# Patient Record
Sex: FEMALE | Race: ASIAN | NOT HISPANIC OR LATINO | URBAN - METROPOLITAN AREA
[De-identification: names, ages, dates, MRNs, and addresses within clinical notes are randomized per-mention and may not be internally consistent; named-entity substitution may affect disease eponyms.]

---

## 2017-09-26 ENCOUNTER — OUTPATIENT (OUTPATIENT)
Dept: OUTPATIENT SERVICES | Facility: HOSPITAL | Age: 82
LOS: 1 days | End: 2017-09-26
Payer: MEDICARE

## 2017-09-26 DIAGNOSIS — I77.0 ARTERIOVENOUS FISTULA, ACQUIRED: Chronic | ICD-10-CM

## 2017-09-26 DIAGNOSIS — I10 ESSENTIAL (PRIMARY) HYPERTENSION: ICD-10-CM

## 2017-09-26 DIAGNOSIS — I42.9 CARDIOMYOPATHY, UNSPECIFIED: ICD-10-CM

## 2017-09-26 PROCEDURE — 93306 TTE W/DOPPLER COMPLETE: CPT

## 2017-09-26 PROCEDURE — 93306 TTE W/DOPPLER COMPLETE: CPT | Mod: 26

## 2019-01-08 ENCOUNTER — INPATIENT (INPATIENT)
Facility: HOSPITAL | Age: 84
LOS: 1 days | Discharge: HOME | DRG: 682 | End: 2019-01-10
Attending: INTERNAL MEDICINE | Admitting: INTERNAL MEDICINE
Payer: MEDICARE

## 2019-01-08 VITALS
TEMPERATURE: 97 F | WEIGHT: 177.03 LBS | OXYGEN SATURATION: 98 % | SYSTOLIC BLOOD PRESSURE: 149 MMHG | HEIGHT: 59 IN | RESPIRATION RATE: 24 BRPM | DIASTOLIC BLOOD PRESSURE: 68 MMHG | HEART RATE: 47 BPM

## 2019-01-08 DIAGNOSIS — E87.5 HYPERKALEMIA: ICD-10-CM

## 2019-01-08 DIAGNOSIS — I10 ESSENTIAL (PRIMARY) HYPERTENSION: ICD-10-CM

## 2019-01-08 DIAGNOSIS — N18.6 END STAGE RENAL DISEASE: ICD-10-CM

## 2019-01-08 DIAGNOSIS — E78.5 HYPERLIPIDEMIA, UNSPECIFIED: ICD-10-CM

## 2019-01-08 DIAGNOSIS — N25.0 RENAL OSTEODYSTROPHY: ICD-10-CM

## 2019-01-08 DIAGNOSIS — Z29.9 ENCOUNTER FOR PROPHYLACTIC MEASURES, UNSPECIFIED: ICD-10-CM

## 2019-01-08 DIAGNOSIS — I77.0 ARTERIOVENOUS FISTULA, ACQUIRED: Chronic | ICD-10-CM

## 2019-01-08 DIAGNOSIS — R06.02 SHORTNESS OF BREATH: ICD-10-CM

## 2019-01-08 DIAGNOSIS — D64.9 ANEMIA, UNSPECIFIED: ICD-10-CM

## 2019-01-08 DIAGNOSIS — Z91.89 OTHER SPECIFIED PERSONAL RISK FACTORS, NOT ELSEWHERE CLASSIFIED: ICD-10-CM

## 2019-01-08 DIAGNOSIS — R63.8 OTHER SYMPTOMS AND SIGNS CONCERNING FOOD AND FLUID INTAKE: ICD-10-CM

## 2019-01-08 DIAGNOSIS — R00.1 BRADYCARDIA, UNSPECIFIED: ICD-10-CM

## 2019-01-08 DIAGNOSIS — R74.8 ABNORMAL LEVELS OF OTHER SERUM ENZYMES: ICD-10-CM

## 2019-01-08 LAB
ANION GAP SERPL CALC-SCNC: 23 MMOL/L — HIGH (ref 5–17)
BASOPHILS NFR BLD AUTO: 0.3 % — SIGNIFICANT CHANGE UP (ref 0–2)
BUN SERPL-MCNC: 92 MG/DL — HIGH (ref 7–23)
CALCIUM SERPL-MCNC: 9.8 MG/DL — SIGNIFICANT CHANGE UP (ref 8.4–10.5)
CHLORIDE SERPL-SCNC: 87 MMOL/L — LOW (ref 96–108)
CO2 SERPL-SCNC: 15 MMOL/L — LOW (ref 22–31)
CREAT SERPL-MCNC: 9.33 MG/DL — HIGH (ref 0.5–1.3)
EOSINOPHIL NFR BLD AUTO: 0.2 % — SIGNIFICANT CHANGE UP (ref 0–6)
GLUCOSE SERPL-MCNC: 153 MG/DL — HIGH (ref 70–99)
HBV SURFACE AG SER-ACNC: SIGNIFICANT CHANGE UP
HCT VFR BLD CALC: 34.3 % — LOW (ref 34.5–45)
HGB BLD-MCNC: 11.5 G/DL — SIGNIFICANT CHANGE UP (ref 11.5–15.5)
LYMPHOCYTES # BLD AUTO: 14.9 % — SIGNIFICANT CHANGE UP (ref 13–44)
MCHC RBC-ENTMCNC: 32 PG — SIGNIFICANT CHANGE UP (ref 27–34)
MCHC RBC-ENTMCNC: 33.5 G/DL — SIGNIFICANT CHANGE UP (ref 32–36)
MCV RBC AUTO: 95.5 FL — SIGNIFICANT CHANGE UP (ref 80–100)
MONOCYTES NFR BLD AUTO: 6 % — SIGNIFICANT CHANGE UP (ref 2–14)
NEUTROPHILS NFR BLD AUTO: 78.6 % — HIGH (ref 43–77)
PLATELET # BLD AUTO: 253 K/UL — SIGNIFICANT CHANGE UP (ref 150–400)
POTASSIUM SERPL-MCNC: 7.5 MMOL/L — CRITICAL HIGH (ref 3.5–5.3)
POTASSIUM SERPL-SCNC: 7.5 MMOL/L — CRITICAL HIGH (ref 3.5–5.3)
RAPID RVP RESULT: SIGNIFICANT CHANGE UP
RBC # BLD: 3.59 M/UL — LOW (ref 3.8–5.2)
RBC # FLD: 12.8 % — SIGNIFICANT CHANGE UP (ref 10.3–16.9)
SODIUM SERPL-SCNC: 125 MMOL/L — LOW (ref 135–145)
TROPONIN T SERPL-MCNC: 0.14 NG/ML — CRITICAL HIGH (ref 0–0.01)
WBC # BLD: 11.6 K/UL — HIGH (ref 3.8–10.5)
WBC # FLD AUTO: 11.6 K/UL — HIGH (ref 3.8–10.5)

## 2019-01-08 PROCEDURE — 99291 CRITICAL CARE FIRST HOUR: CPT

## 2019-01-08 PROCEDURE — 99292 CRITICAL CARE ADDL 30 MIN: CPT

## 2019-01-08 PROCEDURE — 71045 X-RAY EXAM CHEST 1 VIEW: CPT | Mod: 26

## 2019-01-08 RX ORDER — LOSARTAN POTASSIUM 100 MG/1
25 TABLET, FILM COATED ORAL EVERY 12 HOURS
Qty: 0 | Refills: 0 | Status: DISCONTINUED | OUTPATIENT
Start: 2019-01-08 | End: 2019-01-08

## 2019-01-08 RX ORDER — LOSARTAN POTASSIUM 100 MG/1
25 TABLET, FILM COATED ORAL ONCE
Qty: 0 | Refills: 0 | Status: DISCONTINUED | OUTPATIENT
Start: 2019-01-08 | End: 2019-01-08

## 2019-01-08 RX ORDER — ATROPINE SULFATE 0.1 MG/ML
1 SYRINGE (ML) INJECTION ONCE
Qty: 0 | Refills: 0 | Status: COMPLETED | OUTPATIENT
Start: 2019-01-08 | End: 2019-01-08

## 2019-01-08 RX ORDER — ALBUTEROL 90 UG/1
2.5 AEROSOL, METERED ORAL
Qty: 0 | Refills: 0 | Status: COMPLETED | OUTPATIENT
Start: 2019-01-08 | End: 2019-01-08

## 2019-01-08 RX ORDER — IPRATROPIUM/ALBUTEROL SULFATE 18-103MCG
3 AEROSOL WITH ADAPTER (GRAM) INHALATION ONCE
Qty: 0 | Refills: 0 | Status: COMPLETED | OUTPATIENT
Start: 2019-01-08 | End: 2019-01-08

## 2019-01-08 RX ORDER — INSULIN HUMAN 100 [IU]/ML
10 INJECTION, SOLUTION SUBCUTANEOUS ONCE
Qty: 0 | Refills: 0 | Status: COMPLETED | OUTPATIENT
Start: 2019-01-08 | End: 2019-01-08

## 2019-01-08 RX ORDER — DOXERCALCIFEROL 2.5 UG/1
2 CAPSULE ORAL ONCE
Qty: 0 | Refills: 0 | Status: COMPLETED | OUTPATIENT
Start: 2019-01-08 | End: 2019-01-08

## 2019-01-08 RX ORDER — CALCIUM GLUCONATE 100 MG/ML
1 VIAL (ML) INTRAVENOUS ONCE
Qty: 0 | Refills: 0 | Status: COMPLETED | OUTPATIENT
Start: 2019-01-08 | End: 2019-01-08

## 2019-01-08 RX ORDER — FOLIC ACID 0.8 MG
1 TABLET ORAL DAILY
Qty: 0 | Refills: 0 | Status: DISCONTINUED | OUTPATIENT
Start: 2019-01-08 | End: 2019-01-10

## 2019-01-08 RX ORDER — LOSARTAN POTASSIUM 100 MG/1
25 TABLET, FILM COATED ORAL
Qty: 0 | Refills: 0 | Status: DISCONTINUED | OUTPATIENT
Start: 2019-01-08 | End: 2019-01-09

## 2019-01-08 RX ORDER — CHLORHEXIDINE GLUCONATE 213 G/1000ML
1 SOLUTION TOPICAL DAILY
Qty: 0 | Refills: 0 | Status: DISCONTINUED | OUTPATIENT
Start: 2019-01-08 | End: 2019-01-10

## 2019-01-08 RX ORDER — SODIUM POLYSTYRENE SULFONATE 4.1 MEQ/G
30 POWDER, FOR SUSPENSION ORAL ONCE
Qty: 0 | Refills: 0 | Status: COMPLETED | OUTPATIENT
Start: 2019-01-08 | End: 2019-01-08

## 2019-01-08 RX ORDER — DEXTROSE 50 % IN WATER 50 %
50 SYRINGE (ML) INTRAVENOUS ONCE
Qty: 0 | Refills: 0 | Status: COMPLETED | OUTPATIENT
Start: 2019-01-08 | End: 2019-01-08

## 2019-01-08 RX ORDER — IPRATROPIUM/ALBUTEROL SULFATE 18-103MCG
3 AEROSOL WITH ADAPTER (GRAM) INHALATION EVERY 6 HOURS
Qty: 0 | Refills: 0 | Status: DISCONTINUED | OUTPATIENT
Start: 2019-01-08 | End: 2019-01-10

## 2019-01-08 RX ORDER — SIMVASTATIN 20 MG/1
20 TABLET, FILM COATED ORAL AT BEDTIME
Qty: 0 | Refills: 0 | Status: DISCONTINUED | OUTPATIENT
Start: 2019-01-08 | End: 2019-01-10

## 2019-01-08 RX ORDER — HEPARIN SODIUM 5000 [USP'U]/ML
5000 INJECTION INTRAVENOUS; SUBCUTANEOUS EVERY 8 HOURS
Qty: 0 | Refills: 0 | Status: DISCONTINUED | OUTPATIENT
Start: 2019-01-08 | End: 2019-01-09

## 2019-01-08 RX ORDER — CHOLECALCIFEROL (VITAMIN D3) 125 MCG
2000 CAPSULE ORAL DAILY
Qty: 0 | Refills: 0 | Status: DISCONTINUED | OUTPATIENT
Start: 2019-01-08 | End: 2019-01-10

## 2019-01-08 RX ORDER — SODIUM BICARBONATE 1 MEQ/ML
50 SYRINGE (ML) INTRAVENOUS ONCE
Qty: 0 | Refills: 0 | Status: COMPLETED | OUTPATIENT
Start: 2019-01-08 | End: 2019-01-08

## 2019-01-08 RX ORDER — SEVELAMER CARBONATE 2400 MG/1
1600 POWDER, FOR SUSPENSION ORAL
Qty: 0 | Refills: 0 | Status: DISCONTINUED | OUTPATIENT
Start: 2019-01-08 | End: 2019-01-10

## 2019-01-08 RX ADMIN — ALBUTEROL 2.5 MILLIGRAM(S): 90 AEROSOL, METERED ORAL at 13:07

## 2019-01-08 RX ADMIN — LOSARTAN POTASSIUM 25 MILLIGRAM(S): 100 TABLET, FILM COATED ORAL at 23:21

## 2019-01-08 RX ADMIN — Medication 1 GRAM(S): at 14:18

## 2019-01-08 RX ADMIN — Medication 2000 UNIT(S): at 22:08

## 2019-01-08 RX ADMIN — SIMVASTATIN 20 MILLIGRAM(S): 20 TABLET, FILM COATED ORAL at 22:08

## 2019-01-08 RX ADMIN — Medication 3 MILLILITER(S): at 23:21

## 2019-01-08 RX ADMIN — Medication 50 MILLILITER(S): at 14:58

## 2019-01-08 RX ADMIN — INSULIN HUMAN 10 UNIT(S): 100 INJECTION, SOLUTION SUBCUTANEOUS at 14:57

## 2019-01-08 RX ADMIN — CHLORHEXIDINE GLUCONATE 1 APPLICATION(S): 213 SOLUTION TOPICAL at 17:00

## 2019-01-08 RX ADMIN — Medication 1 MILLIGRAM(S): at 13:48

## 2019-01-08 RX ADMIN — DOXERCALCIFEROL 2 MICROGRAM(S): 2.5 CAPSULE ORAL at 18:41

## 2019-01-08 RX ADMIN — Medication 200 GRAM(S): at 13:48

## 2019-01-08 RX ADMIN — Medication 1 MILLIGRAM(S): at 19:55

## 2019-01-08 RX ADMIN — HEPARIN SODIUM 5000 UNIT(S): 5000 INJECTION INTRAVENOUS; SUBCUTANEOUS at 22:08

## 2019-01-08 RX ADMIN — ALBUTEROL 2.5 MILLIGRAM(S): 90 AEROSOL, METERED ORAL at 13:47

## 2019-01-08 RX ADMIN — ALBUTEROL 2.5 MILLIGRAM(S): 90 AEROSOL, METERED ORAL at 13:27

## 2019-01-08 RX ADMIN — SEVELAMER CARBONATE 800 MILLIGRAM(S): 2400 POWDER, FOR SUSPENSION ORAL at 19:46

## 2019-01-08 RX ADMIN — Medication 3 MILLILITER(S): at 13:48

## 2019-01-08 NOTE — CONSULT NOTE ADULT - PROBLEM SELECTOR RECOMMENDATION 3
- from the monitor in the Ed - lilia control   - we will do more UF today with HD  - restart the home medications

## 2019-01-08 NOTE — H&P ADULT - PROBLEM SELECTOR PLAN 3
Patient missed Patient missed HD session on Monday secondary to dizziness and weakness. Now with hyponatremia, hyperkalemia, hypochloremia, elevated anion gap.   - Given temporizing measure in ED with insulin and calcium gluconate for hyperkalemia  - Urgent hemodialysis today  - f/u Renal/HD recs  - c/w home renvela, calcitriol

## 2019-01-08 NOTE — ED PROVIDER NOTE - OBJECTIVE STATEMENT
83 year old female with history of HTN, HLD, CKD on dialysis M, W, F presents to ED with concern for shortness of breath and missed dialysis appointment yesterday because she was feeling "too weak to go."  + Associated cough.  She denies associated chest pain, fever, chills, abdominal pain, nausea, vomiting, changes to peripheral edema or any additional acute complaints or concerns at this time.  Denies any known cardiac history aside from HTN and HLD.  She does admit to remote history of asthma and presents w audible wheeze.  Additional history is limited 2/2 patient is a poor historian.

## 2019-01-08 NOTE — ED ADULT TRIAGE NOTE - CHIEF COMPLAINT QUOTE
Patient  c/o sob and generalized weakness since yesterday . ON dialysis MWF , she missed her dialysis yesterday due to feeling weak .

## 2019-01-08 NOTE — PROGRESS NOTE ADULT - SUBJECTIVE AND OBJECTIVE BOX
Patient was seen and evaluated on dialysis.   Patient is tolerating the procedure well.   HR: 50 (01-08-19 @ 16:00)  BP: 205/90 (01-08-19 @ 16:00) pusle 65, /67  Continue dialysis:   Dialyzer:  180      QB:   400     QD: 500  Goal UF 3.5 over 3.5 Hours     First 1 h 4 k, 1 h - 3 k and 1.5 hours - 2 k

## 2019-01-08 NOTE — H&P ADULT - NSHPLABSRESULTS_GEN_ALL_CORE
.  LABS:                         11.5   11.6  )-----------( 253      ( 08 Jan 2019 13:39 )             34.3     01-08    125<L>  |  87<L>  |  92<H>  ----------------------------<  153<H>  7.5<HH>   |  15<L>  |  9.33<H>    Ca    9.8      08 Jan 2019 13:39          CARDIAC MARKERS ( 08 Jan 2019 13:39 )  x     / 0.14 ng/mL / x     / x     / x                RADIOLOGY, EKG & ADDITIONAL TESTS: Reviewed. .  LABS:                         11.5   11.6  )-----------( 253      ( 08 Jan 2019 13:39 )             34.3     01-08    125<L>  |  87<L>  |  92<H>  ----------------------------<  153<H>  7.5<HH>   |  15<L>  |  9.33<H>    Ca    9.8      08 Jan 2019 13:39          CARDIAC MARKERS ( 08 Jan 2019 13:39 )  x     / 0.14 ng/mL / x     / x     / x                RADIOLOGY, EKG & ADDITIONAL TESTS: Reviewed.  < from: Echocardiogram (09.26.17 @ 12:25) >  .Left ventricular hypertrophy presentThe left ventricular wall motion is normal.The left ventricular ejection fraction is 60%  < end of copied text >

## 2019-01-08 NOTE — ED ADULT NURSE NOTE - OBJECTIVE STATEMENT
Patient presents to the ED with SOB, wheezing and bradycardia.  Patient and her sister report that she has been weak for 3 days and missed dialysis yesterday because of weakness.  denies any fevers.  patient today is SOB with audible wheezes.  HR irregular in the 30's-50's.  Patient upgraded to Dr. Coy.  Nebulizer treatment initiated.  Patient placed on pacer pads prophylactically with zoll monitor at bedside.  No edema noted to legs, AV fistula to left radius.

## 2019-01-08 NOTE — ED PROVIDER NOTE - CRITICAL CARE PROVIDED
direct patient care (not related to procedure)/documentation/interpretation of diagnostic studies/consultation with other physicians/consult w/ pt's family directly relating to pts condition/additional history taking

## 2019-01-08 NOTE — H&P ADULT - PROBLEM SELECTOR PLAN 5
Patient with known AOCKD. Hgb 11.5, wnl on admission.  - On mircera with dialysis Patient with known AOCKD. Hgb 11.5, wnl on admission.  - On mircera, venofer with dialysis

## 2019-01-08 NOTE — CONSULT NOTE ADULT - PROBLEM SELECTOR RECOMMENDATION 4
- calcium noted  - please obtain phosphate   - we will do Hectorol with HD   - obtain PTH   - suppsed to be on Renvela 800 mg three times a day

## 2019-01-08 NOTE — ED ADULT NURSE NOTE - CHPI ED NUR SYMPTOMS NEG
no loss of consciousness/no headache/no nausea/no vomiting/no pain/no chills/no dizziness/no fever/no back pain

## 2019-01-08 NOTE — CONSULT NOTE ADULT - PROBLEM SELECTOR RECOMMENDATION 9
- her schedule is MWF   - missed her session on Monday   - now with symptomatic bradycardia - from hyperkalemia   - EDW - 70 kg   - we will do HD today at bedside   - hyperkalmia - will get HD today   - hyponatremia - most likely from free fluid retention   - volume status on the wet side   - electrolytes - noted   - metabolic acidosis - we will do HD today   - in and outs   - daily weight   - renal diet

## 2019-01-08 NOTE — H&P ADULT - NSHPOUTPATIENTPROVIDERS_GEN_ALL_CORE
Dr. Aquiles Cervantes (Renal): [phone]  Dr. Analia Garcia (PMD) Dr. Aquiles Cervantes (Renal/PMD): [phone]

## 2019-01-08 NOTE — ED ADULT NURSE NOTE - NSIMPLEMENTINTERV_GEN_ALL_ED
Implemented All Universal Safety Interventions:  Eure to call system. Call bell, personal items and telephone within reach. Instruct patient to call for assistance. Room bathroom lighting operational. Non-slip footwear when patient is off stretcher. Physically safe environment: no spills, clutter or unnecessary equipment. Stretcher in lowest position, wheels locked, appropriate side rails in place.

## 2019-01-08 NOTE — H&P ADULT - PROBLEM SELECTOR PLAN 2
According to her PMD, patient with history of COPD but has not seen pulm, not on medications or home O2. Patient presented with dyspnea and audible wheezing. s/p duonebs and albuterol nebs in ED. CXR with increased pulmonary vascular congestion and small pleural effusion at R lung base.   - RVP sent, f/u results  - BiPAP, attempt to wean  - Duonebs q6 PRN According to her PMD, patient with history of COPD but has not seen pulm, not on medications or home O2. Patient presented with dyspnea and audible wheezing. s/p duonebs and albuterol nebs in ED. CXR with increased pulmonary vascular congestion and small pleural effusion at R lung base, enlarged heart borders.   - RVP sent, f/u results  - BiPAP, attempt to wean  - Duonebs q6 PRN

## 2019-01-08 NOTE — H&P ADULT - PROBLEM SELECTOR PLAN 8
F: None  E: On HD, other plan as above.  N: NPO while on BIPAP, will be on DASH/TLC, Renal diet thereafter.

## 2019-01-08 NOTE — ED PROVIDER NOTE - NOTES
Patient w bigeminy on EKG and periods of significant bradycardia.  Pacer pads are placed and cardiology fellow is contacted and will come to ED to evaluate patient.

## 2019-01-08 NOTE — CONSULT NOTE ADULT - SUBJECTIVE AND OBJECTIVE BOX
This is 83 year old female with PMH -ESRD - MWF, HTN, HLD. The paient presented into the ED because of shortness of breath and not feeling well for a couple of days. She missed her HD session on Monday - not feeling not able to gofor her session at her HD [address] [facility]. Into the ED found to have bradycardia - symptomatic - and atropine was given. From the labs found to have potassium of 7.5 meq not hemolyzed. The renal service is called for further recommendations. the patient seen in her bed, no BIPAP, feels slightly better as per sister, no fever, still short of breath, no chest pain. Her last HD was done on Friday in her HD unit on [address], getting 4 hours HD sessions, does not know how much is fluid removal and dry weight       PAST MEDICAL & SURGICAL HISTORY:  Gout  HLD (hyperlipidemia)  HTN (hypertension)  CKD (chronic kidney disease)  AV fistula: LUE      MEDICATIONS  (STANDING):    MEDICATIONS  (PRN):      Allergies    No Known Allergies    Intolerances        SOCIAL HISTORY:    FAMILY HISTORY:      T(C): , Max: 36.2 (01-08-19 @ 12:40)  T(F): , Max: 97.1 (01-08-19 @ 12:40)  HR: 55 (01-08-19 @ 15:01)  BP: 190/91 (01-08-19 @ 15:01)  BP(mean): --  RR: 23 (01-08-19 @ 15:01)  SpO2: 100% (01-08-19 @ 15:01)  Wt(kg): --    Height (cm): 149.86 (01-08 @ 12:40)  Weight (kg): 80.3 (01-08 @ 12:40)  BMI (kg/m2): 35.8 (01-08 @ 12:40)  BSA (m2): 1.75 (01-08 @ 12:40)      Physical exam:   Alert and oriented   On BIPAP   JVD   Normal air entry into the lungs, no wheezing, scatter crackles   RRR, normal s1/s2, no murmurs, rubs or gallops  Abdomen - soft, not tender, not distended   Extremities: 1+ edema  HAs an AV fistula on the right good thrill       LABS:                        11.5   11.6  )-----------( 253      ( 08 Jan 2019 13:39 )             34.3     01-08    125<L>  |  87<L>  |  92<H>  ----------------------------<  153<H>  7.5<HH>   |  15<L>  |  9.33<H>    Ca    9.8      08 Jan 2019 13:39                  RADIOLOGY & ADDITIONAL STUDIES:

## 2019-01-08 NOTE — H&P ADULT - ASSESSMENT
83F w PMH of ESRD on HD (MWF), nephrosclerosis, HTN, cardiomyopathy, gout, COPD, OA, who presented with dyspnea after missing dialysis and admitted to CCU for urgent dialysis for severe electrolyte abnormalities and bradycardia. 83F w PMH of ESRD on HD (MWF), nephrosclerosis, HTN, gout, COPD, OA, who presented with dyspnea after missing dialysis and admitted to CCU for urgent dialysis for severe electrolyte abnormalities and bradycardia.

## 2019-01-08 NOTE — H&P ADULT - NSHPPHYSICALEXAM_GEN_ALL_CORE
.  VITAL SIGNS:  T(C): 36.2 (01-08-19 @ 12:40), Max: 36.2 (01-08-19 @ 12:40)  T(F): 97.1 (01-08-19 @ 12:40), Max: 97.1 (01-08-19 @ 12:40)  HR: 55 (01-08-19 @ 15:01) (47 - 59)  BP: 190/91 (01-08-19 @ 15:01) (149/68 - 190/91)  BP(mean): --  RR: 23 (01-08-19 @ 15:01) (23 - 24)  SpO2: 100% (01-08-19 @ 15:01) (98% - 100%)  Wt(kg): --    PHYSICAL EXAM:    Constitutional: WDWN resting comfortably in bed; NAD  Head: NC/AT  Eyes: PERRL, EOMI, anicteric sclera  ENT: no nasal discharge; uvula midline, no oropharyngeal erythema or exudates; MMM  Neck: supple; no JVD or thyromegaly  Respiratory: CTA B/L; no W/R/R, no retractions  Cardiac: +S1/S2; RRR; no M/R/G; PMI non-displaced  Gastrointestinal: abdomen soft, NT/ND; no rebound or guarding; +BSx4  Genitourinary: normal external genitalia  Back: spine midline, no bony tenderness or step-offs; no CVAT B/L  Extremities: WWP, no clubbing or cyanosis; no peripheral edema  Musculoskeletal: NROM x4; no joint swelling, tenderness or erythema  Vascular: 2+ radial, femoral, DP/PT pulses B/L  Dermatologic: skin warm, dry and intact; no rashes, wounds, or scars  Lymphatic: no submandibular or cervical LAD  Neurologic: AAOx3; CNII-XII grossly intact; no focal deficits  Psychiatric: affect and characteristics of appearance, verbalizations, behaviors are appropriate .  VITAL SIGNS:  T(C): 36.2 (01-08-19 @ 12:40), Max: 36.2 (01-08-19 @ 12:40)  T(F): 97.1 (01-08-19 @ 12:40), Max: 97.1 (01-08-19 @ 12:40)  HR: 55 (01-08-19 @ 15:01) (47 - 59)  BP: 190/91 (01-08-19 @ 15:01) (149/68 - 190/91)  BP(mean): --  RR: 23 (01-08-19 @ 15:01) (23 - 24)  SpO2: 100% (01-08-19 @ 15:01) (98% - 100%)  Wt(kg): --    PHYSICAL EXAM:    Constitutional: Overweight woman resting comfortably in bed; on bIPAP  Head: NC/AT  Eyes: PERRL, EOMI, anicteric sclera  ENT: no nasal discharge; uvula midline, no oropharyngeal erythema or exudates; MMM  Neck: supple; no JVD or thyromegaly  Respiratory: Scattered, rare, end expiratory wheezes.   Cardiac: +S1/S2; bradycardic, regular rhythm, no M/R/G; PMI non-displaced  Gastrointestinal: abdomen soft, NT/ND; no rebound or guarding; +BSx4  Extremities: WWP, no clubbing or cyanosis; 2+ pitting edema to bilateral knees.   Vascular: 2+ radial, DP/PT pulses B/L  Dermatologic: LA AVF with overlying dressing  Neurologic: AAOx3; CNII-XII grossly intact. Appears confused, answering 'summer' when asked about season but able to answer other temporal questions correctly. Poor historian.  Psychiatric: affect and characteristics of appearance, verbalizations, behaviors are appropriate

## 2019-01-08 NOTE — H&P ADULT - ATTENDING COMMENTS
Pt is an 82 y/o woman c HTN, ESRD on HD who presents with weakness after missing HD on Mon. Noted with K 7.5    ECG: junctional bradycardia 40  Tele: possible afib overnt, pt in sinus in the morning    100.6, 70, 150/70, 98%    K 7.5 --> 4.8  Hgb 9.2  BUn 92 --> 43    ECG: nsr @ 70    - pt needed emergent dialysis  - hyperkalemia resolved and cardiac issues resolved  - pt with resp failure overnt with bipap use, now on nasal cannular  - resp failure secondary to volume overload  - assess for SW intervention for missing HD etc  - check U/A for source of low grade fever Pt is an 84 y/o woman c HTN, ESRD on HD who presents with weakness after missing HD on Mon. Noted with K 7.5    ECG: junctional bradycardia 40  Tele: afib overnt, pt in sinus in the morning    100.6, 70, 150/70, 98%    K 7.5 --> 4.8  Hgb 9.2  BUn 92 --> 43    ECG: nsr @ 70    - pt needed emergent dialysis  - hyperkalemia resolved and cardiac issues resolved  - pt with resp failure overnt with bipap use, now on nasal cannular  - resp failure secondary to volume overload  - assess for SW intervention for missing HD etc  - check U/A for source of low grade fever  - pt with fib with conversions pause with hi chads vasc, would begin anticoagulation in pt

## 2019-01-08 NOTE — H&P ADULT - NSHPSOCIALHISTORY_GEN_ALL_CORE
.  Tobacco use:   EtOH use:  Illicit drug use:    Living situation: .  Tobacco use: Denies  EtOH use: Denies  Illicit drug use: Denies    Living situation: Retired, former . Lives with sisters.

## 2019-01-08 NOTE — H&P ADULT - HISTORY OF PRESENT ILLNESS
Patient is an 83F w PMH of ESRD on HD (MWF), nephrosclerosis, HTN, cardiomyopathy, gout, COPD, OA, who presents to the ED with shortness of breath after missing dialysis. Patient started feeling dizzy and weak yesterday and missed her hemodialysis session as she felt she could not walk to the HD center. Today, she felt worsening shortness of breath, starting at 9AM, accompanied by a cough, subjective fever, and ongoing lightheadedness. She denies headache, chest pain, palpitations, nausea, vomiting. Her family members at bedside also state that she appeared slightly confused this AM. She denies feeling anything like this before.     She presented to the [facility] ED where her vitals on presentation were: T97.1, /68, HR 47, RR 24, 98% O2 on RA. On telemetry, her HR was as low as 30s-40s. On EKG, she was in a junctional bigeminy rhythm at around 60bpm. Initial labs were significant for significant electrolyte derangements: Na 125, K 7.5, Cl 87, CO2 15, BUN 92, Cr 9.33, and Anion Gap of 23. For her hyperkalemia, cardiology and dialysis were consulted. She was given 1mg atropine, calcium gluconate 1g, 10u of regular insulin, 25g D50, sodium bicarb 50 meQ, 3 albuterol nebs and 1 duoneb. She was started on BiPAP, pacer pads were placed, and she was admitted to CCU for urgent dialysis and further management of her arrhythmias Patient is an 83F w PMH of ESRD on HD (MWF), nephrosclerosis, HTN, gout, COPD, OA, who presents to the ED with shortness of breath after missing dialysis. Patient started feeling dizzy and weak yesterday and missed her hemodialysis session as she felt she could not walk to the HD center. Today, she felt worsening shortness of breath, starting at 9AM, accompanied by a cough, subjective fever, and ongoing lightheadedness. She denies headache, chest pain, palpitations, nausea, vomiting. Her family members at bedside also state that she appeared slightly confused this AM. She denies feeling anything like this before.     She presented to the [facility] ED where her vitals on presentation were: T97.1, /68, HR 47, RR 24, 98% O2 on RA. On telemetry, her HR was as low as 30s-40s. On EKG, she was in a junctional bigeminy rhythm at around 60bpm. Initial labs were significant for significant electrolyte derangements: Na 125, K 7.5, Cl 87, CO2 15, BUN 92, Cr 9.33, and Anion Gap of 23. For her hyperkalemia, cardiology and dialysis were consulted. She was given 1mg atropine, calcium gluconate 1g, 10u of regular insulin, 25g D50, sodium bicarb 50 meQ, 3 albuterol nebs and 1 duoneb. She was started on BiPAP, pacer pads were placed, and she was admitted to CCU for urgent dialysis and further management of her arrhythmias

## 2019-01-08 NOTE — ED PROVIDER NOTE - SKIN, MLM
Dialysis fistual to left forearm, good thrill.  Skin normal color for race, warm, dry and intact. No evidence of rash.

## 2019-01-08 NOTE — ED ADULT NURSE REASSESSMENT NOTE - NS ED NURSE REASSESS COMMENT FT1
Patient work of breathing slightly improved, to be put on bipap for further support.  Medicated with calcium gluconate and atropine as ordered, HR in the 60's at this time. will continue to closely monitor.

## 2019-01-08 NOTE — H&P ADULT - PROBLEM SELECTOR PLAN 4
Patient hypertensive to as high as 205/90 while on CCU. Likely due to volume overload from missing HD session  - urgent dialysis  - holding home coreg in setting of bradycardia  - c/w home 25mg BID losartan

## 2019-01-08 NOTE — H&P ADULT - PROBLEM SELECTOR PLAN 10
1) PCP Contacted on Admission: Dr. Helen REYES's office aware of admission  2) Date of Contact with PCP: 1/8/19  3) PCP Contacted at Discharge: (Y/N)  4) Summary of Handoff Given to PCP:   5) Post-Discharge Appointment Date and Location:

## 2019-01-08 NOTE — ED PROVIDER NOTE - CARE PLAN
Principal Discharge DX:	Shortness of breath  Secondary Diagnosis:	Hyperkalemia  Secondary Diagnosis:	Cardiac arrhythmia, unspecified cardiac arrhythmia type  Secondary Diagnosis:	Electrolyte abnormality

## 2019-01-08 NOTE — ED PROVIDER NOTE - MEDICAL DECISION MAKING DETAILS
Patient in ED w profound SOB, diffuse wheezing - audible at bedside.  Given multiple nebs.  Significant bradycardia.  PM pads applied on arrival and atropine at bedside.  Cardio fellow in ED to eval patient.  Patient is given single dose of atropine as well as Calcium 2/2 suspected hyperkalemia contributing to cardiac dysrhythmia.  I spoke w dialysis fellow who is aware patient will require dialysis and requests Dr. Olson be contacted as he has seen patient in the past.  On call for Dr. Olson was not yet called back and cardio requesting admission to CCU under Dr. Saldivar.  Cardio fellow requesting dialysis fellow be contacted again as CCU attending wants dialysis facilitated quickly.  2nd call is placed to dialysis fellow in inform him and will admit to CCU under Dr. Saldivar at this time.  Of note, patient appears improved in ED at time is diso w HR consistently 50-60 and respiratory status showing significant improvement.  Will admit at this time.

## 2019-01-08 NOTE — PATIENT PROFILE ADULT - NSPRONUTRITIONRISK_GEN_A_NUR
Sibling  Still living? Unknown  Family history of asthma, Age at diagnosis: Age Unknown No indicators present

## 2019-01-08 NOTE — H&P ADULT - PROBLEM SELECTOR PLAN 1
Patient with new junctional bradycardia to as low as 30s in setting of Patient with new junctional bradycardia to as low as 30s in setting of severe electrolyte derangements secondary to missed dialysis session. Now HR in 50s.   - s/p atropine 1mg  - Urgent hemodialysis  - Serial EKG  - Telemetry

## 2019-01-08 NOTE — ED PROVIDER NOTE - RESPIRATORY, MLM
+ Diffuse insp and exp wheezes to bilateral lung fields with decreased air movement throughout.  + Conversational dyspnea.

## 2019-01-08 NOTE — CONSULT NOTE ADULT - ASSESSMENT
This is 83 year old ERSD - now with synptomatic bradycardia from hyperkalemia - received - 1o units of insulin + d50, bicarb push, albuterol and calcium gluconate. we will do HD today at bedside

## 2019-01-08 NOTE — ED PROVIDER NOTE - CONSTITUTIONAL, MLM
normal... Well nourished, awake, alert, oriented to person, place, time/situation and in mild respiratory distress.

## 2019-01-09 ENCOUNTER — TRANSCRIPTION ENCOUNTER (OUTPATIENT)
Age: 84
End: 2019-01-09

## 2019-01-09 PROBLEM — Z00.00 ENCOUNTER FOR PREVENTIVE HEALTH EXAMINATION: Status: ACTIVE | Noted: 2019-01-09

## 2019-01-09 LAB
ALBUMIN SERPL ELPH-MCNC: 3.4 G/DL — SIGNIFICANT CHANGE UP (ref 3.3–5)
ALP SERPL-CCNC: 102 U/L — SIGNIFICANT CHANGE UP (ref 40–120)
ALT FLD-CCNC: 110 U/L — HIGH (ref 10–45)
ANION GAP SERPL CALC-SCNC: 18 MMOL/L — HIGH (ref 5–17)
ANION GAP SERPL CALC-SCNC: 19 MMOL/L — HIGH (ref 5–17)
APTT BLD: 30.8 SEC — SIGNIFICANT CHANGE UP (ref 27.5–36.3)
AST SERPL-CCNC: 161 U/L — HIGH (ref 10–40)
BASOPHILS NFR BLD AUTO: 0.5 % — SIGNIFICANT CHANGE UP (ref 0–2)
BILIRUB SERPL-MCNC: 0.5 MG/DL — SIGNIFICANT CHANGE UP (ref 0.2–1.2)
BLD GP AB SCN SERPL QL: NEGATIVE — SIGNIFICANT CHANGE UP
BUN SERPL-MCNC: 35 MG/DL — HIGH (ref 7–23)
BUN SERPL-MCNC: 43 MG/DL — HIGH (ref 7–23)
CALCIUM SERPL-MCNC: 8.8 MG/DL — SIGNIFICANT CHANGE UP (ref 8.4–10.5)
CALCIUM SERPL-MCNC: 9.1 MG/DL — SIGNIFICANT CHANGE UP (ref 8.4–10.5)
CHLORIDE SERPL-SCNC: 90 MMOL/L — LOW (ref 96–108)
CHLORIDE SERPL-SCNC: 90 MMOL/L — LOW (ref 96–108)
CO2 SERPL-SCNC: 23 MMOL/L — SIGNIFICANT CHANGE UP (ref 22–31)
CO2 SERPL-SCNC: 24 MMOL/L — SIGNIFICANT CHANGE UP (ref 22–31)
CREAT SERPL-MCNC: 5.04 MG/DL — HIGH (ref 0.5–1.3)
CREAT SERPL-MCNC: 5.8 MG/DL — HIGH (ref 0.5–1.3)
EOSINOPHIL NFR BLD AUTO: 1.8 % — SIGNIFICANT CHANGE UP (ref 0–6)
FERRITIN SERPL-MCNC: 5070 NG/ML — HIGH (ref 15–150)
GLUCOSE SERPL-MCNC: 125 MG/DL — HIGH (ref 70–99)
GLUCOSE SERPL-MCNC: 98 MG/DL — SIGNIFICANT CHANGE UP (ref 70–99)
HCT VFR BLD CALC: 28.5 % — LOW (ref 34.5–45)
HCT VFR BLD CALC: 29.4 % — LOW (ref 34.5–45)
HGB BLD-MCNC: 9.2 G/DL — LOW (ref 11.5–15.5)
HGB BLD-MCNC: 9.7 G/DL — LOW (ref 11.5–15.5)
INR BLD: 1.2 — HIGH (ref 0.88–1.16)
IRON SATN MFR SERPL: 28 % — SIGNIFICANT CHANGE UP (ref 14–50)
IRON SATN MFR SERPL: 55 UG/DL — SIGNIFICANT CHANGE UP (ref 30–160)
LYMPHOCYTES # BLD AUTO: 17.5 % — SIGNIFICANT CHANGE UP (ref 13–44)
MAGNESIUM SERPL-MCNC: 2.2 MG/DL — SIGNIFICANT CHANGE UP (ref 1.6–2.6)
MAGNESIUM SERPL-MCNC: 2.3 MG/DL — SIGNIFICANT CHANGE UP (ref 1.6–2.6)
MCHC RBC-ENTMCNC: 30.8 PG — SIGNIFICANT CHANGE UP (ref 27–34)
MCHC RBC-ENTMCNC: 31.6 PG — SIGNIFICANT CHANGE UP (ref 27–34)
MCHC RBC-ENTMCNC: 32.3 G/DL — SIGNIFICANT CHANGE UP (ref 32–36)
MCHC RBC-ENTMCNC: 33 G/DL — SIGNIFICANT CHANGE UP (ref 32–36)
MCV RBC AUTO: 95.3 FL — SIGNIFICANT CHANGE UP (ref 80–100)
MCV RBC AUTO: 95.8 FL — SIGNIFICANT CHANGE UP (ref 80–100)
MONOCYTES NFR BLD AUTO: 14.3 % — HIGH (ref 2–14)
NEUTROPHILS NFR BLD AUTO: 65.9 % — SIGNIFICANT CHANGE UP (ref 43–77)
PHOSPHATE SERPL-MCNC: 5 MG/DL — HIGH (ref 2.5–4.5)
PLATELET # BLD AUTO: 204 K/UL — SIGNIFICANT CHANGE UP (ref 150–400)
PLATELET # BLD AUTO: 212 K/UL — SIGNIFICANT CHANGE UP (ref 150–400)
POTASSIUM SERPL-MCNC: 4.5 MMOL/L — SIGNIFICANT CHANGE UP (ref 3.5–5.3)
POTASSIUM SERPL-MCNC: 4.8 MMOL/L — SIGNIFICANT CHANGE UP (ref 3.5–5.3)
POTASSIUM SERPL-SCNC: 4.5 MMOL/L — SIGNIFICANT CHANGE UP (ref 3.5–5.3)
POTASSIUM SERPL-SCNC: 4.8 MMOL/L — SIGNIFICANT CHANGE UP (ref 3.5–5.3)
PROT SERPL-MCNC: 6.7 G/DL — SIGNIFICANT CHANGE UP (ref 6–8.3)
PROTHROM AB SERPL-ACNC: 13.6 SEC — HIGH (ref 10–12.9)
RBC # BLD: 2.99 M/UL — LOW (ref 3.8–5.2)
RBC # BLD: 3.07 M/UL — LOW (ref 3.8–5.2)
RBC # FLD: 12.1 % — SIGNIFICANT CHANGE UP (ref 10.3–16.9)
RBC # FLD: 13 % — SIGNIFICANT CHANGE UP (ref 10.3–16.9)
RH IG SCN BLD-IMP: POSITIVE — SIGNIFICANT CHANGE UP
SODIUM SERPL-SCNC: 132 MMOL/L — LOW (ref 135–145)
SODIUM SERPL-SCNC: 132 MMOL/L — LOW (ref 135–145)
TIBC SERPL-MCNC: 199 UG/DL — LOW (ref 220–430)
UIBC SERPL-MCNC: 144 UG/DL — SIGNIFICANT CHANGE UP (ref 110–370)
WBC # BLD: 10.4 K/UL — SIGNIFICANT CHANGE UP (ref 3.8–10.5)
WBC # BLD: 11.8 K/UL — HIGH (ref 3.8–10.5)
WBC # FLD AUTO: 10.4 K/UL — SIGNIFICANT CHANGE UP (ref 3.8–10.5)
WBC # FLD AUTO: 11.8 K/UL — HIGH (ref 3.8–10.5)

## 2019-01-09 PROCEDURE — 71045 X-RAY EXAM CHEST 1 VIEW: CPT | Mod: 26

## 2019-01-09 PROCEDURE — 99291 CRITICAL CARE FIRST HOUR: CPT

## 2019-01-09 PROCEDURE — 93010 ELECTROCARDIOGRAM REPORT: CPT | Mod: 76

## 2019-01-09 RX ORDER — APIXABAN 2.5 MG/1
2.5 TABLET, FILM COATED ORAL EVERY 12 HOURS
Qty: 0 | Refills: 0 | Status: DISCONTINUED | OUTPATIENT
Start: 2019-01-09 | End: 2019-01-10

## 2019-01-09 RX ORDER — LOSARTAN POTASSIUM 100 MG/1
25 TABLET, FILM COATED ORAL ONCE
Qty: 0 | Refills: 0 | Status: COMPLETED | OUTPATIENT
Start: 2019-01-09 | End: 2019-01-09

## 2019-01-09 RX ORDER — APIXABAN 2.5 MG/1
1 TABLET, FILM COATED ORAL
Qty: 60 | Refills: 0
Start: 2019-01-09

## 2019-01-09 RX ORDER — IPRATROPIUM/ALBUTEROL SULFATE 18-103MCG
3 AEROSOL WITH ADAPTER (GRAM) INHALATION EVERY 6 HOURS
Qty: 0 | Refills: 0 | Status: DISCONTINUED | OUTPATIENT
Start: 2019-01-09 | End: 2019-01-09

## 2019-01-09 RX ORDER — LOSARTAN POTASSIUM 100 MG/1
50 TABLET, FILM COATED ORAL EVERY 12 HOURS
Qty: 0 | Refills: 0 | Status: DISCONTINUED | OUTPATIENT
Start: 2019-01-10 | End: 2019-01-10

## 2019-01-09 RX ORDER — CARVEDILOL PHOSPHATE 80 MG/1
12.5 CAPSULE, EXTENDED RELEASE ORAL EVERY 12 HOURS
Qty: 0 | Refills: 0 | Status: DISCONTINUED | OUTPATIENT
Start: 2019-01-09 | End: 2019-01-10

## 2019-01-09 RX ADMIN — Medication 2000 UNIT(S): at 14:46

## 2019-01-09 RX ADMIN — SEVELAMER CARBONATE 1600 MILLIGRAM(S): 2400 POWDER, FOR SUSPENSION ORAL at 19:17

## 2019-01-09 RX ADMIN — SIMVASTATIN 20 MILLIGRAM(S): 20 TABLET, FILM COATED ORAL at 22:38

## 2019-01-09 RX ADMIN — CARVEDILOL PHOSPHATE 12.5 MILLIGRAM(S): 80 CAPSULE, EXTENDED RELEASE ORAL at 19:18

## 2019-01-09 RX ADMIN — Medication 1 MILLIGRAM(S): at 14:47

## 2019-01-09 RX ADMIN — SEVELAMER CARBONATE 1600 MILLIGRAM(S): 2400 POWDER, FOR SUSPENSION ORAL at 07:40

## 2019-01-09 RX ADMIN — HEPARIN SODIUM 5000 UNIT(S): 5000 INJECTION INTRAVENOUS; SUBCUTANEOUS at 06:00

## 2019-01-09 RX ADMIN — CHLORHEXIDINE GLUCONATE 1 APPLICATION(S): 213 SOLUTION TOPICAL at 14:57

## 2019-01-09 RX ADMIN — LOSARTAN POTASSIUM 25 MILLIGRAM(S): 100 TABLET, FILM COATED ORAL at 01:48

## 2019-01-09 RX ADMIN — LOSARTAN POTASSIUM 25 MILLIGRAM(S): 100 TABLET, FILM COATED ORAL at 19:16

## 2019-01-09 RX ADMIN — SEVELAMER CARBONATE 1600 MILLIGRAM(S): 2400 POWDER, FOR SUSPENSION ORAL at 14:46

## 2019-01-09 RX ADMIN — APIXABAN 2.5 MILLIGRAM(S): 2.5 TABLET, FILM COATED ORAL at 19:18

## 2019-01-09 RX ADMIN — LOSARTAN POTASSIUM 25 MILLIGRAM(S): 100 TABLET, FILM COATED ORAL at 05:39

## 2019-01-09 NOTE — DISCHARGE NOTE ADULT - PLAN OF CARE
Resolution of symptoms You were admitted to the hospital with shortness of breath. This was likely due to there being too much fluid in your body after you missed hemodialysis. Your shortness of breath resolved after you received hemodialysis. Please continue to attend hemodialysis regularly. Resolution of problem You were admitted to the hospital with hyperkalemia, or high levels of potassium in your blood. This was likely due to your missed hemodialysis, as dialysis helps to regulate the levels of potassium. Your hyperkalemia resolved after you received hemodialysis. Please continue to attend hemodialysis regularly. Medical management Anemia is a low number of red blood cells or a low amount of hemoglobin in your red blood cells. Hemoglobin is a protein that helps carry oxygen throughout your body. Red blood cells use iron to create hemoglobin. Anemia may develop if your body does not have enough iron. It may also develop if your body does not make enough red blood cells or they die faster than your body can make them. You have kidney failure, which requires dialysis to perform the function of your kidneys. Please continue to undergo dialysis according to your dialysis schedule, and follow up with your nephrologist to maintain your electrolyte balance. If you notice any palpitations, shortness of breath, or weakness, please return to the emergency department. Follow a low fat diet. Continue taking your cholesterol medications as prescribed. Follow up with your Primary Care Doctor to continue having your cholesterol levels checked on a continual basis. You have a history of high blood pressure. Your home medications were continued during your hospital stay. Continue to take your medications as directed and follow up with your primary care doctor in 10-14 days. Diagnosis and medical management You have an abnormal heart rhythm called atrial fibrillation. With this condition, your heart’s two upper chambers quiver rather than squeeze the blood out in a normal pattern. This leads to an irregular and sometimes rapid heartbeat. Some people will develop associated symptoms such as a flip-flopping heartbeat, chest pain, lightheadedness, or shortness of breath. Other people may have no symptoms at all. Atrial fibrillation is serious because it affects the heart’s ability to fill with blood as it should. Blood clots may form. This increases the risk for stroke. This complication can be prevented if you use blood thinning medications. Please take your eliquis as prescribed - one .5 milligram tablet every 12 hours    Limit your intake of coffee, tea, cola, and other beverages with caffeine. Talk with your doctor about whether you should eliminate caffeine. Avoid over-the-counter medicines that have caffeine in them. Never take stimulants such as amphetamines or cocaine. These drugs can speed up your heart rate and trigger atrial fibrillation.    Call your healthcare provider right away if you have any of the following:  Weakness  Dizziness  Fainting  Fatigue  Shortness of breath  Chest pain with increased activity  A change in the usual regularity of your heartbeat, or an unusually fast heartbeat

## 2019-01-09 NOTE — PROGRESS NOTE ADULT - PROBLEM SELECTOR PLAN 2
According to her PMD, patient with history of COPD but has not seen pulm, not on medications or home O2. Patient presented with dyspnea and audible wheezing. s/p duonebs and albuterol nebs in ED. CXR with increased pulmonary vascular congestion and small pleural effusion at R lung base, enlarged heart borders.   - RVP sent, f/u results  - BiPAP, attempt to wean  - Duonebs q6 PRN According to her PMD, patient with history of COPD but has not seen pulm, not on medications or home O2. Patient presented with dyspnea and audible wheezing. s/p duonebs and albuterol nebs in ED. CXR with increased pulmonary vascular congestion and small pleural effusion at R lung base, enlarged heart borders.   - RVP negative  - BiPAP o/n  - Duonebs q6 PRN

## 2019-01-09 NOTE — DISCHARGE NOTE ADULT - CARE PROVIDER_API CALL
No Aquiles Cervantes), Internal Medicine; Nephrology  [address] [place] [address]  Phone: [phone]  Fax: [phone] Aquiles Cervantes), Internal Medicine; Nephrology  [address] [address]  Phone: [phone]  Fax: [phone]    Christophe Matos), Internal Medicine  [address] [id number]  Phone: [phone]  Fax: [phone]

## 2019-01-09 NOTE — DISCHARGE NOTE ADULT - SECONDARY DIAGNOSIS.
Acute hyperkalemia Anemia due to chronic kidney disease, on chronic dialysis ESRD (end stage renal disease) on dialysis Hyperlipidemia, unspecified hyperlipidemia type Hypertension secondary to other renal disorders Paroxysmal atrial fibrillation

## 2019-01-09 NOTE — PROGRESS NOTE ADULT - SUBJECTIVE AND OBJECTIVE BOX
OVERNIGHT EVENTS: pt completed HD @ 7:30pm with 3.4L removed and pt subsequently went into Afib (CHADS-VASc 4 and HAS-BLED 3) with rates in the 110-130s; asymptomatic; BP elevated with SBP ~170, Losartan 25 mg given. However, went back to NSR. Labs at midnight with Hb 9.7 and K normalzied to 4.5    SUBJECTIVE:    Vital Signs Last 12 Hrs  T(F): 99.5 (01-09-19 @ 06:29), Max: 100.7 (01-09-19 @ 01:29)  HR: 66 (01-09-19 @ 06:00) (64 - 126)  BP: 164/52 (01-09-19 @ 06:00) (122/77 - 187/70)  BP(mean): 66 (01-09-19 @ 06:00) (66 - 115)  RR: 23 (01-09-19 @ 06:00) (18 - 33)  SpO2: 99% (01-09-19 @ 06:00) (90% - 100%)  I&O's Summary    08 Jan 2019 07:01  -  09 Jan 2019 07:00  --------------------------------------------------------  IN: 250 mL / OUT: 3400 mL / NET: -3150 mL        PHYSICAL EXAM:  Constitutional: NAD, comfortable in bed.  HEENT: NC/AT, PERRLA, EOMI, no conjunctival pallor or scleral icterus, MMM  Neck: Supple, no JVD  Respiratory: Normal rate, rhythm, depth, effort. CTAB. No w/r/r.   Cardiovascular: RRR, normal S1 and S2, no m/r/g.   Gastrointestinal: +BS, soft NTND, no guarding or rebound tenderness, no palpable masses   Extremities: wwp; no cyanosis, clubbing or edema.   Vascular: Pulses equal and strong throughout.   Neurological: AAOx3, no CN deficits, strength and sensation intact throughout.   Skin: No gross skin abnormalities or rashes        LABS:                        9.7    11.8  )-----------( 204      ( 09 Jan 2019 00:23 )             29.4     01-09    132<L>  |  90<L>  |  35<H>  ----------------------------<  125<H>  4.5   |  24  |  5.04<H>    Ca    9.1      09 Jan 2019 00:23  Phos  3.9     01-09  Mg     2.2     01-09      PT/INR - ( 09 Jan 2019 00:23 )   PT: 13.6 sec;   INR: 1.20          PTT - ( 09 Jan 2019 00:23 )  PTT:30.8 sec      RADIOLOGY & ADDITIONAL TESTS:    MEDICATIONS  (STANDING):  chlorhexidine 2% Cloths 1 Application(s) Topical daily  cholecalciferol 2000 Unit(s) Oral daily  folic acid 1 milliGRAM(s) Oral daily  heparin  Injectable 5000 Unit(s) SubCutaneous every 8 hours  losartan 25 milliGRAM(s) Oral two times a day  sevelamer hydrochloride 1600 milliGRAM(s) Oral three times a day with meals  simvastatin 20 milliGRAM(s) Oral at bedtime    MEDICATIONS  (PRN):  ALBUTerol/ipratropium for Nebulization 3 milliLiter(s) Nebulizer every 6 hours PRN Shortness of Breath and/or Wheezing OVERNIGHT EVENTS: pt completed HD @ 7:30pm with 3.4L removed and pt subsequently went into Afib (CHADS-VASc 4 and HAS-BLED 3) with rates in the 110-130s; asymptomatic; BP elevated with SBP ~170, Losartan 25 mg given. However, went back to NSR. Labs at midnight with Hb 9.7 and K normalzied to 4.5    SUBJECTIVE: Pt seen and examined at bedside. Pt with no acute complaints. Denies headache, dizziness, chest pain, SOB, vision/hearing changes, abdominal pain, bowel/bladder changes, numbness, weakness.     Vital Signs Last 12 Hrs  T(F): 99.5 (01-09-19 @ 06:29), Max: 100.7 (01-09-19 @ 01:29)  HR: 66 (01-09-19 @ 06:00) (64 - 126)  BP: 164/52 (01-09-19 @ 06:00) (122/77 - 187/70)  BP(mean): 66 (01-09-19 @ 06:00) (66 - 115)  RR: 23 (01-09-19 @ 06:00) (18 - 33)  SpO2: 99% (01-09-19 @ 06:00) (90% - 100%)  I&O's Summary    08 Jan 2019 07:01  -  09 Jan 2019 07:00  --------------------------------------------------------  IN: 250 mL / OUT: 3400 mL / NET: -3150 mL        PHYSICAL EXAM:  Constitutional: Overweight woman resting comfortably in bed; nasal cannula in place  Head: NC/AT  Eyes: PERRL, EOMI, anicteric sclera  ENT: no nasal discharge; uvula midline, no oropharyngeal erythema or exudates; MMM  Neck: supple; no JVD or thyromegaly  Respiratory: rales bilaterally  Cardiac: +S1/S2; bradycardic, regular rhythm, no M/R/G; PMI non-displaced  Gastrointestinal: abdomen soft, NT/ND; no rebound or guarding; +BSx4  Extremities: WWP, no clubbing or cyanosis; 2+ edema to calves  Vascular: 2+ radial, DP/PT pulses B/L  Dermatologic: LA AVF with overlying dressing  Neurologic: AAOx2 (to name and place, not date); CNII-XII grossly intact  Psychiatric: affect and characteristics of appearance, verbalizations, behaviors are appropriate        LABS:                        9.7    11.8  )-----------( 204      ( 09 Jan 2019 00:23 )             29.4     01-09    132<L>  |  90<L>  |  35<H>  ----------------------------<  125<H>  4.5   |  24  |  5.04<H>    Ca    9.1      09 Jan 2019 00:23  Phos  3.9     01-09  Mg     2.2     01-09      PT/INR - ( 09 Jan 2019 00:23 )   PT: 13.6 sec;   INR: 1.20          PTT - ( 09 Jan 2019 00:23 )  PTT:30.8 sec      RADIOLOGY & ADDITIONAL TESTS:    MEDICATIONS  (STANDING):  chlorhexidine 2% Cloths 1 Application(s) Topical daily  cholecalciferol 2000 Unit(s) Oral daily  folic acid 1 milliGRAM(s) Oral daily  heparin  Injectable 5000 Unit(s) SubCutaneous every 8 hours  losartan 25 milliGRAM(s) Oral two times a day  sevelamer hydrochloride 1600 milliGRAM(s) Oral three times a day with meals  simvastatin 20 milliGRAM(s) Oral at bedtime    MEDICATIONS  (PRN):  ALBUTerol/ipratropium for Nebulization 3 milliLiter(s) Nebulizer every 6 hours PRN Shortness of Breath and/or Wheezing

## 2019-01-09 NOTE — PROGRESS NOTE ADULT - PROBLEM SELECTOR PROBLEM 6
Moving Around Current Status (): At least 60 percent but less than 80 percent impaired, limited or restricted  Mobility: Walking and Moving Around Goal Status (): At least 40 percent but less than 60 percent impaired, limited or restricted    G-Code noted on 10/11/2018:   PT G-Codes  Functional Assessment Tool Used: Activities-Specific Balance Confidence Scale  Score: 38% average confidence; 62% impairment  Functional Limitation: Mobility: Walking and moving around  Mobility: Walking and Moving Around Current Status (): At least 60 percent but less than 80 percent impaired, limited or restricted  Mobility: Walking and Moving Around Goal Status (): At least 40 percent but less than 60 percent impaired, limited or restricted      G-Code noted on 11/8/2018:   PT G-Codes  Functional Assessment Tool Used: Activities-Specific Balance Confidence Scale  Score: 41% average confidence; 59% impairment  Functional Limitation: Mobility: Walking and moving around  Mobility: Walking and Moving Around Current Status (): At least 40 percent but less than 60 percent impaired, limited or restricted  Mobility: Walking and Moving Around Goal Status (): At least 40 percent but less than 60 percent impaired, limited or restricted    Progress Note: [x]  Yes  []  No  Next due by: Visit 12/8/18      Subjective:  Pt reports that she is feeling good. Still gets frustrated with her left leg feeling weak at times. Objective:  Observation:  Pt able to step up onto 4\" box with LLE, which she reports that she has been unable to do for a long time. Test measurements:       Exercises:  Exercise/Equipment Resistance/Repetitions Other comments   NuStep 10 min at level 3 avg 97 steps/min, with fatigue, resting 3 times. Sit to stand 25 x         Bilat Knee ext - seated 40 x ea 1# ankle weights, stopping for rest care home through LLE.  Verbal cues for slow eccentric return to start position   Ankle DF - Troponin level elevated

## 2019-01-09 NOTE — PROGRESS NOTE ADULT - ASSESSMENT
83F w PMH of ESRD on HD (MWF), nephrosclerosis, HTN, gout, COPD, OA, who presented with dyspnea after missing dialysis and admitted to CCU for urgent dialysis for severe electrolyte abnormalities and bradycardia.

## 2019-01-09 NOTE — PROGRESS NOTE ADULT - SUBJECTIVE AND OBJECTIVE BOX
CC: SOB      INTERVAL HISTORY:  still with some SOB  lying in bed with no complaints of CP      ROS: No chest pain, no sob, no abd pain. No n/v/d    PAST MEDICAL & SURGICAL HISTORY:  Gout  HLD (hyperlipidemia)  HTN (hypertension)  CKD (chronic kidney disease)  AV fistula: LUE      PHYSICAL EXAM:  T(C): 37.1 (01-09-19 @ 09:17), Max: 38.2 (01-09-19 @ 01:29)  HR: 68 (01-09-19 @ 08:06)  BP: 184/60 (01-09-19 @ 08:00) (122/77 - 205/90)  RR: 33 (01-09-19 @ 08:06)  SpO2: 98% (01-09-19 @ 08:06)  Wt(kg): --  I&O's Summary    08 Jan 2019 07:01  -  09 Jan 2019 07:00  --------------------------------------------------------  IN: 250 mL / OUT: 3400 mL / NET: -3150 mL      Weight 80.3 (01-08 @ 12:40)  General: AAO x 3,  NAD.  HEENT: moist mucous membranes, no pallor/cyanosis.  Neck: no JVD visible.  Cardiac: S1, S2. RRR. No murmurs   Respratory:decreased BS   Abdomen: soft. nontender. nondistended  Skin: no rashes.  Extremities: trace LE edema b/l  Access: + bruit in LAF      DATA:                        9.2<L>  10.4  )-----------( 212      ( 09 Jan 2019 06:21 )             28.5<L>        132<L>  |  90<L>  |  43<H>  ----------------------------<  98     Ca:8.8   (09 Jan 2019 06:21)  4.8     |  23     |  5.80<H>      eGFR if Non : 6 <L>  eGFR if : 7 <L>    TPro  6.7    /  Alb  3.4    /  TBili  0.5    /  DBili  x      /  AST  161<H>  /  ALT  110<H>  /  AlkPhos  102    09 Jan 2019 06:21                    MEDICATIONS  (STANDING):  chlorhexidine 2% Cloths 1 Application(s) Topical daily  cholecalciferol 2000 Unit(s) Oral daily  folic acid 1 milliGRAM(s) Oral daily  heparin  Injectable 5000 Unit(s) SubCutaneous every 8 hours  losartan 25 milliGRAM(s) Oral two times a day  sevelamer hydrochloride 1600 milliGRAM(s) Oral three times a day with meals  simvastatin 20 milliGRAM(s) Oral at bedtime    MEDICATIONS  (PRN):  ALBUTerol/ipratropium for Nebulization 3 milliLiter(s) Nebulizer every 6 hours PRN Shortness of Breath and/or Wheezing

## 2019-01-09 NOTE — PROGRESS NOTE ADULT - PROBLEM SELECTOR PLAN 8
F: None  E: On HD, other plan as above.  N: NPO while on BIPAP, will be on DASH/TLC, Renal diet thereafter. F: None  E: On HD, other plan as above.  N: DASH/TLC, Renal diet thereafter.

## 2019-01-09 NOTE — DISCHARGE NOTE ADULT - PHYSICIAN SECTION COMPLETE
Physical Therapy Daily Treatment     Visit Count: 15  Plan of Care Dates: Progress Note:  11/22/17 Through: 12/15/17     Insurance Information: AUTHORIZATION NEEDED: NO, FOLLOW MEDICARE GUIDELINES   CAP AMOUNT: $1980.00  USED:PT/ST$ 1711.18              OT:$  0.00       Next Referring Provider Visit: 11/7/17     Referred by: To Bar MD  Medical Diagnosis (from order):  V43.65 (ICD-9-CM) - Z96.652 (ICD-10-CM) - History of total left knee replacement  Treatment Diagnosis: Knee Symptoms with Pain, Impaired Range of Motion, Impaired Motor Function/Muscle Performance and Impaired Gait/Locomotion Deficits  Insurance: 1. MEDICARE  2. [employer]     Date of onset/injury: 04/17/17 left total knee arthroplasty; 10/30/17 left knee arthroscopy and manipulation     Diagnosis Precautions: Weight Bearing As Tolerated Left Lower Extremity  Chart reviewed: Relevant co-morbidities, allergies, tests and medications: None      SUBJECTIVE   The patient reports that she is having good days and bad days.  Sometimes her knee feels really good and other days it really hurts.  She reports that she is still having difficulty with going down the stairs.    Current Pain: 4/10     Functional Change: Feels stiff with sitting (in a car is the hardest), difficulty with stairs    OBJECTIVE      Range of Motion (degrees)    Norm Left Left  End of session Left  End of session  AAROM Left   End of session  AAROM Left   End of session  AAROM   Date   Initial 11/24/17 11/30/17 12/5/17 12/7/17   Knee Flexion 135  78 109 105 105 105   Knee Extension 0-5 -10 -3 in prone NT -2 in prone but supine -9 Supine -10   standard testing positions unless otherwise noted; Key: ranges are reported in active range of motion unless noted as AA=active assistive or P=passive range of motion, * denotes pain . In prone feels lot of pulling in the hamstrings.  At end range knee flexion feels pain in back of knee, lateral knee, and some  stretching anteriorly.    Palpation: Soft tissue restrictions lateral quadriceps and ITB    Scar mobility:  Decreased at medial inferior portal    Treatment     Therapeutic Exercise:   Per the log below.  Verbal, visual, and/or manual cues were provided for positioning, sets/repetitions/hold times. All exercises were added to HEP as indicated on the exercise log with an \"x\".    HEP Exercise Sets/Reps Comments/Cues    x Seated knee AAROM flx  OP at end range    Foam roller AAROM flx     x 2nd stair lunge     x Alt standing lunge     x squat     x Standing HS curl     x Heel slide with assist of belt to increase flexion and extension  Therapist assisted extension with belt to assist knee joint.   x Long seated HS/ gastroc stretch with belt with foot on stool     x Prone hangs with DF/PF  With plunger technique to HS, gastroc   x Standing gastroc stretch off steps     x Prone C/R of HS 10 sec then stretch into extension  Followed by neuro-reeducation.   x Prone C/R of  Quad  5 sec  Then stretch into flexion      SAQ and SLR after stretching of /mobs to increase ext of knee.  Tactile cues to quad and patella to increase facilitation    x Seated knee flexion with resistance ( green)  After stretching   x Prone knee extension with resistance ( green)  After stretching    Upright bike  Seat at 3 - full revolutions     PROM left knee/manual stretching  end range flexion and hamstrings  Supine, prone, sidelying   x Walking fwd-back      Total gym  -single leg squats (eccentric lowering)  Level 5    Recumbent bike  Warm up no charge   x Bridging ( moving heel up toward buttocks)      Sagar hip flexor stretch and knee flexion stretch  Plunger to quad and patella    Prone hip ext  AAROM    Briding, hip ext  AAROM end range    AAROM knee flex/ext 5 min HR end range     Manual Therapy:  -  patella mobilizations in all directions  -  plunger to ITB and lateral /anterior knee  -  Soft tissue mobilization along the ITB, lateral  quadriceps and hamstrings to improve tissue mobility  -  Myofascial clearing along the proximal lateral knee  -  Functional mobilization tibia on femur and femur on tibia to improve mobility.     Current Home Program (not performed this date except as noted above):   Access Code: [email or web address] range of motion exercises to be performed 5 times per day      ASSESSMENT   The patient had a significant reduction in the soft tissue restrictions at her lateral quadriceps and ITB following treatment.  This is the area that the patient has most of her pain and should help with reducing this.  No significant range of motion increases over the last week.      Pain after treatment: 3/10.    Result of above outlined education: Verbalizes understanding and Demonstrates understanding      Goals:  To be obtained by end of this plan of care:  1. Patient independent with modified and progressed home exercise program. On going  2. Patient will decrease involved knee pain/symptoms to 3/10  to aid in walking for community ambulation. Not met but improving   3. Patient will increase involved knee active range of motion to 0-120° to aid in stair ambulation. Not met but improving  4. Patient will increase involved knee strength to 4/5 to aid in car transfers. Met  5. Lower Extremity Functional Scale: Patient will complete form to reflect an improved score from initial score of 32 to greater than or equal to 41 (0=extreme difficulty; 80=no difficulty) to indicate pt reported improvement in function/disability/impairment (minimal detectable change: 9 points). Not met     PLAN   Plan for next session:  Check strength and range of motion.      Frequency/Duration: 2x/week for 3 additional weeks.  Skilled training and instruction for the following interventions:  Manual Therapy (30670)  Neuromuscular Re-Education (97109)  Therapeutic Activity (55530)  Therapeutic Exercise (94772)  Electrical Stimulation (18073//74919)   Ultrasound/Phonophoresis  (86408)     The plan of care and goals were established with the patient who concurs.  Patient has been given attendance policy at time of initial evaluation      THERAPY DAILY BILLING   Primary Insurance:  MEDICARE  Secondary Insurance: [employer]    Evaluation Procedures:  No evaluation codes were used on this date of service    Timed Procedures:    KX modifier applied  Manual Therapy, 25 minutes  Therapeutic Exercise, 5 minutes    Untimed Procedures:  No untimed codes were used on this date of service    Total Treatment Time: 30 minutes        G-Code:  G-Code Score ABN form  insurance type does not require G-codes  Modifier based on outcome measure(s)/functional testing/clinical judgement as listed above      Electronically sent for physician signature      Yes

## 2019-01-09 NOTE — DISCHARGE NOTE ADULT - CARE PROVIDERS DIRECT ADDRESSES
,DirectAddress_Unknown ,DirectAddress_Unknown,[email or web address]

## 2019-01-09 NOTE — DISCHARGE NOTE ADULT - CARE PLAN
Principal Discharge DX:	Shortness of breath  Goal:	Resolution of symptoms  Assessment and plan of treatment:	You were admitted to the hospital with shortness of breath. This was likely due to there being too much fluid in your body after you missed hemodialysis. Your shortness of breath resolved after you received hemodialysis. Please continue to attend hemodialysis regularly.  Secondary Diagnosis:	Acute hyperkalemia  Goal:	Resolution of problem  Assessment and plan of treatment:	You were admitted to the hospital with hyperkalemia, or high levels of potassium in your blood. This was likely due to your missed hemodialysis, as dialysis helps to regulate the levels of potassium. Your hyperkalemia resolved after you received hemodialysis. Please continue to attend hemodialysis regularly.  Secondary Diagnosis:	Anemia due to chronic kidney disease, on chronic dialysis  Goal:	Medical management  Assessment and plan of treatment:	Anemia is a low number of red blood cells or a low amount of hemoglobin in your red blood cells. Hemoglobin is a protein that helps carry oxygen throughout your body. Red blood cells use iron to create hemoglobin. Anemia may develop if your body does not have enough iron. It may also develop if your body does not make enough red blood cells or they die faster than your body can make them.  Secondary Diagnosis:	ESRD (end stage renal disease) on dialysis  Goal:	Medical management  Assessment and plan of treatment:	You have kidney failure, which requires dialysis to perform the function of your kidneys. Please continue to undergo dialysis according to your dialysis schedule, and follow up with your nephrologist to maintain your electrolyte balance. If you notice any palpitations, shortness of breath, or weakness, please return to the emergency department.  Secondary Diagnosis:	Hyperlipidemia, unspecified hyperlipidemia type  Goal:	Medical management  Assessment and plan of treatment:	Follow a low fat diet. Continue taking your cholesterol medications as prescribed. Follow up with your Primary Care Doctor to continue having your cholesterol levels checked on a continual basis.  Secondary Diagnosis:	Hypertension secondary to other renal disorders  Goal:	Medical management  Assessment and plan of treatment:	You have a history of high blood pressure. Your home medications were continued during your hospital stay. Continue to take your medications as directed and follow up with your primary care doctor in 10-14 days. Principal Discharge DX:	Shortness of breath  Goal:	Resolution of symptoms  Assessment and plan of treatment:	You were admitted to the hospital with shortness of breath. This was likely due to there being too much fluid in your body after you missed hemodialysis. Your shortness of breath resolved after you received hemodialysis. Please continue to attend hemodialysis regularly.  Secondary Diagnosis:	Acute hyperkalemia  Goal:	Resolution of problem  Assessment and plan of treatment:	You were admitted to the hospital with hyperkalemia, or high levels of potassium in your blood. This was likely due to your missed hemodialysis, as dialysis helps to regulate the levels of potassium. Your hyperkalemia resolved after you received hemodialysis. Please continue to attend hemodialysis regularly.  Secondary Diagnosis:	Anemia due to chronic kidney disease, on chronic dialysis  Goal:	Medical management  Assessment and plan of treatment:	Anemia is a low number of red blood cells or a low amount of hemoglobin in your red blood cells. Hemoglobin is a protein that helps carry oxygen throughout your body. Red blood cells use iron to create hemoglobin. Anemia may develop if your body does not have enough iron. It may also develop if your body does not make enough red blood cells or they die faster than your body can make them.  Secondary Diagnosis:	ESRD (end stage renal disease) on dialysis  Goal:	Medical management  Assessment and plan of treatment:	You have kidney failure, which requires dialysis to perform the function of your kidneys. Please continue to undergo dialysis according to your dialysis schedule, and follow up with your nephrologist to maintain your electrolyte balance. If you notice any palpitations, shortness of breath, or weakness, please return to the emergency department.  Secondary Diagnosis:	Hyperlipidemia, unspecified hyperlipidemia type  Goal:	Medical management  Assessment and plan of treatment:	Follow a low fat diet. Continue taking your cholesterol medications as prescribed. Follow up with your Primary Care Doctor to continue having your cholesterol levels checked on a continual basis.  Secondary Diagnosis:	Hypertension secondary to other renal disorders  Goal:	Medical management  Assessment and plan of treatment:	You have a history of high blood pressure. Your home medications were continued during your hospital stay. Continue to take your medications as directed and follow up with your primary care doctor in 10-14 days.  Secondary Diagnosis:	Paroxysmal atrial fibrillation  Goal:	Diagnosis and medical management  Assessment and plan of treatment:	You have an abnormal heart rhythm called atrial fibrillation. With this condition, your heart’s two upper chambers quiver rather than squeeze the blood out in a normal pattern. This leads to an irregular and sometimes rapid heartbeat. Some people will develop associated symptoms such as a flip-flopping heartbeat, chest pain, lightheadedness, or shortness of breath. Other people may have no symptoms at all. Atrial fibrillation is serious because it affects the heart’s ability to fill with blood as it should. Blood clots may form. This increases the risk for stroke. This complication can be prevented if you use blood thinning medications. Please take your eliquis as prescribed - one .5 milligram tablet every 12 hours    Limit your intake of coffee, tea, cola, and other beverages with caffeine. Talk with your doctor about whether you should eliminate caffeine. Avoid over-the-counter medicines that have caffeine in them. Never take stimulants such as amphetamines or cocaine. These drugs can speed up your heart rate and trigger atrial fibrillation.    Call your healthcare provider right away if you have any of the following:  Weakness  Dizziness  Fainting  Fatigue  Shortness of breath  Chest pain with increased activity  A change in the usual regularity of your heartbeat, or an unusually fast heartbeat

## 2019-01-09 NOTE — DISCHARGE NOTE ADULT - ADDITIONAL INSTRUCTIONS
Please follow up with Dr. Matos (cardiology) on Monday, January 14 at 11:00am to discuss your atrial fibrillation and new medication (eliquis - blood thinner).     Please make an appointment with Dr. Cervantes within 10-14 days of discharge.

## 2019-01-09 NOTE — PROGRESS NOTE ADULT - ASSESSMENT
83 year old female with PMH -ESRD - MWF, HTN, HLD. The paient presented into the ED because of shortness of breath and not feeling well for a couple of days. She missed her HD session on Monday - not feeling not able to gofor her session at her HD unit [address] [facility]. Into the ED found to have bradycardia -potassium of 7.5 meq   required emergent hemodialysis for clearance and UF

## 2019-01-09 NOTE — PROGRESS NOTE ADULT - PROBLEM SELECTOR PLAN 2
still hypertensive which may be volume related  continue Losartan 25mg PO BID  may need to adjust dosage if SBP doesn't improve with further hemodialysis and UF

## 2019-01-09 NOTE — DISCHARGE NOTE ADULT - HOSPITAL COURSE
Patient is an 83F w PMH of ESRD on HD (MWF), nephrosclerosis, HTN, gout, COPD, OA, who presented to the ED with shortness of breath after missing dialysis. Patient started feeling dizzy and weak and missed her hemodialysis session as she felt she could not walk to the HD center.     She presented to the [facility] ED where her vitals on presentation were: T97.1, /68, HR 47, RR 24, 98% O2 on RA. On telemetry, her HR was as low as 30s-40s. On EKG, she was in a junctional bigeminy rhythm at around 60bpm. Initial labs were significant for significant electrolyte derangements: Na 125, K 7.5, Cl 87, CO2 15, BUN 92, Cr 9.33, and Anion Gap of 23. For her hyperkalemia, cardiology and dialysis were consulted. She was given 1mg atropine, calcium gluconate 1g, 10u of regular insulin, 25g D50, sodium bicarb 50 meQ, 3 albuterol nebs and 1 duoneb. She was started on BiPAP, pacer pads were placed, and she was admitted to CCU for urgent dialysis and further management of her arrhythmias     Pt underwent 2 sessions of HD with resolution of junctional bigeminy and normalization of electrolytes. Pt remained hypertensive to 180s before HD, and her home coreg was restarted. She was deemed medically stable for discharge. Patient is an 83F w PMH of ESRD on HD (MWF), nephrosclerosis, HTN, gout, COPD, OA, who presented to the ED with shortness of breath after missing dialysis. Patient started feeling dizzy and weak and missed her hemodialysis session as she felt she could not walk to the HD center.     She presented to the [facility] ED where her vitals on presentation were: T97.1, /68, HR 47, RR 24, 98% O2 on RA. On telemetry, her HR was as low as 30s-40s. On EKG, she was in a junctional bigeminy rhythm at around 60bpm. Initial labs were significant for significant electrolyte derangements: Na 125, K 7.5, Cl 87, CO2 15, BUN 92, Cr 9.33, and Anion Gap of 23. For her hyperkalemia, cardiology and dialysis were consulted. She was given 1mg atropine, calcium gluconate 1g, 10u of regular insulin, 25g D50, sodium bicarb 50 meQ, 3 albuterol nebs and 1 duoneb. She was started on BiPAP, pacer pads were placed, and she was admitted to CCU for urgent dialysis and further management of her arrhythmias     Pt underwent 2 sessions of HD with resolution of junctional bigeminy and normalization of electrolytes. Pt remained hypertensive to 180s before HD, and her home coreg was restarted. During admission, she was persistently hypertensive and losartan dose was increased from 25mg BID to 50mg BID. Due to intermittent bradycardia, coreg was decreased from 12.5 BID to 6.25 BID. For AFib, pt was started on eliquis 2.5mg BID with instructions to follow up with cardiology as outpatient. She was deemed medically stable for discharge. Patient is an 83F w PMH of ESRD on HD (MWF), nephrosclerosis, HTN, gout, COPD, OA, who presented to the ED with shortness of breath after missing dialysis. Patient started feeling dizzy and weak and missed her hemodialysis session as she felt she could not walk to the HD center.     She presented to the [facility] ED where her vitals on presentation were: T97.1, /68, HR 47, RR 24, 98% O2 on RA. On telemetry, her HR was as low as 30s-40s. On EKG, she was in a junctional bigeminy rhythm at around 60bpm. Initial labs were significant for significant electrolyte derangements: Na 125, K 7.5, Cl 87, CO2 15, BUN 92, Cr 9.33, and Anion Gap of 23. For her hyperkalemia, cardiology and dialysis were consulted. She was given 1mg atropine, calcium gluconate 1g, 10u of regular insulin, 25g D50, sodium bicarb 50 meQ, 3 albuterol nebs and 1 duoneb. She was started on BiPAP, pacer pads were placed, and she was admitted to CCU for urgent dialysis and further management of her arrhythmias     Pt underwent 2 sessions of HD with resolution of junctional bigeminy and normalization of electrolytes. Pt remained hypertensive to 180s before HD, and her home coreg was restarted. During admission, she was persistently hypertensive and losartan dose was increased from 25mg BID to 50mg BID. Due to intermittent bradycardia, coreg was decreased from 12.5 BID to 6.25 BID. For anticoagulation for her AFib, pt was started on eliquis 2.5mg BID with instructions to follow up with cardiology as outpatient. The cardiology team is aware that the patient becomes intermittently bradycardic, and her meds were adjusted to account for this. She was deemed medically stable for discharge.

## 2019-01-09 NOTE — PROGRESS NOTE ADULT - PROBLEM SELECTOR PLAN 1
Patient with new junctional bradycardia to as low as 30s in setting of severe electrolyte derangements secondary to missed dialysis session. Now HR in 50s.   - s/p atropine 1mg  - Urgent hemodialysis  - Serial EKG  - Telemetry Resolved. Patient presented with new junctional bradycardia to as low as 30s in setting of severe electrolyte derangements secondary to missed dialysis session. Now HR in 70s.   - s/p atropine 1mg  - s/p hemodialysis  - Serial EKG  - Telemetry

## 2019-01-09 NOTE — DISCHARGE NOTE ADULT - MEDICATION SUMMARY - MEDICATIONS TO TAKE
I will START or STAY ON the medications listed below when I get home from the hospital:    losartan 25 mg oral tablet  -- 1 tab(s) by mouth 2 times a day  -- Indication: For Hypertension    simvastatin 20 mg oral tablet  -- 1 tab(s) by mouth once a day (at bedtime)  -- Indication: For Hyperlipidemia    carvedilol 12.5 mg oral tablet  -- 1 tab(s) by mouth 2 times a day  -- Indication: For Hypertension    Emla 2.5%-2.5% topical cream  -- Apply on skin to affected area Monday, Wednesday, and Friday  -- Indication: For dermatological cream    Mircera 50 mcg/0.3 mL injectable solution  -- 1 each injectable once a month  -- Indication: For Anemia due to chronic kidney disease, on chronic dialysis    Venofer 20 mg/mL intravenous solution  -- 1 each intravenous prn  -- Indication: For iron deficiency anemia    Renvela 800 mg oral tablet  -- 1 tab(s) by mouth 3 times a day (with meals)  -- Indication: For Hyperphosphatemia    multivitamin  -- 1 tab(s) by mouth once a day  -- Indication: For multivitamin    folic acid 1 mg oral tablet  -- 1 tab(s) by mouth once a day  -- Indication: For vitamin    calcitriol 0.25 mcg oral capsule  -- 1 cap(s) by mouth Monday, Wednesday, and Friday  -- Indication: For vitamin    D3 1000 oral tablet  -- 2 tab(s) by mouth once a day  -- Indication: For vitamin I will START or STAY ON the medications listed below when I get home from the hospital:    losartan 25 mg oral tablet  -- 1 tab(s) by mouth 2 times a day  -- Indication: For Hypertension    apixaban 2.5 mg oral tablet  -- 1 tab(s) by mouth every 12 hours   -- Indication: For Afib    simvastatin 20 mg oral tablet  -- 1 tab(s) by mouth once a day (at bedtime)  -- Indication: For Hyperlipidemia    carvedilol 12.5 mg oral tablet  -- 1 tab(s) by mouth 2 times a day  -- Indication: For Hypertension    Emla 2.5%-2.5% topical cream  -- Apply on skin to affected area Monday, Wednesday, and Friday  -- Indication: For dermatological cream    Mircera 50 mcg/0.3 mL injectable solution  -- 1 each injectable once a month  -- Indication: For Anemia due to chronic kidney disease, on chronic dialysis    Venofer 20 mg/mL intravenous solution  -- 1 each intravenous prn  -- Indication: For iron deficiency anemia    Renvela 800 mg oral tablet  -- 1 tab(s) by mouth 3 times a day (with meals)  -- Indication: For Hyperphosphatemia    multivitamin  -- 1 tab(s) by mouth once a day  -- Indication: For multivitamin    folic acid 1 mg oral tablet  -- 1 tab(s) by mouth once a day  -- Indication: For vitamin    calcitriol 0.25 mcg oral capsule  -- 1 cap(s) by mouth Monday, Wednesday, and Friday  -- Indication: For vitamin    D3 1000 oral tablet  -- 2 tab(s) by mouth once a day  -- Indication: For vitamin I will START or STAY ON the medications listed below when I get home from the hospital:    losartan 50 mg oral tablet  -- 1 tab(s) by mouth 2 times a day   -- Do not take this drug if you are pregnant.  It is very important that you take or use this exactly as directed.  Do not skip doses or discontinue unless directed by your doctor.  Some non-prescription drugs may aggravate your condition.  Read all labels carefully.  If a warning appears, check with your doctor before taking.    -- Indication: For Hypertension    apixaban 2.5 mg oral tablet  -- 1 tab(s) by mouth every 12 hours   -- Indication: For Afib    simvastatin 20 mg oral tablet  -- 1 tab(s) by mouth once a day (at bedtime)  -- Indication: For Hyperlipidemia    Coreg 6.25 mg oral tablet  -- 1 tab(s) by mouth 2 times a day   -- It is very important that you take or use this exactly as directed.  Do not skip doses or discontinue unless directed by your doctor.  May cause drowsiness.  Alcohol may intensify this effect.  Use care when operating dangerous machinery.  Some non-prescription drugs may aggravate your condition.  Read all labels carefully.  If a warning appears, check with your doctor before taking.  Take with food or milk.    -- Indication: For Hypertension    Emla 2.5%-2.5% topical cream  -- Apply on skin to affected area Monday, Wednesday, and Friday  -- Indication: For dermatological cream    Mircera 50 mcg/0.3 mL injectable solution  -- 1 each injectable once a month  -- Indication: For Anemia due to chronic kidney disease, on chronic dialysis    Venofer 20 mg/mL intravenous solution  -- 1 each intravenous prn  -- Indication: For iron deficiency anemia    Renvela 800 mg oral tablet  -- 1 tab(s) by mouth 3 times a day (with meals)  -- Indication: For Hyperphosphatemia    multivitamin  -- 1 tab(s) by mouth once a day  -- Indication: For multivitamin    folic acid 1 mg oral tablet  -- 1 tab(s) by mouth once a day  -- Indication: For vitamin    D3 1000 oral tablet  -- 2 tab(s) by mouth once a day  -- Indication: For vitamin    calcitriol 0.25 mcg oral capsule  -- 1 cap(s) by mouth Monday, Wednesday, and Friday  -- Indication: For vitamin

## 2019-01-09 NOTE — DISCHARGE NOTE ADULT - PATIENT PORTAL LINK FT
You can access the [facility] Patient Portal, offered by [facility], by registering with the following website: [email or web address]

## 2019-01-09 NOTE — PROGRESS NOTE ADULT - PROBLEM SELECTOR PLAN 3
Patient missed HD session on Monday secondary to dizziness and weakness. Now with hyponatremia, hyperkalemia, hypochloremia, elevated anion gap.   - Given temporizing measure in ED with insulin and calcium gluconate for hyperkalemia  - Urgent hemodialysis today  - f/u Renal/HD recs  - c/w home renvela, calcitriol Patient missed HD session on Monday secondary to dizziness and weakness. Presented with hyponatremia, hyperkalemia, hypochloremia, elevated anion gap.   - Given temporizing measure in ED with insulin and calcium gluconate for hyperkalemia  - s/p HD 1/9 and 1/10  - f/u Renal/HD recs  - c/w home renvela, calcitriol    #Missed HD  Due to weakness/dizziness  - f/u U/A to evaluate for possible source of symptoms

## 2019-01-09 NOTE — PROGRESS NOTE ADULT - PROBLEM SELECTOR PLAN 4
Patient hypertensive to as high as 205/90 while on CCU. Likely due to volume overload from missing HD session  - urgent dialysis  - holding home coreg in setting of bradycardia  - c/w home 25mg BID losartan Patient hypertensive to as high as 205/90 while on CCU. Likely due to volume overload from missing HD session  - s/p dialysis 1/9 and 1/10  - will restart home coreg after HD on 1/10  - c/w home 25mg BID losartan

## 2019-01-09 NOTE — DISCHARGE NOTE ADULT - MEDICATION SUMMARY - MEDICATIONS TO CHANGE
I will SWITCH the dose or number of times a day I take the medications listed below when I get home from the hospital:  None I will SWITCH the dose or number of times a day I take the medications listed below when I get home from the hospital:    carvedilol 12.5 mg oral tablet  -- 1 tab(s) by mouth 2 times a day    losartan 25 mg oral tablet  -- 1 tab(s) by mouth 2 times a day

## 2019-01-09 NOTE — PROGRESS NOTE ADULT - ATTENDING COMMENTS
Pt is an 84 y/o woman c HTN, ESRD on HD who presents with weakness after missing HD on Mon. Noted with K 7.5    ECG: junctional bradycardia 40    afebrile, HR 90, 150/70, 98%    K 7.5 --> 4.6  Hgb 10.6  BUn 92 --> 43  Cr 4.9    ECG: nsr @ 70    - pt needed emergent dialysis  - hyperkalemia resolved and cardiac issues resolved  - pt with resp failure overnt with bipap use, now on nasal cannular  - resp failure secondary to volume overload  - assess for SW intervention for missing HD etc  - pt afebrile after isolated temp spike  - pt with fib with conversions pause with hi chads vasc, would begin anticoagulation in pt  - stable for d/c today

## 2019-01-10 ENCOUNTER — INBOUND DOCUMENT (OUTPATIENT)
Age: 84
End: 2019-01-10

## 2019-01-10 VITALS — HEART RATE: 70 BPM | OXYGEN SATURATION: 95 %

## 2019-01-10 DIAGNOSIS — I48.0 PAROXYSMAL ATRIAL FIBRILLATION: ICD-10-CM

## 2019-01-10 LAB
ANION GAP SERPL CALC-SCNC: 15 MMOL/L — SIGNIFICANT CHANGE UP (ref 5–17)
BUN SERPL-MCNC: 28 MG/DL — HIGH (ref 7–23)
CALCIUM SERPL-MCNC: 9.4 MG/DL — SIGNIFICANT CHANGE UP (ref 8.4–10.5)
CHLORIDE SERPL-SCNC: 96 MMOL/L — SIGNIFICANT CHANGE UP (ref 96–108)
CO2 SERPL-SCNC: 26 MMOL/L — SIGNIFICANT CHANGE UP (ref 22–31)
CREAT SERPL-MCNC: 4.9 MG/DL — HIGH (ref 0.5–1.3)
GLUCOSE SERPL-MCNC: 117 MG/DL — HIGH (ref 70–99)
HCT VFR BLD CALC: 33.7 % — LOW (ref 34.5–45)
HGB BLD-MCNC: 10.6 G/DL — LOW (ref 11.5–15.5)
MAGNESIUM SERPL-MCNC: 2.4 MG/DL — SIGNIFICANT CHANGE UP (ref 1.6–2.6)
MCHC RBC-ENTMCNC: 31.2 PG — SIGNIFICANT CHANGE UP (ref 27–34)
MCHC RBC-ENTMCNC: 31.5 G/DL — LOW (ref 32–36)
MCV RBC AUTO: 99.1 FL — SIGNIFICANT CHANGE UP (ref 80–100)
PHOSPHATE SERPL-MCNC: 4.8 MG/DL — HIGH (ref 2.5–4.5)
PLATELET # BLD AUTO: 203 K/UL — SIGNIFICANT CHANGE UP (ref 150–400)
POTASSIUM SERPL-MCNC: 4.6 MMOL/L — SIGNIFICANT CHANGE UP (ref 3.5–5.3)
POTASSIUM SERPL-SCNC: 4.6 MMOL/L — SIGNIFICANT CHANGE UP (ref 3.5–5.3)
RBC # BLD: 3.4 M/UL — LOW (ref 3.8–5.2)
RBC # FLD: 13.4 % — SIGNIFICANT CHANGE UP (ref 10.3–16.9)
SODIUM SERPL-SCNC: 137 MMOL/L — SIGNIFICANT CHANGE UP (ref 135–145)
WBC # BLD: 10.6 K/UL — HIGH (ref 3.8–10.5)
WBC # FLD AUTO: 10.6 K/UL — HIGH (ref 3.8–10.5)

## 2019-01-10 PROCEDURE — 85610 PROTHROMBIN TIME: CPT

## 2019-01-10 PROCEDURE — 94640 AIRWAY INHALATION TREATMENT: CPT

## 2019-01-10 PROCEDURE — 83550 IRON BINDING TEST: CPT

## 2019-01-10 PROCEDURE — 83970 ASSAY OF PARATHORMONE: CPT

## 2019-01-10 PROCEDURE — 99291 CRITICAL CARE FIRST HOUR: CPT

## 2019-01-10 PROCEDURE — 80053 COMPREHEN METABOLIC PANEL: CPT

## 2019-01-10 PROCEDURE — 84484 ASSAY OF TROPONIN QUANT: CPT

## 2019-01-10 PROCEDURE — 83735 ASSAY OF MAGNESIUM: CPT

## 2019-01-10 PROCEDURE — 82553 CREATINE MB FRACTION: CPT

## 2019-01-10 PROCEDURE — 82728 ASSAY OF FERRITIN: CPT

## 2019-01-10 PROCEDURE — 93010 ELECTROCARDIOGRAM REPORT: CPT

## 2019-01-10 PROCEDURE — 84100 ASSAY OF PHOSPHORUS: CPT

## 2019-01-10 PROCEDURE — 87486 CHLMYD PNEUM DNA AMP PROBE: CPT

## 2019-01-10 PROCEDURE — 87581 M.PNEUMON DNA AMP PROBE: CPT

## 2019-01-10 PROCEDURE — 80048 BASIC METABOLIC PNL TOTAL CA: CPT

## 2019-01-10 PROCEDURE — 83540 ASSAY OF IRON: CPT

## 2019-01-10 PROCEDURE — 93005 ELECTROCARDIOGRAM TRACING: CPT

## 2019-01-10 PROCEDURE — 99292 CRITICAL CARE ADDL 30 MIN: CPT | Mod: 25

## 2019-01-10 PROCEDURE — 86850 RBC ANTIBODY SCREEN: CPT

## 2019-01-10 PROCEDURE — 86900 BLOOD TYPING SEROLOGIC ABO: CPT

## 2019-01-10 PROCEDURE — 96365 THER/PROPH/DIAG IV INF INIT: CPT

## 2019-01-10 PROCEDURE — 86901 BLOOD TYPING SEROLOGIC RH(D): CPT

## 2019-01-10 PROCEDURE — 82550 ASSAY OF CK (CPK): CPT

## 2019-01-10 PROCEDURE — 87798 DETECT AGENT NOS DNA AMP: CPT

## 2019-01-10 PROCEDURE — 85027 COMPLETE CBC AUTOMATED: CPT

## 2019-01-10 PROCEDURE — 96375 TX/PRO/DX INJ NEW DRUG ADDON: CPT

## 2019-01-10 PROCEDURE — 87633 RESP VIRUS 12-25 TARGETS: CPT

## 2019-01-10 PROCEDURE — 71045 X-RAY EXAM CHEST 1 VIEW: CPT

## 2019-01-10 PROCEDURE — 87340 HEPATITIS B SURFACE AG IA: CPT

## 2019-01-10 PROCEDURE — 85025 COMPLETE CBC W/AUTO DIFF WBC: CPT

## 2019-01-10 PROCEDURE — 71045 X-RAY EXAM CHEST 1 VIEW: CPT | Mod: 26

## 2019-01-10 PROCEDURE — 85730 THROMBOPLASTIN TIME PARTIAL: CPT

## 2019-01-10 PROCEDURE — 94660 CPAP INITIATION&MGMT: CPT

## 2019-01-10 PROCEDURE — 90935 HEMODIALYSIS ONE EVALUATION: CPT

## 2019-01-10 PROCEDURE — 82962 GLUCOSE BLOOD TEST: CPT

## 2019-01-10 PROCEDURE — 99291 CRITICAL CARE FIRST HOUR: CPT | Mod: 25

## 2019-01-10 PROCEDURE — 82310 ASSAY OF CALCIUM: CPT

## 2019-01-10 PROCEDURE — 36415 COLL VENOUS BLD VENIPUNCTURE: CPT

## 2019-01-10 RX ORDER — CARVEDILOL PHOSPHATE 80 MG/1
1 CAPSULE, EXTENDED RELEASE ORAL
Qty: 60 | Refills: 0
Start: 2019-01-10 | End: 2019-02-08

## 2019-01-10 RX ORDER — IPRATROPIUM/ALBUTEROL SULFATE 18-103MCG
3 AEROSOL WITH ADAPTER (GRAM) INHALATION EVERY 6 HOURS
Qty: 0 | Refills: 0 | Status: DISCONTINUED | OUTPATIENT
Start: 2019-01-10 | End: 2019-01-10

## 2019-01-10 RX ORDER — LOSARTAN POTASSIUM 100 MG/1
1 TABLET, FILM COATED ORAL
Qty: 0 | Refills: 0 | DISCHARGE
Start: 2019-01-10 | End: 2019-02-08

## 2019-01-10 RX ORDER — LOSARTAN POTASSIUM 100 MG/1
1 TABLET, FILM COATED ORAL
Qty: 0 | Refills: 0 | COMMUNITY

## 2019-01-10 RX ORDER — LOSARTAN POTASSIUM 100 MG/1
1 TABLET, FILM COATED ORAL
Qty: 60 | Refills: 0
Start: 2019-01-10 | End: 2019-02-08

## 2019-01-10 RX ADMIN — Medication 3 MILLILITER(S): at 10:27

## 2019-01-10 RX ADMIN — CARVEDILOL PHOSPHATE 12.5 MILLIGRAM(S): 80 CAPSULE, EXTENDED RELEASE ORAL at 07:19

## 2019-01-10 RX ADMIN — LOSARTAN POTASSIUM 50 MILLIGRAM(S): 100 TABLET, FILM COATED ORAL at 07:19

## 2019-01-10 RX ADMIN — LOSARTAN POTASSIUM 25 MILLIGRAM(S): 100 TABLET, FILM COATED ORAL at 00:12

## 2019-01-10 RX ADMIN — Medication 3 MILLILITER(S): at 05:53

## 2019-01-10 RX ADMIN — CHLORHEXIDINE GLUCONATE 1 APPLICATION(S): 213 SOLUTION TOPICAL at 13:20

## 2019-01-10 RX ADMIN — SEVELAMER CARBONATE 1600 MILLIGRAM(S): 2400 POWDER, FOR SUSPENSION ORAL at 07:52

## 2019-01-10 RX ADMIN — Medication 3 MILLILITER(S): at 00:48

## 2019-01-10 RX ADMIN — Medication 1 MILLIGRAM(S): at 13:19

## 2019-01-10 RX ADMIN — SEVELAMER CARBONATE 1600 MILLIGRAM(S): 2400 POWDER, FOR SUSPENSION ORAL at 13:18

## 2019-01-10 RX ADMIN — Medication 2000 UNIT(S): at 13:18

## 2019-01-10 RX ADMIN — APIXABAN 2.5 MILLIGRAM(S): 2.5 TABLET, FILM COATED ORAL at 07:19

## 2019-01-10 NOTE — PROGRESS NOTE ADULT - PROBLEM SELECTOR PLAN 2
on Coreg  episodes of bradycardia with HR decreasing to the 30s  consider EP evaluation  HTN- better controlled now after 6 Liter UF with hemodialysis

## 2019-01-10 NOTE — PROGRESS NOTE ADULT - SUBJECTIVE AND OBJECTIVE BOX
CC: SOB      INTERVAL HISTORY:  feels better  denies SOB      ROS: No chest pain, no sob, no abd pain. No n/v/d    PAST MEDICAL & SURGICAL HISTORY:  Gout  HLD (hyperlipidemia)  HTN (hypertension)  CKD (chronic kidney disease)  AV fistula: LUE      PHYSICAL EXAM:  T(C): 36.6 (01-10-19 @ 07:01), Max: 37.6 (01-10-19 @ 00:43)  HR: 76 (01-10-19 @ 07:00)  BP: 157/82 (01-10-19 @ 07:00) (123/57 - 203/73)  RR: 18 (01-10-19 @ 07:00)  SpO2: 99% (01-10-19 @ 08:42)  Wt(kg): --  I&O's Summary    09 Jan 2019 07:01  -  10 Brad 2019 07:00  --------------------------------------------------------  IN: 270 mL / OUT: 3200 mL / NET: -2930 mL      Weight 80.3 (01-08 @ 12:40)  General: AAO x 3,  NAD.  HEENT: moist mucous membranes, no pallor/cyanosis.  Neck: no JVD visible.  Cardiac: S1, S2. RRR. No murmurs   Respratory:rhonchi  Abdomen: soft. nontender. nondistended  Skin: no rashes.  Extremities: no LE edema b/l  Access: + bruit in LAF      DATA:                        10.6<L>  10.6<H> )-----------( 203      ( 10 Brad 2019 06:35 )             33.7<L>    Ferritin, Serum: 5070 ng/mL <H> (01-09 @ 10:58)   Iron Total, Serum: 55 ug/dL (01-09 @ 10:58)     137    |  96     |  28<H>  ----------------------------<  117<H>  Ca:9.4   (10 Brad 2019 06:35)  4.6     |  26     |  4.90<H>      eGFR if Non : 8 <L>  eGFR if : 9 <L>    TPro  6.7    /  Alb  3.4    /  TBili  0.5    /  DBili  x      /  AST  161<H>  /  ALT  110<H>  /  AlkPhos  102    09 Jan 2019 06:21                    MEDICATIONS  (STANDING):  ALBUTerol/ipratropium for Nebulization 3 milliLiter(s) Nebulizer every 6 hours  apixaban 2.5 milliGRAM(s) Oral every 12 hours  carvedilol 12.5 milliGRAM(s) Oral every 12 hours  chlorhexidine 2% Cloths 1 Application(s) Topical daily  cholecalciferol 2000 Unit(s) Oral daily  folic acid 1 milliGRAM(s) Oral daily  losartan 50 milliGRAM(s) Oral every 12 hours  sevelamer hydrochloride 1600 milliGRAM(s) Oral three times a day with meals  simvastatin 20 milliGRAM(s) Oral at bedtime    MEDICATIONS  (PRN):

## 2019-01-10 NOTE — PROGRESS NOTE ADULT - ASSESSMENT
83F w PMH of ESRD on HD (MWF), nephrosclerosis, HTN, gout, COPD, OA, who presents to the ED with shortness of breath after missing dialysis. Patient started feeling dizzy and weak yesterday and missed her hemodialysis session as she felt she could not walk to the HD center.  presented with SOB and hyperkalemia requiring emergency hemodialysis

## 2019-01-10 NOTE — PROGRESS NOTE ADULT - PROBLEM SELECTOR PLAN 1
two consecutive days of hemodialysis  6.4L of fluid removed resulting in a more euvolemic state  next hemodialysis to be done on Friday 1/11

## 2019-01-15 ENCOUNTER — APPOINTMENT (OUTPATIENT)
Dept: HEART AND VASCULAR | Facility: CLINIC | Age: 84
End: 2019-01-15
Payer: MEDICARE

## 2019-01-15 VITALS
HEIGHT: 58 IN | SYSTOLIC BLOOD PRESSURE: 110 MMHG | BODY MASS INDEX: 33.16 KG/M2 | OXYGEN SATURATION: 98 % | DIASTOLIC BLOOD PRESSURE: 80 MMHG | HEART RATE: 56 BPM | WEIGHT: 157.98 LBS | TEMPERATURE: 98.4 F

## 2019-01-15 DIAGNOSIS — E87.2 ACIDOSIS: ICD-10-CM

## 2019-01-15 DIAGNOSIS — N18.6 END STAGE RENAL DISEASE: ICD-10-CM

## 2019-01-15 DIAGNOSIS — E87.1 HYPO-OSMOLALITY AND HYPONATREMIA: ICD-10-CM

## 2019-01-15 DIAGNOSIS — N26.9 RENAL SCLEROSIS, UNSPECIFIED: ICD-10-CM

## 2019-01-15 DIAGNOSIS — Z78.9 OTHER SPECIFIED HEALTH STATUS: ICD-10-CM

## 2019-01-15 DIAGNOSIS — Z99.2 DEPENDENCE ON RENAL DIALYSIS: ICD-10-CM

## 2019-01-15 DIAGNOSIS — R00.1 BRADYCARDIA, UNSPECIFIED: ICD-10-CM

## 2019-01-15 DIAGNOSIS — D63.1 ANEMIA IN CHRONIC KIDNEY DISEASE: ICD-10-CM

## 2019-01-15 DIAGNOSIS — E87.5 HYPERKALEMIA: ICD-10-CM

## 2019-01-15 DIAGNOSIS — Z72.3 LACK OF PHYSICAL EXERCISE: ICD-10-CM

## 2019-01-15 DIAGNOSIS — I48.0 PAROXYSMAL ATRIAL FIBRILLATION: ICD-10-CM

## 2019-01-15 DIAGNOSIS — I12.0 HYPERTENSIVE CHRONIC KIDNEY DISEASE WITH STAGE 5 CHRONIC KIDNEY DISEASE OR END STAGE RENAL DISEASE: ICD-10-CM

## 2019-01-15 DIAGNOSIS — E87.8 OTHER DISORDERS OF ELECTROLYTE AND FLUID BALANCE, NOT ELSEWHERE CLASSIFIED: ICD-10-CM

## 2019-01-15 DIAGNOSIS — J96.90 RESPIRATORY FAILURE, UNSPECIFIED, UNSPECIFIED WHETHER WITH HYPOXIA OR HYPERCAPNIA: ICD-10-CM

## 2019-01-15 DIAGNOSIS — J44.9 CHRONIC OBSTRUCTIVE PULMONARY DISEASE, UNSPECIFIED: ICD-10-CM

## 2019-01-15 PROCEDURE — 99215 OFFICE O/P EST HI 40 MIN: CPT | Mod: 25

## 2019-01-15 PROCEDURE — 99401 PREV MED CNSL INDIV APPRX 15: CPT

## 2019-01-15 PROCEDURE — 93000 ELECTROCARDIOGRAM COMPLETE: CPT

## 2019-01-15 PROCEDURE — 99367 TEAM CONF W/O PAT BY PHYS: CPT

## 2019-01-15 NOTE — REASON FOR VISIT
[Follow-Up - From Hospitalization] : follow-up of a recent hospitalization for [Dyspnea] : dyspnea [Discharge Date: ___] : Discharge Date: [unfilled]

## 2019-01-15 NOTE — PHYSICAL EXAM
[General Appearance - Well Developed] : well developed [Normal Appearance] : normal appearance [Well Groomed] : well groomed [General Appearance - Well Nourished] : well nourished [No Deformities] : no deformities [General Appearance - In No Acute Distress] : no acute distress [Normal Conjunctiva] : the conjunctiva exhibited no abnormalities [Eyelids - No Xanthelasma] : the eyelids demonstrated no xanthelasmas [Normal Oral Mucosa] : normal oral mucosa [No Oral Pallor] : no oral pallor [No Oral Cyanosis] : no oral cyanosis [Normal Jugular Venous A Waves Present] : normal jugular venous A waves present [Normal Jugular Venous V Waves Present] : normal jugular venous V waves present [No Jugular Venous Hazel A Waves] : no jugular venous hazel A waves [Respiration, Rhythm And Depth] : normal respiratory rhythm and effort [Exaggerated Use Of Accessory Muscles For Inspiration] : no accessory muscle use [Auscultation Breath Sounds / Voice Sounds] : lungs were clear to auscultation bilaterally [Heart Rate And Rhythm] : heart rate and rhythm were normal [Heart Sounds] : normal S1 and S2 [Murmurs] : no murmurs present [Abdomen Soft] : soft [Abdomen Tenderness] : non-tender [Abdomen Mass (___ Cm)] : no abdominal mass palpated [Abnormal Walk] : normal gait [Gait - Sufficient For Exercise Testing] : the gait was sufficient for exercise testing [Nail Clubbing] : no clubbing of the fingernails [Cyanosis, Localized] : no localized cyanosis [Petechial Hemorrhages (___cm)] : no petechial hemorrhages [Skin Color & Pigmentation] : normal skin color and pigmentation [] : no rash [No Venous Stasis] : no venous stasis [Skin Lesions] : no skin lesions [No Skin Ulcers] : no skin ulcer [No Xanthoma] : no  xanthoma was observed [Oriented To Time, Place, And Person] : oriented to person, place, and time [Affect] : the affect was normal [Mood] : the mood was normal [No Anxiety] : not feeling anxious

## 2019-01-15 NOTE — DISCUSSION/SUMMARY
[FreeTextEntry1] : The number of diagnostic and/or management options \par  ESRD on HD (MWF) - dr Cervantes \par \par HTN - \par  losartan dose  50mg BID. Due to intermittent bradycardia, coreg  6.25 BID. \par \par Afib\par coreg\par  eliquis 2.5mg BID the patient becomes intermittently bradycardic, and her meds were \par adjusted to account for this.\par \par \par \par Labs, radiology: ekg echo\par \par Aspirin therapy: no \par \par LDL: statin\par \par Medical team conference with interdisciplinary team of health care professionals, patient and/or family not present, discussion of the case with another healthcare provider: Janna\par \par \par Overall Risk: High Complexity\par \par \par Additional detailed discussion regarding prevention including but not limiting to goal SBP<130mmHg, Mediterranean diet discussion, No salt diet, diabetes screening, and goal LDL<100. Smoking cessation, EtOH use and depression screen where applicable\par Exercise counseling and plan discussed with patient\par will readdress and reinforce prevention in subsequent visits

## 2019-01-15 NOTE — HISTORY OF PRESENT ILLNESS
[FreeTextEntry1] : 83F w PMH of ESRD on HD (MWF), nephrosclerosis, HTN, gout, COPD, \par OA, who presented to the ED with shortness of breath after missing dialysis. \par Patient started feeling dizzy and weak and missed her hemodialysis session as \par she felt she could not walk to the HD center. \par \par She presented to the [facility] ED where her vitals on presentation were: T97.1, BP \par 149/68, HR 47, RR 24, 98% O2 on RA. On telemetry, her HR was as low as 30s-40s. \par On EKG, she was in a junctional bigeminy rhythm at around 60bpm. Initial labs \par were significant for significant electrolyte derangements: Na 125, K 7.5, Cl \par 87, CO2 15, BUN 92, Cr 9.33, and Anion Gap of 23. For her hyperkalemia, \par cardiology and dialysis were consulted. She was given 1mg atropine, calcium \par gluconate 1g, 10u of regular insulin, 25g D50, sodium bicarb 50 meQ, 3 \par albuterol nebs and 1 duoneb. She was started on BiPAP, pacer pads were placed, \par and she was admitted to CCU for urgent dialysis and further management of her \par arrhythmias \par \par Pt underwent 2 sessions of HD with resolution of junctional bigeminy and \par normalization of electrolytes. Pt remained hypertensive to 180s before HD, and \par her home coreg was restarted. During admission, she was persistently \par hypertensive and losartan dose was increased from 25mg BID to 50mg BID. Due to \par intermittent bradycardia, coreg was decreased from 12.5 BID to 6.25 BID. For \par anticoagulation for her AFib, pt was started on eliquis 2.5mg BID with \par instructions to follow up with cardiology as outpatient. The cardiology team is \par aware that the patient becomes intermittently bradycardic, and her meds were \par adjusted to account for this.\par \par 1/15/19 USOH, 100% compliant with meds\par location: chest\par duration: na\par  modifying factors: na\par timing: na\par severity: 0/10\par EKG unchanged from prior (in chart)\par consultation notes reviewed where appropriate\par

## 2019-02-14 ENCOUNTER — APPOINTMENT (OUTPATIENT)
Dept: HEART AND VASCULAR | Facility: CLINIC | Age: 84
End: 2019-02-14
Payer: MEDICARE

## 2019-02-14 VITALS
BODY MASS INDEX: 33.37 KG/M2 | OXYGEN SATURATION: 97 % | TEMPERATURE: 98.3 F | HEIGHT: 58 IN | HEART RATE: 80 BPM | DIASTOLIC BLOOD PRESSURE: 70 MMHG | WEIGHT: 159 LBS | SYSTOLIC BLOOD PRESSURE: 150 MMHG

## 2019-02-14 PROCEDURE — 93000 ELECTROCARDIOGRAM COMPLETE: CPT

## 2019-02-14 PROCEDURE — 99401 PREV MED CNSL INDIV APPRX 15: CPT | Mod: 25

## 2019-02-14 PROCEDURE — 99366 TEAM CONF W/PAT BY HC PROF: CPT

## 2019-02-14 PROCEDURE — 99214 OFFICE O/P EST MOD 30 MIN: CPT | Mod: 25

## 2019-02-14 RX ORDER — CHOLECALCIFEROL (VITAMIN D3) 25 MCG
25 MCG CAPSULE ORAL DAILY
Refills: 0 | Status: ACTIVE | COMMUNITY

## 2019-02-14 NOTE — HISTORY OF PRESENT ILLNESS
[FreeTextEntry1] : 83F w PMH of ESRD on HD (MWF), nephrosclerosis, HTN, gout, COPD, \par OA, who presented to the ED with shortness of breath after missing dialysis. \par Patient started feeling dizzy and weak and missed her hemodialysis session as \par she felt she could not walk to the HD center. \par \par She presented to the [facility] ED where her vitals on presentation were: T97.1, BP \par 149/68, HR 47, RR 24, 98% O2 on RA. On telemetry, her HR was as low as 30s-40s. \par On EKG, she was in a junctional bigeminy rhythm at around 60bpm. Initial labs \par were significant for significant electrolyte derangements: Na 125, K 7.5, Cl \par 87, CO2 15, BUN 92, Cr 9.33, and Anion Gap of 23. For her hyperkalemia, \par cardiology and dialysis were consulted. She was given 1mg atropine, calcium \par gluconate 1g, 10u of regular insulin, 25g D50, sodium bicarb 50 meQ, 3 \par albuterol nebs and 1 duoneb. She was started on BiPAP, pacer pads were placed, \par and she was admitted to CCU for urgent dialysis and further management of her \par arrhythmias \par \par Pt underwent 2 sessions of HD with resolution of junctional bigeminy and \par normalization of electrolytes. Pt remained hypertensive to 180s before HD, and \par her home coreg was restarted. During admission, she was persistently \par hypertensive and losartan dose was increased from 25mg BID to 50mg BID. Due to \par intermittent bradycardia, coreg was decreased from 12.5 BID to 6.25 BID. For \par anticoagulation for her AFib, pt was started on eliquis 2.5mg BID with \par instructions to follow up with cardiology as outpatient. The cardiology team is \par aware that the patient becomes intermittently bradycardic, and her meds were \par adjusted to account for this.\par \par 1/15/19 USOH, 100% compliant with meds\par location: chest\par duration: na\par  modifying factors: na\par timing: na\par severity: 0/10\par EKG unchanged from prior (in chart)\par consultation notes reviewed where appropriate\par  \par 2/14/19 ran out of meds yesturday\par states nephrologist dr salazar retired\par USOH, 100% compliant with meds\par location: chest\par duration: na\par  modifying factors: na\par timing: na\par severity: 0/10\par EKG unchanged from prior (in chart)\par consultation notes reviewed where appropriate\par

## 2019-02-14 NOTE — DISCUSSION/SUMMARY
[FreeTextEntry1] : The number of diagnostic and/or management options \par  ESRD on HD (MWF) - dr liriano referral given\par \par HTN - \par  losartan dose 50mg BID. , coreg 12.5 BID. \par \par Afib\par coreg\par  eliquis 2.5mg BID\par \par \par \par Labs, radiology: ekg echo\par \par Aspirin therapy: no \par \par LDL: statin\par \par Medical team conference with interdisciplinary team of health care professionals, patient and/or family not present, discussion of the case with another healthcare provider: deandra\par \par \par Overall Risk: High Complexity\par \par \par Additional detailed discussion regarding prevention including but not limiting to goal SBP<130mmHg, Mediterranean diet discussion, No salt diet, diabetes screening, and goal LDL<100. Smoking cessation, EtOH use and depression screen where applicable\par Exercise counseling and plan discussed with patient\par will readdress and reinforce prevention in subsequent visits. \par

## 2019-07-22 NOTE — PROGRESS NOTE ADULT - PROVIDER SPECIALTY LIST ADULT
07/22/19  Spoke to Salina Maynard at [facility] who confirmed that patient was admitted for hospice services on 7/19/19. Episode resolved at this time.  [facility]

## 2019-08-13 ENCOUNTER — INPATIENT (INPATIENT)
Facility: HOSPITAL | Age: 84
LOS: 1 days | Discharge: HOME | DRG: 981 | End: 2019-08-15
Payer: MEDICARE

## 2019-08-13 VITALS
DIASTOLIC BLOOD PRESSURE: 79 MMHG | RESPIRATION RATE: 19 BRPM | HEART RATE: 46 BPM | SYSTOLIC BLOOD PRESSURE: 123 MMHG | OXYGEN SATURATION: 94 % | TEMPERATURE: 98 F

## 2019-08-13 DIAGNOSIS — I77.0 ARTERIOVENOUS FISTULA, ACQUIRED: Chronic | ICD-10-CM

## 2019-08-13 LAB
ALBUMIN SERPL ELPH-MCNC: 3.9 G/DL — SIGNIFICANT CHANGE UP (ref 3.3–5)
ALBUMIN SERPL ELPH-MCNC: 4.1 G/DL — SIGNIFICANT CHANGE UP (ref 3.3–5)
ALBUMIN SERPL ELPH-MCNC: 4.1 G/DL — SIGNIFICANT CHANGE UP (ref 3.3–5)
ALP SERPL-CCNC: 121 U/L — HIGH (ref 40–120)
ALP SERPL-CCNC: SIGNIFICANT CHANGE UP U/L (ref 40–120)
ALP SERPL-CCNC: SIGNIFICANT CHANGE UP U/L (ref 40–120)
ALT FLD-CCNC: 41 U/L — SIGNIFICANT CHANGE UP (ref 10–45)
ALT FLD-CCNC: SIGNIFICANT CHANGE UP U/L (ref 10–45)
ALT FLD-CCNC: SIGNIFICANT CHANGE UP U/L (ref 10–45)
ANION GAP SERPL CALC-SCNC: 19 MMOL/L — HIGH (ref 5–17)
ANION GAP SERPL CALC-SCNC: 22 MMOL/L — HIGH (ref 5–17)
ANION GAP SERPL CALC-SCNC: 24 MMOL/L — HIGH (ref 5–17)
ANION GAP SERPL CALC-SCNC: 25 MMOL/L — HIGH (ref 5–17)
APTT BLD: 35.9 SEC — SIGNIFICANT CHANGE UP (ref 27.5–36.3)
AST SERPL-CCNC: 89 U/L — HIGH (ref 10–40)
AST SERPL-CCNC: SIGNIFICANT CHANGE UP U/L (ref 10–40)
AST SERPL-CCNC: SIGNIFICANT CHANGE UP U/L (ref 10–40)
BASOPHILS # BLD AUTO: 0.04 K/UL — SIGNIFICANT CHANGE UP (ref 0–0.2)
BASOPHILS NFR BLD AUTO: 0.5 % — SIGNIFICANT CHANGE UP (ref 0–2)
BILIRUB SERPL-MCNC: 0.7 MG/DL — SIGNIFICANT CHANGE UP (ref 0.2–1.2)
BILIRUB SERPL-MCNC: 0.7 MG/DL — SIGNIFICANT CHANGE UP (ref 0.2–1.2)
BILIRUB SERPL-MCNC: 0.8 MG/DL — SIGNIFICANT CHANGE UP (ref 0.2–1.2)
BUN SERPL-MCNC: 48 MG/DL — HIGH (ref 7–23)
BUN SERPL-MCNC: 91 MG/DL — HIGH (ref 7–23)
BUN SERPL-MCNC: 93 MG/DL — HIGH (ref 7–23)
BUN SERPL-MCNC: 96 MG/DL — HIGH (ref 7–23)
CALCIUM SERPL-MCNC: 10.1 MG/DL — SIGNIFICANT CHANGE UP (ref 8.4–10.5)
CALCIUM SERPL-MCNC: 8.7 MG/DL — SIGNIFICANT CHANGE UP (ref 8.4–10.5)
CALCIUM SERPL-MCNC: 9 MG/DL — SIGNIFICANT CHANGE UP (ref 8.4–10.5)
CALCIUM SERPL-MCNC: 9.7 MG/DL — SIGNIFICANT CHANGE UP (ref 8.4–10.5)
CHLORIDE SERPL-SCNC: 76 MMOL/L — LOW (ref 96–108)
CHLORIDE SERPL-SCNC: 78 MMOL/L — LOW (ref 96–108)
CHLORIDE SERPL-SCNC: 79 MMOL/L — LOW (ref 96–108)
CHLORIDE SERPL-SCNC: 87 MMOL/L — LOW (ref 96–108)
CK MB CFR SERPL CALC: 4 NG/ML — SIGNIFICANT CHANGE UP (ref 0–6.7)
CK SERPL-CCNC: 191 U/L — HIGH (ref 25–170)
CO2 SERPL-SCNC: 16 MMOL/L — LOW (ref 22–31)
CO2 SERPL-SCNC: 18 MMOL/L — LOW (ref 22–31)
CO2 SERPL-SCNC: 18 MMOL/L — LOW (ref 22–31)
CO2 SERPL-SCNC: 22 MMOL/L — SIGNIFICANT CHANGE UP (ref 22–31)
CREAT SERPL-MCNC: 5.53 MG/DL — HIGH (ref 0.5–1.3)
CREAT SERPL-MCNC: 9.15 MG/DL — HIGH (ref 0.5–1.3)
CREAT SERPL-MCNC: 9.3 MG/DL — HIGH (ref 0.5–1.3)
CREAT SERPL-MCNC: 9.54 MG/DL — HIGH (ref 0.5–1.3)
EOSINOPHIL # BLD AUTO: 0.03 K/UL — SIGNIFICANT CHANGE UP (ref 0–0.5)
EOSINOPHIL NFR BLD AUTO: 0.4 % — SIGNIFICANT CHANGE UP (ref 0–6)
GLUCOSE SERPL-MCNC: 111 MG/DL — HIGH (ref 70–99)
GLUCOSE SERPL-MCNC: 145 MG/DL — HIGH (ref 70–99)
GLUCOSE SERPL-MCNC: 164 MG/DL — HIGH (ref 70–99)
GLUCOSE SERPL-MCNC: 279 MG/DL — HIGH (ref 70–99)
HCT VFR BLD CALC: 32.7 % — LOW (ref 34.5–45)
HGB BLD-MCNC: 10.9 G/DL — LOW (ref 11.5–15.5)
IMM GRANULOCYTES NFR BLD AUTO: 0.5 % — SIGNIFICANT CHANGE UP (ref 0–1.5)
INR BLD: 1.22 — HIGH (ref 0.88–1.16)
LYMPHOCYTES # BLD AUTO: 1.63 K/UL — SIGNIFICANT CHANGE UP (ref 1–3.3)
LYMPHOCYTES # BLD AUTO: 19.4 % — SIGNIFICANT CHANGE UP (ref 13–44)
MAGNESIUM SERPL-MCNC: 2.3 MG/DL — SIGNIFICANT CHANGE UP (ref 1.6–2.6)
MAGNESIUM SERPL-MCNC: 3 MG/DL — HIGH (ref 1.6–2.6)
MCHC RBC-ENTMCNC: 32.8 PG — SIGNIFICANT CHANGE UP (ref 27–34)
MCHC RBC-ENTMCNC: 33.3 GM/DL — SIGNIFICANT CHANGE UP (ref 32–36)
MCV RBC AUTO: 98.5 FL — SIGNIFICANT CHANGE UP (ref 80–100)
MONOCYTES # BLD AUTO: 0.76 K/UL — SIGNIFICANT CHANGE UP (ref 0–0.9)
MONOCYTES NFR BLD AUTO: 9 % — SIGNIFICANT CHANGE UP (ref 2–14)
NEUTROPHILS # BLD AUTO: 5.92 K/UL — SIGNIFICANT CHANGE UP (ref 1.8–7.4)
NEUTROPHILS NFR BLD AUTO: 70.2 % — SIGNIFICANT CHANGE UP (ref 43–77)
NRBC # BLD: 0 /100 WBCS — SIGNIFICANT CHANGE UP (ref 0–0)
NT-PROBNP SERPL-SCNC: HIGH PG/ML (ref 0–300)
PHOSPHATE SERPL-MCNC: 5.1 MG/DL — HIGH (ref 2.5–4.5)
PLATELET # BLD AUTO: 209 K/UL — SIGNIFICANT CHANGE UP (ref 150–400)
POTASSIUM SERPL-MCNC: 4.2 MMOL/L — SIGNIFICANT CHANGE UP (ref 3.5–5.3)
POTASSIUM SERPL-MCNC: 6.1 MMOL/L — HIGH (ref 3.5–5.3)
POTASSIUM SERPL-MCNC: SIGNIFICANT CHANGE UP MMOL/L (ref 3.5–5.3)
POTASSIUM SERPL-SCNC: 4.2 MMOL/L — SIGNIFICANT CHANGE UP (ref 3.5–5.3)
POTASSIUM SERPL-SCNC: 6.1 MMOL/L — HIGH (ref 3.5–5.3)
POTASSIUM SERPL-SCNC: SIGNIFICANT CHANGE UP MMOL/L (ref 3.5–5.3)
PROT SERPL-MCNC: 7.1 G/DL — SIGNIFICANT CHANGE UP (ref 6–8.3)
PROT SERPL-MCNC: 7.9 G/DL — SIGNIFICANT CHANGE UP (ref 6–8.3)
PROT SERPL-MCNC: 8 G/DL — SIGNIFICANT CHANGE UP (ref 6–8.3)
PROTHROM AB SERPL-ACNC: 13.9 SEC — HIGH (ref 10–12.9)
RBC # BLD: 3.32 M/UL — LOW (ref 3.8–5.2)
RBC # FLD: 13.4 % — SIGNIFICANT CHANGE UP (ref 10.3–14.5)
SODIUM SERPL-SCNC: 116 MMOL/L — CRITICAL LOW (ref 135–145)
SODIUM SERPL-SCNC: 119 MMOL/L — CRITICAL LOW (ref 135–145)
SODIUM SERPL-SCNC: 121 MMOL/L — LOW (ref 135–145)
SODIUM SERPL-SCNC: 128 MMOL/L — LOW (ref 135–145)
SODIUM SERPL-SCNC: 128 MMOL/L — LOW (ref 135–145)
TROPONIN T SERPL-MCNC: 0.02 NG/ML — HIGH (ref 0–0.01)
WBC # BLD: 8.42 K/UL — SIGNIFICANT CHANGE UP (ref 3.8–10.5)
WBC # FLD AUTO: 8.42 K/UL — SIGNIFICANT CHANGE UP (ref 3.8–10.5)

## 2019-08-13 PROCEDURE — 99223 1ST HOSP IP/OBS HIGH 75: CPT | Mod: GC

## 2019-08-13 PROCEDURE — 93010 ELECTROCARDIOGRAM REPORT: CPT

## 2019-08-13 PROCEDURE — 99291 CRITICAL CARE FIRST HOUR: CPT

## 2019-08-13 PROCEDURE — 99223 1ST HOSP IP/OBS HIGH 75: CPT

## 2019-08-13 PROCEDURE — 99221 1ST HOSP IP/OBS SF/LOW 40: CPT

## 2019-08-13 PROCEDURE — 90935 HEMODIALYSIS ONE EVALUATION: CPT | Mod: GC

## 2019-08-13 PROCEDURE — 99223 1ST HOSP IP/OBS HIGH 75: CPT | Mod: GC,25

## 2019-08-13 PROCEDURE — 71045 X-RAY EXAM CHEST 1 VIEW: CPT | Mod: 26

## 2019-08-13 RX ORDER — DEXTROSE 50 % IN WATER 50 %
25 SYRINGE (ML) INTRAVENOUS ONCE
Refills: 0 | Status: COMPLETED | OUTPATIENT
Start: 2019-08-13 | End: 2019-08-13

## 2019-08-13 RX ORDER — CHLORHEXIDINE GLUCONATE 213 G/1000ML
1 SOLUTION TOPICAL
Refills: 0 | Status: DISCONTINUED | OUTPATIENT
Start: 2019-08-13 | End: 2019-08-13

## 2019-08-13 RX ORDER — HYDRALAZINE HCL 50 MG
10 TABLET ORAL ONCE
Refills: 0 | Status: COMPLETED | OUTPATIENT
Start: 2019-08-13 | End: 2019-08-13

## 2019-08-13 RX ORDER — HEPARIN SODIUM 5000 [USP'U]/ML
5000 INJECTION INTRAVENOUS; SUBCUTANEOUS EVERY 8 HOURS
Refills: 0 | Status: DISCONTINUED | OUTPATIENT
Start: 2019-08-13 | End: 2019-08-13

## 2019-08-13 RX ORDER — INSULIN HUMAN 100 [IU]/ML
10 INJECTION, SOLUTION SUBCUTANEOUS ONCE
Refills: 0 | Status: COMPLETED | OUTPATIENT
Start: 2019-08-13 | End: 2019-08-13

## 2019-08-13 RX ORDER — IPRATROPIUM/ALBUTEROL SULFATE 18-103MCG
3 AEROSOL WITH ADAPTER (GRAM) INHALATION ONCE
Refills: 0 | Status: DISCONTINUED | OUTPATIENT
Start: 2019-08-13 | End: 2019-08-13

## 2019-08-13 RX ORDER — CALCIUM GLUCONATE 100 MG/ML
2 VIAL (ML) INTRAVENOUS ONCE
Refills: 0 | Status: COMPLETED | OUTPATIENT
Start: 2019-08-13 | End: 2019-08-13

## 2019-08-13 RX ORDER — SODIUM BICARBONATE 1 MEQ/ML
50 SYRINGE (ML) INTRAVENOUS ONCE
Refills: 0 | Status: COMPLETED | OUTPATIENT
Start: 2019-08-13 | End: 2019-08-13

## 2019-08-13 RX ORDER — ALBUTEROL 90 UG/1
7.5 AEROSOL, METERED ORAL ONCE
Refills: 0 | Status: COMPLETED | OUTPATIENT
Start: 2019-08-13 | End: 2019-08-13

## 2019-08-13 RX ORDER — FUROSEMIDE 40 MG
60 TABLET ORAL ONCE
Refills: 0 | Status: COMPLETED | OUTPATIENT
Start: 2019-08-13 | End: 2019-08-13

## 2019-08-13 RX ORDER — SODIUM CHLORIDE 9 MG/ML
1000 INJECTION, SOLUTION INTRAVENOUS
Refills: 0 | Status: DISCONTINUED | OUTPATIENT
Start: 2019-08-13 | End: 2019-08-14

## 2019-08-13 RX ORDER — IPRATROPIUM/ALBUTEROL SULFATE 18-103MCG
3 AEROSOL WITH ADAPTER (GRAM) INHALATION ONCE
Refills: 0 | Status: COMPLETED | OUTPATIENT
Start: 2019-08-13 | End: 2019-08-13

## 2019-08-13 RX ORDER — APIXABAN 2.5 MG/1
2.5 TABLET, FILM COATED ORAL
Refills: 0 | Status: DISCONTINUED | OUTPATIENT
Start: 2019-08-13 | End: 2019-08-15

## 2019-08-13 RX ADMIN — Medication 3 MILLILITER(S): at 09:56

## 2019-08-13 RX ADMIN — Medication 10 MILLIGRAM(S): at 10:59

## 2019-08-13 RX ADMIN — SODIUM CHLORIDE 40 MILLILITER(S): 9 INJECTION, SOLUTION INTRAVENOUS at 21:00

## 2019-08-13 RX ADMIN — Medication 200 GRAM(S): at 09:54

## 2019-08-13 RX ADMIN — Medication 25 GRAM(S): at 09:55

## 2019-08-13 RX ADMIN — Medication 60 MILLIGRAM(S): at 09:54

## 2019-08-13 RX ADMIN — ALBUTEROL 10 MILLIGRAM(S): 90 AEROSOL, METERED ORAL at 09:55

## 2019-08-13 RX ADMIN — HEPARIN SODIUM 5000 UNIT(S): 5000 INJECTION INTRAVENOUS; SUBCUTANEOUS at 15:00

## 2019-08-13 RX ADMIN — INSULIN HUMAN 10 UNIT(S): 100 INJECTION, SOLUTION SUBCUTANEOUS at 09:55

## 2019-08-13 RX ADMIN — APIXABAN 2.5 MILLIGRAM(S): 2.5 TABLET, FILM COATED ORAL at 17:40

## 2019-08-13 RX ADMIN — Medication 50 MILLIEQUIVALENT(S): at 10:59

## 2019-08-13 RX ADMIN — Medication 200 GRAM(S): at 09:02

## 2019-08-13 NOTE — H&P ADULT - NSHPPHYSICALEXAM_GEN_ALL_CORE
Vital Signs Last 12 Hrs  T(F): 96.9 (08-13-19 @ 11:55), Max: 97.5 (08-13-19 @ 08:38)  HR: 76 (08-13-19 @ 14:00) (35 - 76)  BP: 169/62 (08-13-19 @ 14:00) (123/79 - 240/81)  BP(mean): 104 (08-13-19 @ 14:00) (93 - 123)  RR: 16 (08-13-19 @ 14:00) (16 - 31)  SpO2: 99% (08-13-19 @ 14:00) (94% - 100%)  I&O's Summary      PHYSICAL EXAM:  Constitutional: NAD, comfortable in bed, currently receiving dialysis, calm cooperative, flat affect and speaking in short phrases  HEENT: NC/AT, EOMI, no conjunctival pallor or scleral icterus, MMM  Respiratory: Normal rate, rhythm, depth, effort. CTAB. No w/r/r.   Cardiovascular: irregular, normal S1 and S2, no m/r/g.   Gastrointestinal: soft NTND  Extremities: wwp; no cyanosis, clubbing or edema, dialysis access in LUE  Vascular: Pulses equal and strong throughout.   Neurological: AAOx3, strength and sensation grossly intact throughout.

## 2019-08-13 NOTE — ED PROVIDER NOTE - CARDIAC, MLM
Normal rate, regular rhythm.  Heart sounds S1, S2.  No murmurs, rubs or gallops. Bradycardic.  Heart sounds S1, S2.

## 2019-08-13 NOTE — ED ADULT NURSE REASSESSMENT NOTE - NS ED NURSE REASSESS COMMENT FT1
POC BMP: Na: 112, K:8.3, Cl: 84, iCa: 1.05, TCO2: 19, Glu: 265, BUN: >107, Creat: 11.4, Hb.2, Hct: 36, AnGAP: 19

## 2019-08-13 NOTE — H&P ADULT - ATTENDING COMMENTS
89 year old female with h/o Atrial fibrillation with ESRD on HD with acute hypoxic respiratory failure secondary to acute cardiogenic pulmonary edema with uncontrolled BP with electrolyte abnormality with bradycardia.  Plan:  - Transfer to ICU  - Urgent HD  - Bicarbonate, calcium gluconate, insulin and glucose given  - Place external pacemaker in case of symptomatic bradycardia  - Tele monitoring

## 2019-08-13 NOTE — CONSULT NOTE ADULT - SUBJECTIVE AND OBJECTIVE BOX
Patient is a 84y old  Female who presents with a chief complaint of Missed HD (13 Aug 2019 09:53)    Nephrology consult placed in regards to ESRD on HD.  ESRD on HD MWF via LUE AVF.  Last HD on 8/7.  Missed HD on 8/9, 8/12 b/o dizziness.  Presents to ED c/o dizziness and sob.  Labs pending, hyperkalemia suspected.  EKG showing severe junctional bradycardia.  Rx given: calcium gluconate, sodium bicarb, insulin with dextrose, furosemide, albuterol.  Pending ICU evaluation.  Patient needs emergent HD.    HPI:  Patient is an 85 yo F w PMH of ESRD on HD (MWF), nephrosclerosis, HTN, gout, COPD, OA, who presented to the ED with symptoms of lightheadedness for 3 days and SOB for one day. Pt says lightheadedness started 3 days ago after last dialysis session 8/9. Symptoms have been constant and worsened with any movement especially standing from sitting position. Pt says she was unable to go to dialysis yesterday due to lightheadedness and this morning developed symptoms of SOB prompting her to come into ED today. Associated symptoms include chronic nonproductive cough, RAMIREZ, orthopnea, LE edema. Pt denies CP, Abdominal pain, dysuria, nausea/vomiting, fevers/chills. Denies sick contacts or recent travel.   Of note, pt presented with similar complaints Jan 2019 and found to have junctional bradycardia; admitted to CCU. Symptoms resolved after dialysis and pt discharged after 2 days.     PAST MEDICAL & SURGICAL HISTORY:  Gout  HLD (hyperlipidemia)  HTN (hypertension)  CKD (chronic kidney disease)  AV fistula: LUE      Allergies    No Known Allergies    Intolerances    Norvasc (Other)      FAMILY HISTORY:  No pertinent family history in first degree relatives      SOCIAL HISTORY:  denies tobacco/EtOH/drugs    MEDICATIONS  (STANDING):  chlorhexidine 2% Cloths 1 Application(s) Topical <User Schedule>  hydrALAZINE Injectable 10 milliGRAM(s) IV Push once  sodium bicarbonate  Injectable 50 milliEquivalent(s) IV Push once    MEDICATIONS  (PRN):      Vital Signs Last 24 Hrs  T(C): 36.4 (08-13-19 @ 08:38), Max: 36.4 (08-13-19 @ 08:38)  HR: 46 (08-13-19 @ 08:38) (46 - 46)  BP: 123/79 (08-13-19 @ 08:38) (123/79 - 123/79)  RR: 19 (08-13-19 @ 08:38) (19 - 19)  SpO2: 94% (08-13-19 @ 08:38) (94% - 94%)  Wt(kg): --    REVIEW OF SYSTEMS:  Gen: + dizziness, No fever  CVS: No chest pain  Resp: +shortness of breath  Abd: No nausea/ No vomiting/No abdominal pain  CNS: No headache      PHYSICAL EXAM:  GENERAL: well-developed, well nourished, alert, no acute distress at present  NECK: supple, No JVD  CHEST/LUNG: bilateral expiratory wheezing, equal air entry   HEART: bradycardia 35-40/ min  ABDOMEN: Soft, Nontender, +BS, No flank tenderness  EXTREMITIES: 2+ pitting edema, no calf tenderness bilateral  Neurology: AAOx3, no focal neurological deficit  SKIN: No rashes  ACCESS: LUE AVF, good thrill and bruit appreciated      CAPILLARY BLOOD GLUCOSE      POCT Blood Glucose.: 271 mg/dL (13 Aug 2019 09:58)      I&O's Summary        LABS:                                                   08-13-19 @ 09:50    116<LL>  |  76<L>  |  93<H>  ----------------------------<  279<H>  see note   |  18<L>  |  9.15<H>                                                 08-13-19 @ 09:08    119<LL>  |  78<L>  |  91<H>  ----------------------------<  145<H>  see note   |  16<L>  |  9.30<H>    Ca    9.7      13 Aug 2019 09:50  Ca    9.0      13 Aug 2019 09:08  Mg     3.0     08-13    TPro  7.9  /  Alb  4.1  /  TBili  0.7  /  DBili  x   /  AST  see note  /  ALT  see note  /  AlkPhos  see note  08-13  TPro  8.0  /  Alb  4.1  /  TBili  0.8  /  DBili  x   /  AST  see note  /  ALT  see note  /  AlkPhos  see note  08-13                          10.9   8.42  )-----------( 209      ( 13 Aug 2019 09:08 )             32.7     CBC Full  -  ( 13 Aug 2019 09:08 )  WBC Count : 8.42 K/uL  Hemoglobin : 10.9 g/dL  Hematocrit : 32.7 %  Platelet Count - Automated : 209 K/uL  Mean Cell Volume : 98.5 fl        PT/INR - ( 13 Aug 2019 09:08 )   PT: 13.9 sec;   INR: 1.22          PTT - ( 13 Aug 2019 09:08 )  PTT:35.9 sec  CARDIAC MARKERS ( 13 Aug 2019 09:08 )  x     / 0.02 ng/mL / 191 U/L / x     / 4.0 ng/mL          RADIOLOGY & ADDITIONAL TESTS:      < from: Xray Chest 1 View-PORTABLE IMMEDIATE (08.13.19 @ 09:11) >    EXAM:  XR CHEST PORTABLE IMMED 1V                          PROCEDURE DATE:  08/13/2019          INTERPRETATION:  Clinical history/reason for exam: Shortness of breath.    Single frontal view.    Comparison: January 10, 2019.    Findings/  impression: Stable cardiomegaly, thoracic aortic calcification. Lungs and   mediastinum, unremarkable. Stable bony structures.    < end of copied text >

## 2019-08-13 NOTE — CONSULT NOTE ADULT - SUBJECTIVE AND OBJECTIVE BOX
HPI: 84 year old woman, w/ a PMHx of ESRD (on HD M/W/F), HLD, and HTN presenting to ED with complaints of SOB x1 day. Patient states that she was feeling "unwell" yesterday - further described as weak and dizzy - so she was unable to go to her scheduled HD. Since then, she has been experiencing progressive dyspnea, initially w/ exertion and now at rest. Patient also states that she had "a few minutes" of burning chest discomfort yesterday that was on-radiating and resolved on its own. Patient otherwise denies palpitations and LOC. Cardiology service consulted for bradycardia, and patient w/ HR of 41 on telemetry monitor at the time of examination.    ROS: A 10-point review of systems was otherwise negative.    PAST MEDICAL & SURGICAL HISTORY:  Gout  HLD (hyperlipidemia)  HTN (hypertension)  ESRD (end stage kidney disease)  AV fistula: LUE    SOCIAL HISTORY:  FAMILY HISTORY:  No pertinent family history in first degree relatives    ALLERGIES: 	  No Known Allergies  Norvasc (Other)    Home Medications:  calcitriol 0.25 mcg oral capsule: 1 cap(s) orally Monday, Wednesday, and Friday (08 Jan 2019 16:10)  D3 1000 oral tablet: 2 tab(s) orally once a day (08 Jan 2019 16:10)  Emla 2.5%-2.5% topical cream: Apply topically to affected area Monday, Wednesday, and Friday (08 Jan 2019 16:10)  folic acid 1 mg oral tablet: 1 tab(s) orally once a day (08 Jan 2019 16:10)  Mircera 50 mcg/0.3 mL injectable solution: 1 each injectable once a month (08 Jan 2019 16:10)  multivitamin: 1 tab(s) orally once a day (08 Jan 2019 16:10)  Renvela 800 mg oral tablet: 1 tab(s) orally 3 times a day (with meals) (08 Jan 2019 16:10)  simvastatin 20 mg oral tablet: 1 tab(s) orally once a day (at bedtime) (08 Jan 2019 16:10)  Venofer 20 mg/mL intravenous solution: 1 each intravenous prn (08 Jan 2019 16:10)    MEDICATIONS:  ALBUTerol    0.083%. 7.5 milliGRAM(s) Nebulizer once  ALBUTerol/ipratropium for Nebulization. 3 milliLiter(s) Nebulizer once  calcium gluconate IVPB 2 Gram(s) IV Intermittent Once  dextrose 50% Injectable 25 Gram(s) IV Push Once  furosemide   Injectable 60 milliGRAM(s) IV Push Once  insulin regular  human recombinant. 10 Unit(s) IV Push once    PHYSICAL EXAM:  T(C): 36.4 (08-13-19 @ 08:38), Max: 36.4 (08-13-19 @ 08:38)  HR: 46 (08-13-19 @ 08:38) (46 - 46)  BP: 123/79 (08-13-19 @ 08:38) (123/79 - 123/79)  RR: 19 (08-13-19 @ 08:38) (19 - 19)  SpO2: 94% (08-13-19 @ 08:38) (94% - 94%)  Wt(kg): --    GEN: Awake, alert.    HEENT: No JVD.   RESP: diffuse wheezing.  CV: RRR, normal s1/s2. No m/r/g. No S3.  ABD: Soft, NTND. BS+  EXT: Warm. No edema, clubbing, or cyanosis.   NEURO: AAOx3. No focal deficits.    I&O's Summary    LABS:	                         10.9   8.42  )-----------( 209      ( 13 Aug 2019 09:08 )             32.7     08-13    119<LL>  |  78<L>  |  91<H>  ----------------------------<  145<H>  see note   |  16<L>  |  9.30<H>    Ca    9.0      13 Aug 2019 09:08  Mg     3.0     08-13    TPro  8.0  /  Alb  4.1  /  TBili  0.8  /  DBili  x   /  AST  see note  /  ALT  see note  /  AlkPhos  see note  08-13    proBNP: Serum Pro-Brain Natriuretic Peptide: 91927 pg/mL (08-13 @ 09:08)    EKG: Sinus bradycardia w/ bigeminy (junctional escape beats)

## 2019-08-13 NOTE — PROGRESS NOTE ADULT - SUBJECTIVE AND OBJECTIVE BOX
Patient was seen and evaluated on dialysis.   HR: 76 (08-13-19 @ 14:00)  BP: 169/62 (08-13-19 @ 14:00)  Wt(kg): -- 79.1      08-13    121<L>  |  79<L>  |  96<H>  ----------------------------<  111<H>  6.1<H>   |  18<L>  |  9.54<H>    Ca    10.1      13 Aug 2019 12:15  Mg     3.0     08-13    TPro  7.1  /  Alb  3.9  /  TBili  0.7  /  DBili  x   /  AST  89<H>  /  ALT  41  /  AlkPhos  121<H>  08-13    Dialysis:   Dialyzer:   160    QB: 250  Initially K bath: 1K (when K was not available 2/2 hemolysis),   once K reading was available,  @ 6.1, K bath was changed to 2K.  Na bath: 130  (Na 119 on admission)  Goal UF:   3.0    L   over      180         min  Patient is tolerating the procedure well.   Continue full treatment as prescribed and modified.

## 2019-08-13 NOTE — H&P ADULT - NSHPLABSRESULTS_GEN_ALL_CORE
LABS:                        10.9   8.42  )-----------( 209      ( 13 Aug 2019 09:08 )             32.7     08-13    121<L>  |  79<L>  |  96<H>  ----------------------------<  111<H>  6.1<H>   |  18<L>  |  9.54<H>    Ca    10.1      13 Aug 2019 12:15  Mg     3.0     08-13    TPro  7.1  /  Alb  3.9  /  TBili  0.7  /  DBili  x   /  AST  89<H>  /  ALT  41  /  AlkPhos  121<H>  08-13    PT/INR - ( 13 Aug 2019 09:08 )   PT: 13.9 sec;   INR: 1.22          PTT - ( 13 Aug 2019 09:08 )  PTT:35.9 sec

## 2019-08-13 NOTE — PROGRESS NOTE ADULT - SUBJECTIVE AND OBJECTIVE BOX
7L accepting from 7E    Hospital Course:  Pt is an 83 yo F with pmh ESRD (dialysis MWF), hld, htn, COPD, paroxysmal afib (on eliquis, reports compliance) who presented to the ED with dizziness x1d. Pt reports dizzy spells 2x/d, sometimes provoked by going from seated to standing position. One day ago she also had SOB. Pt has been on dialysis for 5 months (followed by Kir[facility] Cervantes), last HD 8/9. Pt skipped HD as she was not feeling well. In ED pt bradycardic with HR 41. Labs pertinent for K 6.1, Na 119. Pt given Ca gluconate x2, albuterol nebulizers, and insulin 10 with D50. Pt received emergent HD in the ED. Patient has had prior admission for same scenario in Jan 2019 but was admitted to CCU due to course c/b junctional rhythm and bigeminy, which resolved after HD. Pt admitted to ICU for further monitoring and now stepped down to 7L as labs have begun to normalize.    Subjective:  Pt seen and examined at bedside in no acute distress. No complaints at present. 7pm K 4.2. Na on arrival 119 with improvement to 121 at noon, up to 128 at 6p concern for rate of increase so started on D5 40 cc/h and will continue to trend Na.    REVIEW OF SYSTEMS:    CONSTITUTIONAL: No weakness, fevers or chills.   EYES/ENT: No visual changes;  No vertigo or throat pain   NECK: No pain or stiffness  RESPIRATORY: No cough, wheezing, hemoptysis; No shortness of breath  CARDIOVASCULAR: No chest pain or palpitations  GASTROINTESTINAL: No abdominal or epigastric pain. No nausea, vomiting, or hematemesis; No diarrhea or constipation. No melena or hematochezia.  GENITOURINARY: No dysuria, frequency or hematuria  NEUROLOGICAL: No numbness or weakness  SKIN: No itching, burning, rashes, or lesions   All other review of systems is negative unless indicated above.    VITAL SIGNS:  T(C): 37.2 (08-13-19 @ 18:00), Max: 37.2 (08-13-19 @ 18:00)  T(F): 99 (08-13-19 @ 18:00), Max: 99 (08-13-19 @ 18:00)  HR: 72 (08-13-19 @ 19:30) (35 - 86)  BP: 148/67 (08-13-19 @ 19:30) (123/79 - 240/81)  BP(mean): 96 (08-13-19 @ 19:30) (74 - 123)  RR: 18 (08-13-19 @ 19:30) (16 - 31)  SpO2: 97% (08-13-19 @ 19:30) (94% - 100%)  Wt(kg): --    PHYSICAL EXAM:  Constitutional: WDWN resting comfortably in bed; NAD  Head: NC/AT  Eyes: PERRL, EOMI, anicteric sclera  ENT: no nasal discharge; MMM  Neck: supple; no JVD   Respiratory: CTA B/L; no W/R/R, no retractions  Cardiac: +S1/S2; RRR; no M/R/G  Gastrointestinal: soft, NT/ND; no rebound or guarding; +BSx4  Extremities: WWP, no clubbing or cyanosis; 1+ LE edema B/L  Vascular: 2+ radial, DP/PT pulses B/L  Dermatologic: skin warm, dry and intact; no rashes, wounds, or scars  Neurologic: AAOx3; no focal deficits  Psychiatric: affect and characteristics of appearance, verbalizations, behaviors are appropriate    MEDICATIONS  (STANDING):  apixaban 2.5 milliGRAM(s) Oral two times a day  dextrose 5%. 1000 milliLiter(s) (40 mL/Hr) IV Continuous <Continuous>    MEDICATIONS  (PRN):    Allergies    No Known Allergies    Intolerances    Norvasc (Other)      LABS:                        10.9   8.42  )-----------( 209      ( 13 Aug 2019 09:08 )             32.7     08-13    128<L>  |  x   |  x   ----------------------------<  x   x    |  x   |  x     Ca    8.7      13 Aug 2019 17:47  Phos  5.1     08-13  Mg     2.3     08-13    TPro  7.1  /  Alb  3.9  /  TBili  0.7  /  DBili  x   /  AST  89<H>  /  ALT  41  /  AlkPhos  121<H>  08-13    PT/INR - ( 13 Aug 2019 09:08 )   PT: 13.9 sec;   INR: 1.22          PTT - ( 13 Aug 2019 09:08 )  PTT:35.9 sec    CAPILLARY BLOOD GLUCOSE      POCT Blood Glucose.: 271 mg/dL (13 Aug 2019 09:58)      RADIOLOGY & ADDITIONAL TESTS: Reviewed. 7L accepting from 7E    Hospital Course:  Pt is an 85 yo F with pmh ESRD (dialysis MWF), hld, htn, COPD, paroxysmal afib (on eliquis, reports compliance) who presented to the ED with dizziness x1d. Pt reports dizzy spells 2x/d, sometimes provoked by going from seated to standing position. One day ago she also had SOB. Pt has been on dialysis for 5 months (followed by Kir[facility] Cervantes), last HD 8/9. Pt skipped HD as she was not feeling well. In ED pt bradycardic with HR 41. Labs pertinent for K 6.1, Na 119. Pt given Ca gluconate x2, albuterol nebulizers, and insulin 10 with D50. Pt received emergent HD in the ED. Patient has had prior admission for same scenario in Jan 2019 but was admitted to CCU due to course c/b junctional rhythm and bigeminy, which resolved after HD. Pt admitted to ICU for further monitoring and now stepped down to 7L as labs have begun to normalize.    Subjective:  Pt seen and examined at bedside in no acute distress. No complaints at present. 7pm K 4.2. Na on arrival 119 with improvement to 121 at noon, up to 128 at 6p concern for rate of increase so started on D5 40 cc/h and will continue to trend Na.    REVIEW OF SYSTEMS:    CONSTITUTIONAL: No weakness, fevers or chills.   EYES/ENT: No visual changes;  No vertigo or throat pain   NECK: No pain or stiffness  RESPIRATORY: No cough, wheezing, hemoptysis; No shortness of breath  CARDIOVASCULAR: No chest pain or palpitations  GASTROINTESTINAL: No abdominal or epigastric pain. No nausea, vomiting, or hematemesis; No diarrhea or constipation. No melena or hematochezia.  GENITOURINARY: No dysuria, frequency or hematuria  NEUROLOGICAL: No numbness or weakness  SKIN: No itching, burning, rashes, or lesions   All other review of systems is negative unless indicated above.    VITAL SIGNS:  T(C): 37.2 (08-13-19 @ 18:00), Max: 37.2 (08-13-19 @ 18:00)  T(F): 99 (08-13-19 @ 18:00), Max: 99 (08-13-19 @ 18:00)  HR: 72 (08-13-19 @ 19:30) (35 - 86)  BP: 148/67 (08-13-19 @ 19:30) (123/79 - 240/81)  BP(mean): 96 (08-13-19 @ 19:30) (74 - 123)  RR: 18 (08-13-19 @ 19:30) (16 - 31)  SpO2: 97% (08-13-19 @ 19:30) (94% - 100%)  Wt(kg): --    PHYSICAL EXAM:  Constitutional: WDWN resting comfortably in bed; NAD  Head: NC/AT  Eyes: PERRL, EOMI, anicteric sclera  ENT: no nasal discharge; MMM  Neck: supple; no JVD   Respiratory: CTA B/L; no W/R/R, no retractions  Cardiac: +S1/S2; RRR; no M/R/G  Gastrointestinal: soft, NT/ND; no rebound or guarding; +BSx4  Extremities: WWP, no clubbing or cyanosis; 1+ LE edema B/L  Vascular: 2+ radial, DP/PT pulses B/L  Dermatologic: skin warm, dry and intact; no rashes, wounds, or scars  Neurologic: AAOx3; no focal deficits  Psychiatric: affect and characteristics of appearance, verbalizations, behaviors are appropriate    MEDICATIONS  (STANDING):  apixaban 2.5 milliGRAM(s) Oral two times a day  dextrose 5%. 1000 milliLiter(s) (40 mL/Hr) IV Continuous <Continuous>    MEDICATIONS  (PRN):    Allergies    No Known Allergies    Intolerances    Norvasc (Other)      LABS:                        10.9   8.42  )-----------( 209      ( 13 Aug 2019 09:08 )             32.7     08-13    128<L>  |  x   |  x   ----------------------------<  x   x    |  x   |  x     Ca    8.7      13 Aug 2019 17:47  Phos  5.1     08-13  Mg     2.3     08-13    TPro  7.1  /  Alb  3.9  /  TBili  0.7  /  DBili  x   /  AST  89<H>  /  ALT  41  /  AlkPhos  121<H>  08-13    PT/INR - ( 13 Aug 2019 09:08 )   PT: 13.9 sec;   INR: 1.22          PTT - ( 13 Aug 2019 09:08 )  PTT:35.9 sec    CAPILLARY BLOOD GLUCOSE      POCT Blood Glucose.: 271 mg/dL (13 Aug 2019 09:58)      RADIOLOGY & ADDITIONAL TESTS: Reviewed.

## 2019-08-13 NOTE — ED PROVIDER NOTE - CLINICAL SUMMARY MEDICAL DECISION MAKING FREE TEXT BOX
85 y/o F with PMHx of CKD, HLD, and HTN presents to ED with SOB since yesterday. last dialysis Friday, skipped yesterday due to feeling SOB. No Hx of CAD or CABG, but patient states has had episode of hyperkalemia in the past. Plan for labs, x-ray, and give calcium due to concern of hyperkalemia. 85 y/o F with PMHx of CKD, HLD, and HTN presents to ED with SOB since yesterday. last dialysis Friday, skipped yesterday due to feeling SOB. No Hx of CAD or CABG, but patient states has had episode of hyperkalemia in the past. Plan for labs, x-ray, and give calcium gluconate due to concern for hyperkalemia. 83 y/o F with PMHx of CKD, HLD, and HTN presents to ED with SOB since yesterday. last dialysis Friday, skipped yesterday due to feeling SOB. No Hx of CAD or CABG, but patient states has had episode of hyperkalemia in the past. Plan for labs, x-ray, and give calcium gluconate due to concern for hyperkalemia. Refer to nephrology for possible emergent dialysis. 85 y/o F with PMHx of CKD, HLD, and HTN presents to ED with SOB since yesterday. last dialysis Friday, skipped yesterday due to feeling SOB. No Hx of CAD or CABG, but patient states has had episode of hyperkalemia in the past. Bradycardic with HR of 41 in ED. Plan for labs, x-ray, and give calcium gluconate due to concern for hyperkalemia. Refer to nephrology for possible emergent dialysis. 85 y/o F with PMHx of CKD, HLD, and HTN presents to ED with SOB since yesterday. last dialysis Friday, skipped yesterday due to feeling SOB. No Hx of CAD or CABG, but patient states has had episode of hyperkalemia in the past. Bradycardic with HR of 41 in ED. Plan for labs, x-ray, and give calcium gluconate due to concern for hyperkalemia. Pt given Hyperkalemia cocktail and Nephrology emergently consulted for emergent dialysis. CXR with some congestion. Labs with potassium of 8 and Na 119. MICU consulted for admission. Cardiology consulted per Nephrology request. Pt admitted to MICU. Stable in ED.

## 2019-08-13 NOTE — ED PROVIDER NOTE - OBJECTIVE STATEMENT
83 y/o F with PMHx of CKD, HLD, and HTN presents to ED with complaints of SOB since yesterday. States she has constant SOB, especially worsened by walking, with associated central chest tightness without radiation. Has been on dialysis 5 months, last dialysis Fri, skipped Monday due to feeling unwell. Patient states she has had episodes of hyperkalemia in the past. Bradycardic with HR of 41 in ED. Denies Hx of CABG or CAD, smoking, or any other acute complaints. Takes Eliquis. 83 y/o F with PMHx of CKD, HLD, and HTN presents to ED with complaints of SOB since yesterday. States she has constant SOB, especially worsened by walking, with associated central chest tightness without radiation. Has been on dialysis 5 months (followed by Dr. Kirill Cervantes), last dialysis Fri, skipped Monday due to feeling unwell. Patient states she has had episodes of hyperkalemia in the past. Bradycardic with HR of 41 in ED. Denies Hx of CABG or CAD, smoking, or any other acute complaints. Takes Eliquis. 83 y/o F with PMH of ESRD on dialysis (M,W,F), HLD, and HTN presents to ED with complaints of SOB and dizziness since yesterday. States she has constant SOB, especially worsened by walking, with associated central chest tightness without radiation. Has been on dialysis 5 months (followed by Dr. Kirill Cervantes), last dialysis Fri, skipped Monday due to feeling unwell. Patient states she has had episodes of hyperkalemia in the past. Bradycardic with HR of 41 in ED. Denies Hx of CABG or CAD, smoking, or any other acute complaints. Takes Eliquis. No LOC, focal neuro sxs. 85 y/o F with PMH of ESRD on dialysis (M,W,F), HLD, and HTN presents to ED with complaints of SOB and dizziness since yesterday. States she has constant SOB, especially worsened by walking, with associated dizziness and central chest tightness without radiation. Has been on dialysis for 5 months sec to kidney failure (followed by Dr. Kirill Cervantes), last dialysis Fri, skipped Monday due to feeling unwell. Patient states she has had episodes of hyperkalemia in the past. Bradycardic with HR of 41 in ED. Denies Hx of CABG or CAD, smoking, or any other acute complaints. Takes Eliquis. No LOC, focal neuro sxs.

## 2019-08-13 NOTE — ED ADULT NURSE NOTE - MUSCULOSKELETAL ASSESSMENT
Progress Notes by Eb Ward MD at 07/10/18 10:44 AM     Author:  Eb Ward MD Service:  (none) Author Type:  Physician     Filed:  07/12/18 07:33 AM Encounter Date:  7/10/2018 Status:  Signed     :  Eb Ward MD (Physician)            Leeroy Fox  is a 22 year old male here for annual physical.    He currently reports:[AD1.1T]    Going to [employer] school this fall    Feels fine but is not exercising as much[AD1.2M]     Leeroy's BMI is 34.67, which is[AD1.1T] outside of normal parameters.  Patient counseled on nutrition, exercise and healthy lifestyle.[AD1.1M]      Does patient exercise?[AD1.1T] Yes - Type: Cycling Frequency: 3 times a week[AD1.1M]  Was counseling given:[AD1.1T] Yes[AD1.1M]    Depression Screening:  Over the past 2 weeks, has patient felt down, depressed or hopeless?[AD1.1T] No[AD1.1M]  Over the past 2 weeks, has patient felt little interest or pleasure in doing things?[AD1.1T] No[AD1.1M]    On the basis of the above screen, the following is initiated:[AD1.1T]  Normal screen, continue to monitor for symptoms over time[AD1.1M]    Use of Aspirin for Primary Prevention:[AD1.1T] Patient is not on aspirin, risks and benefits discussed.[AD1.2M]          Past Medical History:     Diagnosis  Date   • ADD (attention deficit disorder)     on adderall     • Asthma     on singulair     • Seasonal allergies[AD1.2T]        Current outpatient prescriptions prior to encounter       Medication  Sig Dispense Refill   • Montelukast Sodium (SINGULAIR OR) Take  by mouth.     • amphetamine-dextroamphetamine (ADDERALL XR, 30MG,) 30 MG 24 hr Cap Take 1 Cap by mouth every morning. 30 Cap 0   • amphetamine-dextroamphetamine (ADDERALL, 10MG,) 10 MG tablet Take 1 Tab by mouth daily as needed. 30 Tab 0[AD1.1T]       Family History      Problem  Relation Age of Onset   • Diabetes Mother    • * Healthy Father    • * Healthy Sister    • * Healthy Brother        Social History     Social History      •  Marital status:  Single     Spouse name: N/A   • Number of children:  N/A   • Years of education:  N/A     Occupational History    • Not on file.     Social History Main Topics       • Smoking status:   Never Smoker   • Smokeless tobacco:   Never Used      Comment: non-smoking household    • Alcohol use   0.0 oz/week     0 Standard drinks or equivalent per week        Comment: occasional     • Drug use:   No   • Sexual activity:   Not on file     Other Topics  Concern   • .... Medical Equipment .... No   • Cpap No   • Occupational Hazards No   • Oxygen No   • Other Home Medical Equipment No   • Financial Barriers Impacting Healthcare No   • Cane No   • .... Lifestyle .... Yes   • Support System Yes   • Walker No   • Hazardous Hobbies No   • Cultural Needs No   • Wheel Chair No   • Seat Belt Yes     Social History Narrative     Study history would like to go on to [employer] school going to [employer][AD1.2T]       ROS:  He denies vision changes, chest pain, shortness of breath, dysuria, new skin rash, otherwise all systems were negative except for those stated above.    Physical Exam:[AD1.1T]  /84  Pulse 88  Temp 98.4 °F (36.9 °C)  Ht 6' 2\" (1.88 m)  Wt 270 lb (122.5 kg)  BMI 34.67 kg/m2[AD1.2T]     General:  Pleasant, in no acute distress, sitting on exam table  Eyes:  Pupils are equally round and reactive to light  Oropharynx:  Clear  Neck:  Supple  Cardiac:  Regular rate and rhythm, no murmurs appreciated  Lungs:  Clear to auscultation bilaterally  Abdomen:  Soft, Non-tender in all 4 quadrants, Non-distended, bowel sounds appreciated  Extremities:  No lower extremity edema  Lymphatics:  No cervical lymphadenopathy  Psych:  Appropriate affect        Lab Results      Component  Value Date    CHOL 156 07/26/2011    HDL 37 07/26/2011     07/26/2011    TRIG 89 07/26/2011     Lab Results      Component  Value Date    GLUCOSE 84 07/26/2011[AD1.1T]           Leeroy was seen today for  physical.    Diagnoses and all orders for this visit:    Encounter for general adult medical examination without abnormal findings[AD1.2T]  Labs ordered[AD1.2M]     Attention deficit hyperactivity disorder (ADHD), combined type[AD1.2T]  Continue adderall XR 30 mg daily + 10 mg short acting as needed for afternoon    Asthma  No issues, on albuterol and singulair[AD1.2M]      We discussed the following:  Healthy diet and exercise in detail.    follow up visit[AD1.1T]  1 year or as needed[AD1.2M]   Electronically Signed by:    Eb Ward MD , 7/10/2018[AD1.1T]          Revision History        User Key Date/Time User Provider Type Action    > AD1.2 07/12/18 07:33 AM Eb Ward MD Physician Sign     AD1.1 07/10/18 10:44 AM Eb Ward MD Physician     M - Manual, T - Template             WDL

## 2019-08-13 NOTE — ED PROVIDER NOTE - MUSCULOSKELETAL, MLM
Spine appears normal, range of motion is not limited, no muscle or joint tenderness Spine appears normal, range of motion is not limited, no muscle or joint tenderness. Bilateral LE edema. Spine appears normal, range of motion is not limited

## 2019-08-13 NOTE — ED ADULT TRIAGE NOTE - CHIEF COMPLAINT QUOTE
Verbalized she felt like the room is spinning and she's more short of breath yesterday that's why she missed dialysis.  Last HD 8/9/19 via Left AV fistula.  Speaking clearly and coherently in full sentences

## 2019-08-13 NOTE — CONSULT NOTE ADULT - SUBJECTIVE AND OBJECTIVE BOX
Patient is an 83 yo F w PMH of ESRD on HD (MWF), nephrosclerosis, HTN, gout, COPD, OA, who presented to the ED    ROS:  Otherwise negative, except as specified in HPI.    PMH: ESRD on HD (MWF), nephrosclerosis, HTN, gout, COPD, OA    PSH: s/p LAVF    FH: CAD (mother)    SH: Denies smoking, alcohol use/ilicit drug use     ALLERGIES: NKDA    MEDICATIONS: Carvedilol 12.5mg BID, unsure of other home meds.     VITAL SIGNS:  ICU Vital Signs Last 24 Hrs  T(C): 36.4 (13 Aug 2019 08:38), Max: 36.4 (13 Aug 2019 08:38)  T(F): 97.5 (13 Aug 2019 08:38), Max: 97.5 (13 Aug 2019 08:38)  HR: 46 (13 Aug 2019 08:38) (46 - 46)  BP: 123/79 (13 Aug 2019 08:38) (123/79 - 123/79)  BP(mean): --  ABP: --  ABP(mean): --  RR: 19 (13 Aug 2019 08:38) (19 - 19)  SpO2: 94% (13 Aug 2019 08:38) (94% - 94%)    CAPILLARY BLOOD GLUCOSE      POCT Blood Glucose.: 271 mg/dL (13 Aug 2019 09:58)      PHYSICAL EXAM:  Constitutional: resting comfortably in bed, in NAD  HEENT: NC/AT; PERRL, anicteric sclera; no oropharyngeal erythema or exudates; MMM  Neck: supple, no appreciable JVD  Respiratory: Wheezing b/l with bibasilar crackles   Cardiovascular: +S1/S2, RRR  Gastrointestinal: abdomen soft, NT/ND  Extremities: 2+ edema bilaterally   Neurological: AO x3, CN intact, neg finger to nose, muscle strength 5/5 b/l, sensation intact b/l.     LABS:                        10.9   8.42  )-----------( 209      ( 13 Aug 2019 09:08 )             32.7     08-13    119<LL>  |  78<L>  |  91<H>  ----------------------------<  145<H>  see note   |  16<L>  |  9.30<H>    Ca    9.0      13 Aug 2019 09:08  Mg     3.0     08-13    TPro  8.0  /  Alb  4.1  /  TBili  0.8  /  DBili  x   /  AST  see note  /  ALT  see note  /  AlkPhos  see note  08-13    PT/INR - ( 13 Aug 2019 09:08 )   PT: 13.9 sec;   INR: 1.22          PTT - ( 13 Aug 2019 09:08 )  PTT:35.9 sec    CARDIAC MARKERS ( 13 Aug 2019 09:08 )  x     / 0.02 ng/mL / 191 U/L / x     / 4.0 ng/mL            EKG: Reviewed.    RADIOLOGY & ADDITIONAL TESTS: Reviewed. Patient is an 83 yo F w PMH of ESRD on HD (MWF), nephrosclerosis, HTN, gout, COPD, OA, who presented to the ED with symptoms of lightheadedness for 3 days and SOB for one day. Pt says lightheadedness started 3 days ago after last dialysis session 8/9. Symptoms have been constant and worsened with any movement especially standing from sitting position. Pt says she was unable to go to dialysis yesterday due to lightheadedness and this morning developed symptoms of SOB prompting her to come into ED today. Assoicated symptoms include chronic nonproductive cough, RAMIREZ, orthopnea, LE edema. Pt denies CP, Abdominal pain, dysuria, nausea/vomtiing, fevers/chills. Denies sick contacts or recnt travel.   Of note, pt presented with similar complaints Jan 2019 and found to have junctional bradycardia; admitted to CCU. Symptoms resolved after dialysis and pt discharged after 2 days.     ROS:  Otherwise negative, except as specified in HPI.    PMH: ESRD on HD (MWF), nephrosclerosis, HTN, gout, COPD, OA    PSH: s/p LAVF    FH: CAD (mother)    SH: Denies smoking, alcohol use/ilicit drug use     ALLERGIES: NKDA    MEDICATIONS: Carvedilol 12.5mg BID, unsure of other home meds.     VITAL SIGNS:  ICU Vital Signs Last 24 Hrs  T(C): 36.4 (13 Aug 2019 08:38), Max: 36.4 (13 Aug 2019 08:38)  T(F): 97.5 (13 Aug 2019 08:38), Max: 97.5 (13 Aug 2019 08:38)  HR: 46 (13 Aug 2019 08:38) (46 - 46)  BP: 123/79 (13 Aug 2019 08:38) (123/79 - 123/79)  BP(mean): --  ABP: --  ABP(mean): --  RR: 19 (13 Aug 2019 08:38) (19 - 19)  SpO2: 94% (13 Aug 2019 08:38) (94% - 94%)    CAPILLARY BLOOD GLUCOSE      POCT Blood Glucose.: 271 mg/dL (13 Aug 2019 09:58)      PHYSICAL EXAM:  Constitutional: resting comfortably in bed, in NAD  HEENT: NC/AT; PERRL, anicteric sclera; no oropharyngeal erythema or exudates; MMM  Neck: supple, no appreciable JVD  Respiratory: Wheezing b/l with bibasilar crackles   Cardiovascular: +S1/S2, RRR  Gastrointestinal: abdomen soft, NT/ND  Extremities: 2+ edema bilaterally   Neurological: AO x3, CN intact, neg finger to nose, muscle strength 5/5 b/l, sensation intact b/l.     LABS:                        10.9   8.42  )-----------( 209      ( 13 Aug 2019 09:08 )             32.7     08-13    119<LL>  |  78<L>  |  91<H>  ----------------------------<  145<H>  see note   |  16<L>  |  9.30<H>    Ca    9.0      13 Aug 2019 09:08  Mg     3.0     08-13    TPro  8.0  /  Alb  4.1  /  TBili  0.8  /  DBili  x   /  AST  see note  /  ALT  see note  /  AlkPhos  see note  08-13    PT/INR - ( 13 Aug 2019 09:08 )   PT: 13.9 sec;   INR: 1.22          PTT - ( 13 Aug 2019 09:08 )  PTT:35.9 sec    CARDIAC MARKERS ( 13 Aug 2019 09:08 )  x     / 0.02 ng/mL / 191 U/L / x     / 4.0 ng/mL            EKG: Reviewed.    RADIOLOGY & ADDITIONAL TESTS: Reviewed. Patient is an 85 yo F w PMH of ESRD on HD (MWF), nephrosclerosis, HTN, gout, COPD, OA, who presented to the ED with symptoms of lightheadedness for 3 days and SOB for one day. Pt says lightheadedness started 3 days ago after last dialysis session 8/9. Symptoms have been constant and worsened with any movement especially standing from sitting position. Pt says she was unable to go to dialysis yesterday due to lightheadedness and this morning developed symptoms of SOB prompting her to come into ED today. Assoicated symptoms include chronic nonproductive cough, RAMIREZ, orthopnea, LE edema. Pt denies CP, Abdominal pain, dysuria, nausea/vomtiing, fevers/chills. Denies sick contacts or recnt travel.   Of note, pt presented with similar complaints Jan 2019 and found to have junctional bradycardia; admitted to CCU. Symptoms resolved after dialysis and pt discharged after 2 days.    In ED VS: T97.5, HR 46, /79->230/80, RR 19 and SpO2 94% on RA. Labs s/f hgb 10.9, Na 119, K hemolyzedx2 (likely >8), Agap 25. BUN/Cr 91/9.30.     ROS:  Otherwise negative, except as specified in HPI.    PMH: ESRD on HD (MWF), nephrosclerosis, HTN, gout, COPD, OA    PSH: s/p LAVF    FH: CAD (mother)    SH: Denies smoking, alcohol use/ilicit drug use     ALLERGIES: NKDA    MEDICATIONS: Carvedilol 12.5mg BID, unsure of other home meds.     VITAL SIGNS:  ICU Vital Signs Last 24 Hrs  T(C): 36.4 (13 Aug 2019 08:38), Max: 36.4 (13 Aug 2019 08:38)  T(F): 97.5 (13 Aug 2019 08:38), Max: 97.5 (13 Aug 2019 08:38)  HR: 46 (13 Aug 2019 08:38) (46 - 46)  BP: 123/79 (13 Aug 2019 08:38) (123/79 - 123/79)  BP(mean): --  ABP: --  ABP(mean): --  RR: 19 (13 Aug 2019 08:38) (19 - 19)  SpO2: 94% (13 Aug 2019 08:38) (94% - 94%)    CAPILLARY BLOOD GLUCOSE      POCT Blood Glucose.: 271 mg/dL (13 Aug 2019 09:58)      PHYSICAL EXAM:  Constitutional: resting comfortably in bed, in NAD  HEENT: NC/AT; PERRL, anicteric sclera; no oropharyngeal erythema or exudates; MMM  Neck: supple, no appreciable JVD  Respiratory: Wheezing b/l with bibasilar crackles   Cardiovascular: +S1/S2, RRR  Gastrointestinal: abdomen soft, NT/ND  Extremities: 2+ edema bilaterally   Neurological: AO x3, CN intact, neg finger to nose, muscle strength 5/5 b/l, sensation intact b/l.     LABS:                        10.9   8.42  )-----------( 209      ( 13 Aug 2019 09:08 )             32.7     08-13    119<LL>  |  78<L>  |  91<H>  ----------------------------<  145<H>  see note   |  16<L>  |  9.30<H>    Ca    9.0      13 Aug 2019 09:08  Mg     3.0     08-13    TPro  8.0  /  Alb  4.1  /  TBili  0.8  /  DBili  x   /  AST  see note  /  ALT  see note  /  AlkPhos  see note  08-13    PT/INR - ( 13 Aug 2019 09:08 )   PT: 13.9 sec;   INR: 1.22          PTT - ( 13 Aug 2019 09:08 )  PTT:35.9 sec    CARDIAC MARKERS ( 13 Aug 2019 09:08 )  x     / 0.02 ng/mL / 191 U/L / x     / 4.0 ng/mL            EKG: Reviewed.    RADIOLOGY & ADDITIONAL TESTS: Reviewed. Patient is an 83 yo F w PMH of ESRD on HD (MWF), nephrosclerosis, HTN, gout, COPD, OA, who presented to the ED with symptoms of lightheadedness for 3 days and SOB for one day. Pt says lightheadedness started 3 days ago after last dialysis session 8/9. Symptoms have been constant and worsened with any movement especially standing from sitting position. Pt says she was unable to go to dialysis yesterday due to lightheadedness and this morning developed symptoms of SOB prompting her to come into ED today. Assoicated symptoms include chronic nonproductive cough, RAMIREZ, orthopnea, LE edema. Pt denies CP, Abdominal pain, dysuria, nausea/vomtiing, fevers/chills. Denies sick contacts or recnt travel.   Of note, pt presented with similar complaints Jan 2019 and found to have junctional bradycardia; admitted to CCU. Symptoms resolved after dialysis and pt discharged after 2 days.    In ED VS: T97.5, HR 46, /79->230/80, RR 19 and SpO2 94% on RA. Labs s/f hgb 10.9, Na 119, K hemolyzedx2 (likely >8), Agap 25. BUN/Cr 91/9.30. EKG w/ junctional bradycardia. Cardiology and nephrology consulted in ED. Pt given calcium gluconate x2, furosemide 60mg IVP x1, Reg insulin 10U x1 w/1amp dextrose, Bicarb 1amp, duonebs x2 and hydralazine 10mg IVP x1. Pt admitted to MICU for emergent dialysis.     ROS:  Otherwise negative, except as specified in HPI.    PMH: ESRD on HD (MWF), nephrosclerosis, HTN, gout, COPD, OA    PSH: s/p LAVF    FH: CAD (mother)    SH: Denies smoking, alcohol use/ilicit drug use     ALLERGIES: NKDA    MEDICATIONS: Carvedilol 12.5mg BID, unsure of other home meds.     VITAL SIGNS:  ICU Vital Signs Last 24 Hrs  T(C): 36.4 (13 Aug 2019 08:38), Max: 36.4 (13 Aug 2019 08:38)  T(F): 97.5 (13 Aug 2019 08:38), Max: 97.5 (13 Aug 2019 08:38)  HR: 46 (13 Aug 2019 08:38) (46 - 46)  BP: 123/79 (13 Aug 2019 08:38) (123/79 - 123/79)  BP(mean): --  ABP: --  ABP(mean): --  RR: 19 (13 Aug 2019 08:38) (19 - 19)  SpO2: 94% (13 Aug 2019 08:38) (94% - 94%)    CAPILLARY BLOOD GLUCOSE      POCT Blood Glucose.: 271 mg/dL (13 Aug 2019 09:58)      PHYSICAL EXAM:  Constitutional: resting comfortably in bed, in NAD  HEENT: NC/AT; PERRL, anicteric sclera; no oropharyngeal erythema or exudates; MMM  Neck: supple, no appreciable JVD  Respiratory: Wheezing b/l with bibasilar crackles   Cardiovascular: +S1/S2, RRR  Gastrointestinal: abdomen soft, NT/ND  Extremities: 2+ edema bilaterally   Neurological: AO x3, CN intact, neg finger to nose, muscle strength 5/5 b/l, sensation intact b/l.     LABS:                        10.9   8.42  )-----------( 209      ( 13 Aug 2019 09:08 )             32.7     08-13    119<LL>  |  78<L>  |  91<H>  ----------------------------<  145<H>  see note   |  16<L>  |  9.30<H>    Ca    9.0      13 Aug 2019 09:08  Mg     3.0     08-13    TPro  8.0  /  Alb  4.1  /  TBili  0.8  /  DBili  x   /  AST  see note  /  ALT  see note  /  AlkPhos  see note  08-13    PT/INR - ( 13 Aug 2019 09:08 )   PT: 13.9 sec;   INR: 1.22          PTT - ( 13 Aug 2019 09:08 )  PTT:35.9 sec    CARDIAC MARKERS ( 13 Aug 2019 09:08 )  x     / 0.02 ng/mL / 191 U/L / x     / 4.0 ng/mL            EKG: Reviewed.    RADIOLOGY & ADDITIONAL TESTS: Reviewed.

## 2019-08-13 NOTE — H&P ADULT - ASSESSMENT
RENAL and ELECTROLYTES  #ESRD on HD: patient had missed dialysis session yesterday and is requiring emergent dialysis in the setting of hypertensive urgency and hyperkalemia  -emergent dialysis in ICU  -Nephrology consulted  #Hyperkalemia: 2/2 missed hemodialysis session, now with junctional rhythm  -emergent dialysis in ICU  -already received calcium gluconate, insulin  #Hyponatremia: 2/2 hypervolemic hyponatremia in setting of missed dialysis. Currently asymptomatic hyponatremia  -emergent dialysis in ICU  -minimize acute change in sodium to <8 in 24h  -repeat BMP with dialysis     NEURO  #Currently alert, oriented, and following commands    CARDIOVASCULAR  #Hypertensive Emergency: 2/2 missed hemodialysis session  -emergent dialysis in ICU  -will reassess after dialysis and determine need for antihypertensives  #Bradycardia: EKG showing junctional rhythm  -Cardiology consulted: recommend TTE  -pacer pads on patient     PULMONARY  #    GASTROINTESTINAL  #No active issues    INFECTIOUS DISEASE  #No active issues or suspicion of infectious process    ENDOCRINE  #    HEME  #    FLUIDS/ELECTROLYTES/NUTRITION  -None  -Monitor, cautious repletion as ESRD  -Renal diet  PROPHYLAXIS  -SQH  -Protonix    DISPO: MICU  FULL CODE RENAL and ELECTROLYTES  #ESRD on HD: patient had missed dialysis session yesterday and is requiring emergent dialysis in the setting of hypertensive urgency and hyperkalemia  -emergent dialysis in ICU  -Nephrology consulted  #Hyperkalemia: 2/2 missed hemodialysis session, now with junctional rhythm  -emergent dialysis in ICU  -already received calcium gluconate, insulin and dextrose  #Hyponatremia: 2/2 hypervolemic hyponatremia in setting of missed dialysis. Currently asymptomatic hyponatremia  -emergent dialysis in ICU  -minimize acute change in sodium to <8 in 24h  -repeat BMP with dialysis     NEURO  #Currently alert, oriented, and following commands    CARDIOVASCULAR  #Hypertensive Emergency: 2/2 missed hemodialysis session  -emergent dialysis in ICU  -will reassess after dialysis and determine need for antihypertensives  #Bradycardia: EKG showing junctional rhythm  -Cardiology consulted: recommend TTE  -pacer pads on patient     PULMONARY  #    GASTROINTESTINAL  #No active issues    INFECTIOUS DISEASE  #No active issues or suspicion of infectious process    ENDOCRINE  #    HEME  #    FLUIDS/ELECTROLYTES/NUTRITION  -None  -Monitor, cautious repletion as ESRD  -Renal diet  PROPHYLAXIS  -SQH  -Protonix    DISPO: MICU  FULL CODE RENAL and ELECTROLYTES  #ESRD on HD: patient had missed dialysis session yesterday and is requiring emergent dialysis in the setting of hypertensive urgency and hyperkalemia  -emergent dialysis in ICU  -Nephrology consulted  #Hyperkalemia: 2/2 missed hemodialysis session, now with junctional rhythm  -emergent dialysis in ICU  -already received calcium gluconate, insulin and dextrose  #Hyponatremia: 2/2 hypervolemic hyponatremia in setting of missed dialysis. Currently asymptomatic hyponatremia  -emergent dialysis in ICU  -minimize acute change in sodium to <8 in 24h  -repeat BMP with dialysis     NEURO  #Currently alert, oriented, and following commands    CARDIOVASCULAR  #Hypertensive Emergency: 2/2 missed hemodialysis session  -emergent dialysis in ICU  -will reassess after dialysis and determine need for antihypertensives; hold home meds for now  - f/u tte  #Bradycardia: EKG showing junctional rhythm  -Cardiology consulted: recommend TTE  -pacer pads on patient   - hold coreg  #Paroxysmal afib - compliant with eliquis    PULMONARY  #No active issues    GASTROINTESTINAL  #No active issues    INFECTIOUS DISEASE  #No active issues or suspicion of infectious process    ENDOCRINE  #No active issues    HEME  #    FLUIDS/ELECTROLYTES/NUTRITION  -None  -Monitor, cautious repletion as ESRD  -Renal diet  PROPHYLAXIS  -SQH  -Protonix    DISPO: MICU  FULL CODE Ms. Plaza is an 83yo F pmh ESR (dialysis MWF), hld, htn, paroxysmal afib (on eliquis) who presents with acute SOB and dizziness likley 2/2 volume overload in the setting of missed dialysis with pan-electrolyte abnormalities; now in ICU for emergent dialysis    RENAL and ELECTROLYTES  #ESRD on HD: patient had missed dialysis session yesterday and is requiring emergent dialysis in the setting of hypertensive urgency and hyperkalemia  -emergent dialysis in ICU  -Nephrology consulted  #Hyperkalemia: 2/2 missed hemodialysis session, now with junctional rhythm  -emergent dialysis in ICU  -already received calcium gluconate, insulin and dextrose  #Hyponatremia: 2/2 hypervolemic hyponatremia in setting of missed dialysis. Currently asymptomatic hyponatremia  -emergent dialysis in ICU  -minimize acute change in sodium to <8 in 24h  -repeat BMP with dialysis     NEURO  #Currently alert, oriented, and following commands  #No active problems    CARDIOVASCULAR  #Hypertensive Emergency: 2/2 missed hemodialysis session  -emergent dialysis in ICU  -will reassess after dialysis and determine need for antihypertensives; hold home meds for now  - f/u tte  #Bradycardia: EKG showing junctional rhythm  -Cardiology consulted: recommend TTE  -pacer pads on patient   - hold coreg  #Paroxysmal afib - compliant with eliquis  - restart eliquis 2.5 bid    PULMONARY  #No active issues    GASTROINTESTINAL  #No active issues    INFECTIOUS DISEASE  #No active issues or suspicion of infectious process    ENDOCRINE  #No active issues    FLUIDS/ELECTROLYTES/NUTRITION  -None  -Monitor, cautious repletion as ESRD  -Renal diet  PROPHYLAXIS  -Eliquis  -Protonix    DISPO: MICU  FULL CODE

## 2019-08-13 NOTE — CONSULT NOTE ADULT - ASSESSMENT
83F w PMH of ESRD on HD (MWF), nephrosclerosis, HTN, gout, COPD, OA, who presents with 3 days of dizziness and 1 day of SOB after missing dialysis; found to have severe electrolyte abnormalities and bradycardia     #Bradycardia: Pt with symptoms of dizziness since last dialysis session 3 days ago (8/9 83F w PMH of ESRD on HD (MWF), nephrosclerosis, HTN, gout, COPD, OA, who presents with 3 days of dizziness and 1 day of SOB after missing dialysis; found to have severe electrolyte abnormalities and bradycardia     #Bradycardia: Pt with symptoms of dizziness since last dialysis session 3 days ago (8/9) and found to have bradycardia to 40s. EKG appears junctional bradycardia per CCU fellow. Of note pt admitted to CCU in Jan 2019 for similar reasons and bradycardia resolved after dialysis.   - currently asymptomatic in bed and HDS  - TCP in place and atropine at bedside   - recommend admission to CCU to monitor need for TVP  - urgent hemodialysis   - serial EKGs    #Hyperkalemia: Pt with potassium of... likely 2/2 ESRD and missied dialysis. EKG with junctional bradycardia.   - s/p calcium gluconate x2, furosemide 60mg IVP x1, Reg insulin 10U x1 w/1amp dextrose and dounebs x2.   - trend bmp  - bradycardia management above   - urgent dialysis     #Hyponatremia: Pt with Na of 119 on admission. Likely hypervolemic hyponatremia 2/2 ESRD and missed dialysis session. Pt with symptoms of dizziness for 3 days however currently asymptomatic No neurologic defecits on exam.   - urgent dialysis   - trend BMP  -     #ESRD on dialysis(MWF): Pt with hx of ESRD, lasts session 8/9; missed dialysis Monday due to symptoms of dizziness. Pt with symptoms of SOB for one day however comfortable on exam and saturating well on RA. CXR with b/l congestion. Labs s/f severe hyponatremia, hyperkalemia, hypochloremia, elevated anion gap 2/2 ESRD and missed dialysis  - urgent dialysis  - nephrology on board  - c/w home renvela, calcitriol. 83F w PMH of ESRD on HD (MWF), nephrosclerosis, HTN, gout, COPD, OA, who presents with 3 days of dizziness and 1 day of SOB after missing dialysis; found to have severe electrolyte abnormalities and bradycardia     #Bradycardia: Pt with symptoms of dizziness since last dialysis session 3 days ago (8/9) and found to have bradycardia to 40s. EKG appears junctional bradycardia per CCU fellow. Of note pt admitted to CCU in Jan 2019 for similar reasons and bradycardia resolved after dialysis.   - currently asymptomatic in bed and HDS  - TCP in place and atropine at bedside   - urgent hemodialysis   - serial EKGs    #Hyperkalemia: Pt with potassium of... likely 2/2 ESRD and missied dialysis. EKG with junctional bradycardia.   - s/p calcium gluconate x2, furosemide 60mg IVP x1, Reg insulin 10U x1 w/1amp dextrose and dounebs x2.   - trend bmp  - bradycardia management above   - urgent dialysis     #Hyponatremia: Pt with Na of 119 on admission. Likely hypervolemic hyponatremia 2/2 ESRD and missed dialysis session. Pt with symptoms of dizziness for 3 days however currently asymptomatic No neurologic defecits on exam.   - urgent dialysis   - trend BMP  -     #ESRD on dialysis(MWF): Pt with hx of ESRD, lasts session 8/9; missed dialysis Monday due to symptoms of dizziness. Pt with symptoms of SOB for one day however comfortable on exam and saturating well on RA. CXR with b/l congestion. Labs s/f severe hyponatremia, hyperkalemia, hypochloremia, elevated anion gap 2/2 ESRD and missed dialysis  - urgent dialysis  - nephrology on board  - c/w home renvela, calcitriol. 83F w PMH of ESRD on HD (MWF), nephrosclerosis, HTN, gout, COPD, OA, who presents with 3 days of dizziness and 1 day of SOB after missing dialysis; found to have severe electrolyte abnormalities and bradycardia     #Bradycardia: Pt with symptoms of dizziness since last dialysis session 3 days ago (8/9) and found to have bradycardia to 40s. EKG appears junctional bradycardia per CCU fellow. Of note pt admitted to CCU in Jan 2019 for similar reasons and bradycardia resolved after dialysis.   - currently asymptomatic in bed and HDS  - TCP in place and atropine at bedside   - urgent hemodialysis   - serial EKGs  - tele monitoring     #Hyperkalemia: Pt with hemolyzed potassium x2 in ED but likely hyperkalemic 2/2 ESRD and missied dialysis. EKG with junctional bradycardia.   - s/p calcium gluconate x2, furosemide 60mg IVP x1, Reg insulin 10U x1 w/1amp dextrose and dounebs x2. in ED  - 1 amp Bicarb  - trend bmp  - bradycardia management above   - urgent dialysis   - f/up nephro recs     #Hyponatremia: Pt with Na of 119 on admission. Likely hypervolemic hyponatremia 2/2 ESRD and missed dialysis session. Pt with symptoms of dizziness for 3 days however currently asymptomatic No neurologic defecits on exam.   - urgent dialysis   - trend BMP  - close monitoring of Na to prevent overcorrection.   - nephro on board, f/up recs.     #ESRD on dialysis(MWF): Pt with hx of ESRD, lasts session 8/9; missed dialysis Monday due to symptoms of dizziness. Pt with symptoms of SOB for one day however comfortable on exam and saturating well on RA. CXR with b/l congestion. Labs s/f severe hyponatremia, hyperkalemia, hypochloremia, elevated anion gap 2/2 ESRD and missed dialysis  - urgent dialysis  - nephrology on board, f/up recs.   - c/w home renvela, calcitriol.       Dispo: [facility]

## 2019-08-13 NOTE — PROGRESS NOTE ADULT - ASSESSMENT
Pt is an 85 yo F with pmh ESRD (dialysis MWF), hld, htn, COPD, paroxysmal afib (on eliquis, reports compliance) who presented to the ED with dizziness x1d. Pt skipped last HD session as she was not feeling well. In ED pt bradycardic with HR 41. Labs pertinent for K 6.1, Na 119. Received emergent dialysis in ED admitted to ICU for further monitoring and now stepped down to 7L as labs have begun to normalize.    RENAL and ELECTROLYTES  #ESRD on HD: patient had missed dialysis session yesterday and is now s/p emergent dialysis in the setting of hypertensive urgency and hyperkalemia  -Nephrology consulted    #Hyperkalemia: 2/2 missed hemodialysis session  -Resolved  -s/p emergent dialysis, calcium gluconate x2, insulin and dextrose, albuterol nebs  -7p K 4.2    #Hyponatremia: 2/2 hypervolemic hyponatremia in setting of missed dialysis  -s/p emergent dialysis  -minimize acute change in sodium to <8 in 24h  -repeat BMP with inc Na to 128  - Started on D5 40 cc/h will repeat BMP 12am    NEURO  #Currently alert, oriented, and following commands  #No active problems    CARDIOVASCULAR  #Hypertensive Emergency: 2/2 missed hemodialysis session  -s/p emergent dialysis  -BP normotensive at present, hold home meds for now  - f/u TTE    #Bradycardia:   -Cardiology consulted: recommend TTE  -Resolved, current HR 70's   -hold coreg    #Paroxysmal afib - compliant with eliquis  - restart eliquis 2.5 bid    PULMONARY  #No active issues    GASTROINTESTINAL  #No active issues    INFECTIOUS DISEASE  #No active issues or suspicion of infectious process    ENDOCRINE  #No active issues    FLUIDS/ELECTROLYTES/NUTRITION  -D5 40 cc/h  -Monitor, cautious repletion as ESRD  -Renal diet  PROPHYLAXIS  -Eliquis  -Protonix    DISPO: 7L  FULL CODE Pt is an 85 yo F with pmh ESRD (dialysis MWF), hld, htn, COPD, paroxysmal afib (on eliquis, reports compliance) who presented to the ED with dizziness x1d. Pt skipped last HD session as she was not feeling well. In ED pt bradycardic with HR 41. Labs pertinent for K 6.1, Na 119. Received emergent dialysis in ED admitted to ICU for further monitoring and now stepped down to 7L as labs have begun to normalize.    RENAL and ELECTROLYTES  #ESRD on HD: patient had missed dialysis session yesterday and is now s/p emergent dialysis in the setting of hypertensive urgency and hyperkalemia  -Nephrology consulted  - S/p 2.5L removed via HD 8/13   -Avoid nephrotoxic medications  - Renally dose medications     #Hyperkalemia: 2/2 missed hemodialysis session  -Resolved  -s/p emergent dialysis, calcium gluconate x2, insulin and dextrose, albuterol nebs  -7p K 4.2  - F/u BMPqd closely     #Hyponatremia: 2/2 hypervolemic hyponatremia in setting of missed dialysis. Mentating at baseline throughout hospital course.   -s/p emergent dialysis  -minimize acute change in sodium to <8 in 24h  -repeat BMP with inc Na to 128  - Started on D5 40 cc/h will repeat BMP 12am    NEURO  #Currently alert, oriented, and following commands  #No active problems    CARDIOVASCULAR  #Hypertensive Emergency: 2/2 missed hemodialysis session  -s/p emergent dialysis  -BP normotensive at present, hold home meds for now  - f/u TTE     #Junctional Bradycardia:   -Cardiology consulted: recommend TTE  -Resolved, current HR 70's   -hold coreg  - EP recommending Loop recorder before discharge     #Paroxysmal afib - compliant with eliquis  - AC: restarted eliquis 2.5 bid  - Rate: rate controlled with episodes of bradycardia, holding home coreg for now     PULMONARY  #No active issues    GASTROINTESTINAL  #No active issues    INFECTIOUS DISEASE  #No active issues or suspicion of infectious process    ENDOCRINE  #No active issues    FLUIDS/ELECTROLYTES/NUTRITION  -D5 40 cc/h  -Monitor, cautious repletion as ESRD  -Renal diet  PROPHYLAXIS  -Eliquis  -Protonix    DISPO: 7L  FULL CODE

## 2019-08-13 NOTE — CONSULT NOTE ADULT - ASSESSMENT
84 year old woman, w/ a PMHx of ESRD (on HD M/W/F), HLD, and HTN presenting to ED with complaints of SOB x1 day s/p missed HD; Cardiology service consulted for bradycardia, and patient w/ HR of 41 on telemetry monitor at the time of examination.    #Bradycardia  Likely 2/2 electrolyte abnormalities in the setting of missed HD.  - apply pacer pads for possible transcutaneous pacemaking if mental status/hemodynamics alter  - continue telemetry monitoring  - transient reversal of hyperK per primary team ongoing  - f/u Nephrology consult for emergent HD  - f/u ICU evaluation    Case to be discussed with attending; recommendations are not final until attested by attending. 84 year old woman, w/ a PMHx of ESRD (on HD M/W/F), HLD, and HTN presenting to ED with complaints of SOB x1 day s/p missed HD; Cardiology service consulted for bradycardia, and patient w/ HR of 41 on telemetry monitor at the time of examination.    #Bradycardia  Likely 2/2 electrolyte abnormalities in the setting of missed HD.  - apply pacer pads for possible transcutaneous pacemaking if mental status/hemodynamics alter  - continue telemetry monitoring  - transient reversal of hyperK per primary team ongoing  - f/u Nephrology consult for emergent HD  - f/u ICU evaluation  - obtain TTE    Case to be discussed with attending; recommendations are not final until attested by attending. 84 year old woman, w/ a PMHx of ESRD (on HD M/W/F), HLD, and HTN presenting to ED with complaints of SOB x1 day s/p missed HD; Cardiology service consulted for bradycardia, and patient w/ HR of 41 on telemetry monitor at the time of examination.    #Bradycardia  Likely 2/2 electrolyte abnormalities in the setting of missed HD.  - apply pacer pads for possible transcutaneous pacemaking if mental status/hemodynamics alter  - continue telemetry monitoring  - transient reversal of hyperK per primary team ongoing  - f/u Nephrology consult for emergent HD  - f/u ICU evaluation  - obtain TTE  - patient is now in rate controlled atrial fibrillation she had atrial flutter in the past has likely underlying conduction issues continue to monitor as electrolytes are corrected will need long term anticoagulation for afib flutter I also wonder if her dizziness which she makes her miss her HD is due to underlying conduction issues. will discuss with EPS.     Case to be discussed with attending; recommendations are not final until attested by attending. 84 year old woman, w/ a PMHx of ESRD (on HD M/W/F), HLD, and HTN presenting to ED with complaints of SOB x1 day s/p missed HD; Cardiology service consulted for bradycardia, and patient w/ HR of 41 on telemetry monitor at the time of examination.    #Bradycardia  Likely 2/2 electrolyte abnormalities in the setting of missed HD.  - apply pacer pads for possible transcutaneous pacemaking if mental status/hemodynamics alter  - continue telemetry monitoring  - transient reversal of hyperK per primary team ongoing  - f/u Nephrology consult for emergent HD  - f/u ICU evaluation  - obtain TTE  - patient is now in rate controlled atrial fibrillation she had atrial flutter in the past has likely underlying conduction issues continue to monitor as electrolytes are corrected will need long term anticoagulation for afib flutter I also wonder if her dizziness which she makes her miss her HD is due to underlying conduction issues. will discuss with EPS regarding loop recorder.    Case to be discussed with attending; recommendations are not final until attested by attending.

## 2019-08-13 NOTE — H&P ADULT - HISTORY OF PRESENT ILLNESS
Ms. Plaza is a 85yo F with pmh ESRD (dialysis MWF), hld, htn who presented to the ED with SOB and dizziness for one day. SOB worse with walking and associated with chest tightness without radiation. Pt is on dialysis for 5 months (followed by Kir[facility] Cervantes), last dialyzed on Friday. Patient skipped her Monday session because she was not feeling well. In ED was bradycardic with HR of 41. K 6.1, Na 119. Received emergent dialysis in ED.    On eliquis. Ms. Plaza is a 85yo F with pmh ESRD (dialysis MWF), hld, htn, COPD, paroxysmal afib (on eliquis, reports compliance) who presented to the ED with dizziness for one day. Pt reports that she feels dizzy about twice a day, sometimes when standing abruptly or when walking. One day ago she also had SOB. Pt has been on dialysis for 5 months (followed by Kir[facility] Cervantes), last dialyzed on Friday. Patient skipped her Monday session because of feeling dizzy  was not feeling well. In ED was bradycardic with HR of 41. K 6.1, Na 119. Received emergent dialysis in ED. Patient has had prior admission for same scenario in Jan.    Denies chest pain, fevers, abdominal pain, dyuria/hematuria, bowel constipation/incontinence.

## 2019-08-13 NOTE — CONSULT NOTE ADULT - SUBJECTIVE AND OBJECTIVE BOX
HPI: 84 year old female with ESRD, HTN, gout, COPD, who came through the ER this am with SOB and lightheadedness in the setting of missed dialysis found to have junctional bradycardia on EKG.      She complained of lightheadedness after her last dialysis session 3 days ago; mostly positional from sitting to standing.  She missed dialysis yesterday and became SOB today.  She came to the ER  Electrolytes abnormal and she is being treated for hyperkalemia.  No syncope or palpitations.      Of note, pt presented with similar complaints Jan 2019 and found to have junctional bradycardia; admitted to CCU. Symptoms resolved after dialysis and pt discharged after 2 days.    I  PAST MEDICAL & SURGICAL HISTORY:  Gout  HLD (hyperlipidemia)  HTN (hypertension)  CKD (chronic kidney disease)  AV fistula: LUE      No pertinent family history in first degree relatives      Social History: no drugs     home medications:  · 	Coreg 6.25 mg oral tablet: 1 tab(s) orally 2 times a day   · 	losartan 50 mg oral tablet: 1 tab(s) orally 2 times a day   · 	apixaban 2.5 mg oral tablet: 1 tab(s) orally every 12 hours   · 	multivitamin: 1 tab(s) orally once a day  · 	folic acid 1 mg oral tablet: 1 tab(s) orally once a day  · 	Emla 2.5%-2.5% topical cream: Apply topically to affected area Monday, Wednesday, and Friday  · 	D3 1000 oral tablet: 2 tab(s) orally once a day  · 	Mircera 50 mcg/0.3 mL injectable solution: 1 each injectable once a month  · 	Venofer 20 mg/mL intravenous solution: 1 each intravenous prn  · 	calcitriol 0.25 mcg oral capsule: 1 cap(s) orally Monday, Wednesday, and Friday  · 	Renvela 800 mg oral tablet: 1 tab(s) orally 3 times a day (with meals)  · 	simvastatin 20 mg oral tablet: 1 tab(s) orally once a day (at bedtime)    Inpatient Medications:   chlorhexidine 2% Cloths 1 Application(s) Topical <User Schedule>  heparin  Injectable 5000 Unit(s) SubCutaneous every 8 hours      Allergies:    Norvasc (Other)      ROS:   CONSTITUTIONAL: No fever, weight loss + fatigue  EYES: Pt denies  RESPIRATORY: +Shortness of Breath  CARDIOVASCULAR: see HPI  GASTROINTESTINAL: Pt denies  NEUROLOGICAL: Pt denies  SKIN: Pt denies   PSYCHIATRIC: Pt denies  HEME/LYMPH: Pt denies    PHYSICAL:  T(C): 35.9 (08-13-19 @ 11:19), Max: 36.4 (08-13-19 @ 08:38)  HR: 40 (08-13-19 @ 11:30) (35 - 60)  BP: 186/59 (08-13-19 @ 11:30) (123/79 - 240/81)  RR: 24 (08-13-19 @ 11:30) (19 - 31)  SpO2: 100% (08-13-19 @ 11:30) (94% - 100%)     Appearance: on a non rebreather  Eyes: normal appearing conjunctiva, pupils and eyelids  Cardiovascular: bradycardia  Respiratory: Lungs clear to auscultation	   Gastrointestinal:  Soft, NT/ND 	  Neurologic:  No deficit noted  Psych: A&Ox3  Musculoskeletal: in bed no deformities noted  Skin:   normal color and pigmentation.        LABS:                        10.9   8.42  )-----------( 209      ( 13 Aug 2019 09:08 )             32.7     116<LL>  |  76<L>  |  93<H>  ----------------------------<  279<H>  see note   |  18<L>  |  9.15<H>    Ca    9.7      13 Aug 2019 09:50  Mg     3.0     08-13    TPro  7.9  /  Alb  4.1  /  TBili  0.7  /  DBili  x   /  AST  see note  /  ALT  see note  /  AlkPhos  see note  08-13    PT/INR - ( 13 Aug 2019 09:08 )   PT: 13.9 sec;   INR: 1.22          PTT - ( 13 Aug 2019 09:08 )  PTT:35.9 sec       EKG: shows junctional bradycardia with likely a retrograde P wave that conducts in the setting of hyperkalemia    Telemetry: not on tele - being moved to ICU    ECHO:  last 2017- EF 60%         Assessment Plan:  84 year old female with ESRD, HTN, gout, COPD, who came through the ER this am with SOB and lightheadedness in the setting of missed dialysis found to have junctional bradycardia on EKG.  junctional bradycardia likely in the setting of hyperkalemia secondary to missed dialysis.  She is being moved to [facility] ICU for emergent dialysis.  She has pacer pads on.  If bradycardia persists despite electrolyte issues being fixed she will need a TVP - however this resolved post dialysis in her prior admission.  Will continue to follow with you HPI: 84 year old female with ESRD, HTN, gout, COPD, who came through the ER this am with SOB and lightheadedness in the setting of missed dialysis found to have junctional bradycardia on EKG.      She complained of lightheadedness after her last dialysis session 3 days ago; mostly positional from sitting to standing.  She missed dialysis yesterday and became SOB today.  She came to the ER  Electrolytes abnormal and she is being treated for hyperkalemia.  No syncope or palpitations.      Of note, pt presented with similar complaints Jan 2019 and found to have junctional bradycardia; admitted to CCU. Symptoms resolved after dialysis and pt discharged after 2 days.    I  PAST MEDICAL & SURGICAL HISTORY:  Gout  HLD (hyperlipidemia)  HTN (hypertension)  CKD (chronic kidney disease)  AV fistula: LUE      No pertinent family history in first degree relatives      Social History: no drugs     home medications:  · 	Coreg 6.25 mg oral tablet: 1 tab(s) orally 2 times a day   · 	losartan 50 mg oral tablet: 1 tab(s) orally 2 times a day   · 	apixaban 2.5 mg oral tablet: 1 tab(s) orally every 12 hours   · 	multivitamin: 1 tab(s) orally once a day  · 	folic acid 1 mg oral tablet: 1 tab(s) orally once a day  · 	Emla 2.5%-2.5% topical cream: Apply topically to affected area Monday, Wednesday, and Friday  · 	D3 1000 oral tablet: 2 tab(s) orally once a day  · 	Mircera 50 mcg/0.3 mL injectable solution: 1 each injectable once a month  · 	Venofer 20 mg/mL intravenous solution: 1 each intravenous prn  · 	calcitriol 0.25 mcg oral capsule: 1 cap(s) orally Monday, Wednesday, and Friday  · 	Renvela 800 mg oral tablet: 1 tab(s) orally 3 times a day (with meals)  · 	simvastatin 20 mg oral tablet: 1 tab(s) orally once a day (at bedtime)    Inpatient Medications:   chlorhexidine 2% Cloths 1 Application(s) Topical <User Schedule>  heparin  Injectable 5000 Unit(s) SubCutaneous every 8 hours      Allergies:    Norvasc (Other)      ROS:   CONSTITUTIONAL: No fever, weight loss + fatigue  EYES: Pt denies  RESPIRATORY: +Shortness of Breath  CARDIOVASCULAR: see HPI  GASTROINTESTINAL: Pt denies  NEUROLOGICAL: Pt denies  SKIN: Pt denies   PSYCHIATRIC: Pt denies  HEME/LYMPH: Pt denies    PHYSICAL:  T(C): 35.9 (08-13-19 @ 11:19), Max: 36.4 (08-13-19 @ 08:38)  HR: 40 (08-13-19 @ 11:30) (35 - 60)  BP: 186/59 (08-13-19 @ 11:30) (123/79 - 240/81)  RR: 24 (08-13-19 @ 11:30) (19 - 31)  SpO2: 100% (08-13-19 @ 11:30) (94% - 100%)     Appearance: on a non rebreather  Eyes: normal appearing conjunctiva, pupils and eyelids  Cardiovascular: bradycardia  Respiratory: Lungs clear to auscultation	   Gastrointestinal:  Soft, NT/ND 	  Neurologic:  No deficit noted  Psych: A&Ox3  Musculoskeletal: in bed no deformities noted  Skin:   normal color and pigmentation.        LABS:                        10.9   8.42  )-----------( 209      ( 13 Aug 2019 09:08 )             32.7     116<LL>  |  76<L>  |  93<H>  ----------------------------<  279<H>  see note   |  18<L>  |  9.15<H>    Ca    9.7      13 Aug 2019 09:50  Mg     3.0     08-13    TPro  7.9  /  Alb  4.1  /  TBili  0.7  /  DBili  x   /  AST  see note  /  ALT  see note  /  AlkPhos  see note  08-13    PT/INR - ( 13 Aug 2019 09:08 )   PT: 13.9 sec;   INR: 1.22          PTT - ( 13 Aug 2019 09:08 )  PTT:35.9 sec       EKG: shows junctional bradycardia with likely a retrograde P wave that conducts in the setting of hyperkalemia    Telemetry: not on tele - being moved to ICU    ECHO:  last 2017- EF 60%         Assessment Plan:  84 year old female with ESRD, HTN, gout, COPD, who came through the ER this am with SOB and lightheadedness in the setting of missed dialysis found to have junctional bradycardia on EKG.  junctional bradycardia likely in the setting of hyperkalemia secondary to missed dialysis.  She is being moved to [facility] ICU for emergent dialysis.  She has pacer pads on.  If bradycardia persists despite electrolyte issues being fixed she will need a TVP - however this resolved post dialysis in her prior admission.  Will continue to follow with you    addendum  - while in ICU went into afib with a 3 second conversion pause - will recommend ILR prior to discharge from the hospital (can be done day before or morning of discharge)

## 2019-08-13 NOTE — CONSULT NOTE ADULT - ATTENDING COMMENTS
2 missed HD   junctional bradycardia -- unclear if hyperkalemic or underlying cardiac cause   severe hyponatremia  plan urgent HD (on monitor)-- low K bath (pending  recheck chem)  gentle rx with low Na bath to avoid overcorrection   will need to monitor na after for overcorrection -- goal na 126 today
please see h/P for detail

## 2019-08-13 NOTE — CONSULT NOTE ADULT - ASSESSMENT
84 yo F w PMHx of ESRD on HD (MWF), nephrosclerosis, HTN, gout, COPD, OA, who presents with 3 days of dizziness and 1 day of SOB after missing dialysis on 8/9 and 8/12; found to have severe electrolyte abnormalities and severe bradycardia.    Nephrology consult placed in regards to ESRD on HD, for hyperkalemia requiring urgent hemodialysis, hyponatremia.    # ESRD on HD MWF via LUE AVF  - missed HD on 8/9 and 8/12  - getting urgent HD today b/o symptomatic hyperkalemia, hyponatremia  - last HD on 8/7  - obtain flow sheets from outpatient unit  - daily weights  - renal diet  - strict I/O    # Hyperkalemia  - Rx received in ED: calcium gluconate, sodium bicarb, insulin with dextrose, furosemide, albuterol  - EKG: severe junctional bradycardia  - urgent HD with 1K bath  - follow BMP  - telemetry monitoring    # Severe hyponatremia, likely chronic due to hypervolemia  - Na 119  - HD today with optiflux 160, 130 Na bath, low Qb 250 ml/min over 180 min to prevent overcorrection  - monitor BMP Q 3-4/hr    # HTN  - Resume home meds  - UF with HD    # Renal bone disease  - continue renvela, calcitriol  - phos/pth/vit D as outpatient    # Anemia  - Hb 10.9 g/dl  - send iron panel  - transfusion as per primary team

## 2019-08-13 NOTE — ED ADULT NURSE NOTE - OBJECTIVE STATEMENT
85 y/o female w/ hx of CKD, HLD, and HTN presents to ED c/o SOB that began yesterday associated w/ dizziness. Pt reports persistent SOB that is worse w/ movement and improved w/ rest associated w/ chest tightness. Pt reports missing dialysis on Monday bc of SOB. Pt last had dialysis on Friday. Pt upgraded to [facility] for bradycardia and abnormal EKG. Denies any other sx.

## 2019-08-13 NOTE — H&P ADULT - NSICDXPASTMEDICALHX_GEN_ALL_CORE_FT
PAST MEDICAL HISTORY:  CKD (chronic kidney disease)     ESRD on dialysis     Gout     HLD (hyperlipidemia)     HTN (hypertension)

## 2019-08-14 DIAGNOSIS — R63.8 OTHER SYMPTOMS AND SIGNS CONCERNING FOOD AND FLUID INTAKE: ICD-10-CM

## 2019-08-14 DIAGNOSIS — M10.9 GOUT, UNSPECIFIED: ICD-10-CM

## 2019-08-14 DIAGNOSIS — E78.5 HYPERLIPIDEMIA, UNSPECIFIED: ICD-10-CM

## 2019-08-14 DIAGNOSIS — I10 ESSENTIAL (PRIMARY) HYPERTENSION: ICD-10-CM

## 2019-08-14 DIAGNOSIS — R00.1 BRADYCARDIA, UNSPECIFIED: ICD-10-CM

## 2019-08-14 DIAGNOSIS — Z29.9 ENCOUNTER FOR PROPHYLACTIC MEASURES, UNSPECIFIED: ICD-10-CM

## 2019-08-14 DIAGNOSIS — E87.1 HYPO-OSMOLALITY AND HYPONATREMIA: ICD-10-CM

## 2019-08-14 DIAGNOSIS — N18.6 END STAGE RENAL DISEASE: ICD-10-CM

## 2019-08-14 DIAGNOSIS — E11.22 TYPE 2 DIABETES MELLITUS WITH DIABETIC CHRONIC KIDNEY DISEASE: ICD-10-CM

## 2019-08-14 DIAGNOSIS — I48.2 CHRONIC ATRIAL FIBRILLATION: ICD-10-CM

## 2019-08-14 DIAGNOSIS — Z91.89 OTHER SPECIFIED PERSONAL RISK FACTORS, NOT ELSEWHERE CLASSIFIED: ICD-10-CM

## 2019-08-14 LAB
ANION GAP SERPL CALC-SCNC: 15 MMOL/L — SIGNIFICANT CHANGE UP (ref 5–17)
ANION GAP SERPL CALC-SCNC: 17 MMOL/L — SIGNIFICANT CHANGE UP (ref 5–17)
ANION GAP SERPL CALC-SCNC: 19 MMOL/L — HIGH (ref 5–17)
BASOPHILS # BLD AUTO: 0.05 K/UL — SIGNIFICANT CHANGE UP (ref 0–0.2)
BASOPHILS NFR BLD AUTO: 0.7 % — SIGNIFICANT CHANGE UP (ref 0–2)
BUN SERPL-MCNC: 27 MG/DL — HIGH (ref 7–23)
BUN SERPL-MCNC: 62 MG/DL — HIGH (ref 7–23)
BUN SERPL-MCNC: 65 MG/DL — HIGH (ref 7–23)
CALCIUM SERPL-MCNC: 8.5 MG/DL — SIGNIFICANT CHANGE UP (ref 8.4–10.5)
CALCIUM SERPL-MCNC: 8.6 MG/DL — SIGNIFICANT CHANGE UP (ref 8.4–10.5)
CALCIUM SERPL-MCNC: 8.6 MG/DL — SIGNIFICANT CHANGE UP (ref 8.4–10.5)
CHLORIDE SERPL-SCNC: 85 MMOL/L — LOW (ref 96–108)
CHLORIDE SERPL-SCNC: 86 MMOL/L — LOW (ref 96–108)
CHLORIDE SERPL-SCNC: 89 MMOL/L — LOW (ref 96–108)
CO2 SERPL-SCNC: 22 MMOL/L — SIGNIFICANT CHANGE UP (ref 22–31)
CO2 SERPL-SCNC: 24 MMOL/L — SIGNIFICANT CHANGE UP (ref 22–31)
CO2 SERPL-SCNC: 27 MMOL/L — SIGNIFICANT CHANGE UP (ref 22–31)
CREAT SERPL-MCNC: 3.77 MG/DL — HIGH (ref 0.5–1.3)
CREAT SERPL-MCNC: 6.76 MG/DL — HIGH (ref 0.5–1.3)
CREAT SERPL-MCNC: 7.17 MG/DL — HIGH (ref 0.5–1.3)
EOSINOPHIL # BLD AUTO: 0.16 K/UL — SIGNIFICANT CHANGE UP (ref 0–0.5)
EOSINOPHIL NFR BLD AUTO: 2.2 % — SIGNIFICANT CHANGE UP (ref 0–6)
GLUCOSE SERPL-MCNC: 103 MG/DL — HIGH (ref 70–99)
GLUCOSE SERPL-MCNC: 106 MG/DL — HIGH (ref 70–99)
GLUCOSE SERPL-MCNC: 143 MG/DL — HIGH (ref 70–99)
HCT VFR BLD CALC: 28.6 % — LOW (ref 34.5–45)
HGB BLD-MCNC: 9.5 G/DL — LOW (ref 11.5–15.5)
IMM GRANULOCYTES NFR BLD AUTO: 0.3 % — SIGNIFICANT CHANGE UP (ref 0–1.5)
LYMPHOCYTES # BLD AUTO: 1.75 K/UL — SIGNIFICANT CHANGE UP (ref 1–3.3)
LYMPHOCYTES # BLD AUTO: 24.2 % — SIGNIFICANT CHANGE UP (ref 13–44)
MAGNESIUM SERPL-MCNC: 2.4 MG/DL — SIGNIFICANT CHANGE UP (ref 1.6–2.6)
MCHC RBC-ENTMCNC: 32.9 PG — SIGNIFICANT CHANGE UP (ref 27–34)
MCHC RBC-ENTMCNC: 33.2 GM/DL — SIGNIFICANT CHANGE UP (ref 32–36)
MCV RBC AUTO: 99 FL — SIGNIFICANT CHANGE UP (ref 80–100)
MONOCYTES # BLD AUTO: 1.25 K/UL — HIGH (ref 0–0.9)
MONOCYTES NFR BLD AUTO: 17.3 % — HIGH (ref 2–14)
NEUTROPHILS # BLD AUTO: 4 K/UL — SIGNIFICANT CHANGE UP (ref 1.8–7.4)
NEUTROPHILS NFR BLD AUTO: 55.3 % — SIGNIFICANT CHANGE UP (ref 43–77)
NRBC # BLD: 0 /100 WBCS — SIGNIFICANT CHANGE UP (ref 0–0)
PHOSPHATE SERPL-MCNC: 5.5 MG/DL — HIGH (ref 2.5–4.5)
PLATELET # BLD AUTO: 157 K/UL — SIGNIFICANT CHANGE UP (ref 150–400)
POTASSIUM SERPL-MCNC: 3.7 MMOL/L — SIGNIFICANT CHANGE UP (ref 3.5–5.3)
POTASSIUM SERPL-MCNC: 4.3 MMOL/L — SIGNIFICANT CHANGE UP (ref 3.5–5.3)
POTASSIUM SERPL-MCNC: 4.6 MMOL/L — SIGNIFICANT CHANGE UP (ref 3.5–5.3)
POTASSIUM SERPL-SCNC: 3.7 MMOL/L — SIGNIFICANT CHANGE UP (ref 3.5–5.3)
POTASSIUM SERPL-SCNC: 4.3 MMOL/L — SIGNIFICANT CHANGE UP (ref 3.5–5.3)
POTASSIUM SERPL-SCNC: 4.6 MMOL/L — SIGNIFICANT CHANGE UP (ref 3.5–5.3)
RBC # BLD: 2.89 M/UL — LOW (ref 3.8–5.2)
RBC # FLD: 13.4 % — SIGNIFICANT CHANGE UP (ref 10.3–14.5)
SODIUM SERPL-SCNC: 126 MMOL/L — LOW (ref 135–145)
SODIUM SERPL-SCNC: 127 MMOL/L — LOW (ref 135–145)
SODIUM SERPL-SCNC: 131 MMOL/L — LOW (ref 135–145)
WBC # BLD: 7.23 K/UL — SIGNIFICANT CHANGE UP (ref 3.8–10.5)
WBC # FLD AUTO: 7.23 K/UL — SIGNIFICANT CHANGE UP (ref 3.8–10.5)

## 2019-08-14 PROCEDURE — 99233 SBSQ HOSP IP/OBS HIGH 50: CPT

## 2019-08-14 PROCEDURE — 93010 ELECTROCARDIOGRAM REPORT: CPT | Mod: 77

## 2019-08-14 PROCEDURE — 93010 ELECTROCARDIOGRAM REPORT: CPT

## 2019-08-14 PROCEDURE — 90935 HEMODIALYSIS ONE EVALUATION: CPT | Mod: GC

## 2019-08-14 PROCEDURE — 99233 SBSQ HOSP IP/OBS HIGH 50: CPT | Mod: GC

## 2019-08-14 PROCEDURE — 99231 SBSQ HOSP IP/OBS SF/LOW 25: CPT

## 2019-08-14 RX ORDER — CARVEDILOL PHOSPHATE 80 MG/1
6.25 CAPSULE, EXTENDED RELEASE ORAL EVERY 12 HOURS
Refills: 0 | Status: DISCONTINUED | OUTPATIENT
Start: 2019-08-14 | End: 2019-08-15

## 2019-08-14 RX ORDER — CARVEDILOL PHOSPHATE 80 MG/1
6.25 CAPSULE, EXTENDED RELEASE ORAL EVERY 12 HOURS
Refills: 0 | Status: DISCONTINUED | OUTPATIENT
Start: 2019-08-14 | End: 2019-08-14

## 2019-08-14 RX ADMIN — APIXABAN 2.5 MILLIGRAM(S): 2.5 TABLET, FILM COATED ORAL at 19:07

## 2019-08-14 RX ADMIN — APIXABAN 2.5 MILLIGRAM(S): 2.5 TABLET, FILM COATED ORAL at 06:26

## 2019-08-14 RX ADMIN — CARVEDILOL PHOSPHATE 6.25 MILLIGRAM(S): 80 CAPSULE, EXTENDED RELEASE ORAL at 08:41

## 2019-08-14 RX ADMIN — CARVEDILOL PHOSPHATE 6.25 MILLIGRAM(S): 80 CAPSULE, EXTENDED RELEASE ORAL at 19:07

## 2019-08-14 NOTE — PROGRESS NOTE ADULT - ASSESSMENT
84 yo F w PMHx of ESRD on HD (MWF), nephrosclerosis, HTN, gout, COPD, OA, who presents with 3 days of dizziness and 1 day of SOB after missing dialysis on 8/9 and 8/12; found to have severe electrolyte abnormalities and severe bradycardia.  Nephrology consult placed in regards to ESRD on HD, for hyperkalemia requiring urgent hemodialysis; hyponatremia.    # ESRD on HD MWF via LUE AVF  - not compliant with outpatient HD, fluid restriction, diet  - s/p urgent HD on 8/13 due to severe symptomatic hyperkalemia, hyponatremia  - anticipate HD today as per schedule  - daily weights  - renal diet  - strict I/O    # Hyperkalemia  - Rx received in ED: calcium gluconate, sodium bicarb, insulin with dextrose, furosemide, albuterol  - EKG: severe junctional bradycardia, resolved  - s/p urgent HD on 8/13  - EP recommended ILR for episode of Afib with a 3 second conversion pause during HD  - K 4.6 today, plan for HD today with 2K bath    # Severe hyponatremia, likely chronic due to hypervolemia  - inital Na @119  - 200cc of D5W given overnight for Na overcorrection (128 from 119 in <24 hr)  - plan for HD today with 130 Na bath  - monitor BMP Q 4/hr, goal of Na today is 130     # HTN  - continue Coreg 6.25 mg po Q12hr  - UF with HD    # Renal bone disease  - continue renvela, calcitriol  - phos/pth/vit D as outpatient    # Anemia  - monitor H/H  - send iron panel  - transfusion as per primary team

## 2019-08-14 NOTE — PROGRESS NOTE ADULT - ASSESSMENT
84 year old woman, w/ a PMHx of ESRD (on HD M/W/F), HLD, and HTN presenting to ED with complaints of SOB x1 day s/p missed HD; Cardiology service consulted for bradycardia, and patient w/ HR of 41 on telemetry monitor at the time of examination.    #AFib  Patient w/ prior hx of AFlutter now in Atrial Fibrillation s/o HD. Rate controlled today w/ Carvedilol. Patient has likely underlying conduction issues.  - trend electrolytes  - continue apixaban 2.5 milliGRAM(s) Oral two times a day  - continue carvedilol 6.25 milliGRAM(s) Oral every 12 hours  - f/u EP regarding possible loop recorder    #Bradycardia  Likely 2/2 electrolyte abnormalities in the setting of missed HD. Now resolved s/p HD.  - continue telemetry monitoring  - f/u Nephrology    Case to be discussed with attending; recommendations are not final until attested by attending.

## 2019-08-14 NOTE — PROGRESS NOTE ADULT - PROBLEM SELECTOR PLAN 1
2/2 hypervolemic hyponatremia in setting of missed dialysis. Mentating at baseline throughout hospital course. Na on arrival 119 with improvement to 121 at noon, up to 128 at 6p concern for rate of increase so started on D5 40 cc/h. 127 on 8/14 morning.  -s/p emergent dialysis  -minimize acute change in sodium to <8 in 24h  -daily BMPs MWF dialysis. Pt missed last two sessions. Presented with dizziness and electrolyte abnormalities, hyperkalemia, hyponatremia, hyperphosphatemia, volume overload. S/p urgent HD on 8/13. Resolved  - s/p emergent dialysis, calcium gluconate x2, insulin and dextrose, albuterol nebs  - F/u BMP qd closely

## 2019-08-14 NOTE — PROGRESS NOTE ADULT - SUBJECTIVE AND OBJECTIVE BOX
Patient seen and evaluated at bedside.   200cc of D5W given for overcorrected sodium (at 128 from 119 in <24 hr)  Denies any active complains at present such as dizziness, weakness, sob, chest pain, n/v/d.  s/p urgent HD on 8/13 for severe hyperkalemia with EKG changes that resolved.  EP recommended ILR for episode of Afib with a 3 second conversion pause.  Plan for HD today as per schedule.            Meds:    apixaban 2.5 milliGRAM(s) Oral two times a day  carvedilol 6.25 milliGRAM(s) Oral every 12 hours      T(C): 36.6 (08-14-19 @ 09:17), Max: 37.2 (08-13-19 @ 18:00)  HR: 58 (08-14-19 @ 11:01) (56 - 86)  BP: 132/59 (08-14-19 @ 11:01) (116/54 - 200/75)  RR: 17 (08-14-19 @ 11:01) (16 - 28)  SpO2: 97% (08-14-19 @ 11:01) (96% - 100%)    Input/Output      08-13-19 @ 07:01  -  08-14-19 @ 07:00  --------------------------------------------------------  IN: 160 mL / OUT: 2500 mL / NET: -2340 mL            ROS:   Gen: no fever  Cardiovascular: no chest pain  Respiratory: no cough, no sputum  Gastrointestinal: no nausea, no vomiting, no change in bowel habits  Neurologic: no headache  : no urinary symptoms        PHYSICAL EXAM:  GENERAL: well-developed, well nourished, alert, no acute distress at present  NECK: supple, No JVD  CHEST/LUNG: equal air entry, CAT bilateral  HEART: normal S1/S2; RRR; no M/R/G  ABDOMEN: Soft, Nontender, +BS, No flank tenderness  EXTREMITIES: 1+ pitting edema, no calf tenderness bilateral  Neurology: AAOx3, no focal neurological deficit  SKIN: No rashes  ACCESS: LUE AVF, good thrill and bruit appreciated          LABS:                                       9.5    7.23  )-----------( 157      ( 14 Aug 2019 06:07 )             28.6       08-14    127<L>  |  86<L>  |  65<H>  ----------------------------<  103<H>  4.6   |  24  |  7.17<H>    Ca    8.5      14 Aug 2019 06:07  Phos  5.5     08-14  Mg     2.4     08-14    TPro  7.1  /  Alb  3.9  /  TBili  0.7  /  DBili  x   /  AST  89<H>  /  ALT  41  /  AlkPhos  121<H>  08-13      PT/INR - ( 13 Aug 2019 09:08 )   PT: 13.9 sec;   INR: 1.22          PTT - ( 13 Aug 2019 09:08 )  PTT:35.9 sec        CARDIAC MARKERS ( 13 Aug 2019 09:08 )  x     / 0.02 ng/mL / 191 U/L / x     / 4.0 ng/mL            RADIOLOGY & ADDITIONAL STUDIES:        < from: Xray Chest 1 View-PORTABLE IMMEDIATE (08.13.19 @ 09:11) >    EXAM:  XR CHEST PORTABLE IMMED 1V                          PROCEDURE DATE:  08/13/2019          INTERPRETATION:  Clinical history/reason for exam: Shortness of breath.    Single frontal view.    Comparison: January 10, 2019.    Findings/  impression: Stable cardiomegaly, thoracic aortic calcification. Lungs and   mediastinum, unremarkable. Stable bony structures.    < end of copied text > Patient seen and evaluated at bedside.   200cc of D5W given for overcorrected sodium (at 128 from 119 in <24 hr)  Denies any active complains at present such as dizziness, weakness, sob, chest pain, n/v/d.  s/p urgent HD on 8/13 for severe hyperkalemia with EKG changes that resolved.  EP recommended ILR for episode of Afib with a 3 second conversion pause.  Plan for HD today as per schedule.            Meds:    apixaban 2.5 milliGRAM(s) Oral two times a day  carvedilol 6.25 milliGRAM(s) Oral every 12 hours      T(C): 36.6 (08-14-19 @ 09:17), Max: 37.2 (08-13-19 @ 18:00)  HR: 58 (08-14-19 @ 11:01) (56 - 86)  BP: 132/59 (08-14-19 @ 11:01) (116/54 - 200/75)  RR: 17 (08-14-19 @ 11:01) (16 - 28)  SpO2: 97% (08-14-19 @ 11:01) (96% - 100%)    Input/Output      08-13-19 @ 07:01  -  08-14-19 @ 07:00  --------------------------------------------------------  IN: 160 mL / OUT: 2500 mL / NET: -2340 mL            ROS:   Gen: no fever  Cardiovascular: no chest pain  Respiratory: no cough, no sputum, no SOB  Gastrointestinal: no nausea, no vomiting, no change in bowel habits  Neurologic: no headache, no dizziness  : no urinary symptoms        PHYSICAL EXAM:  GENERAL: well-developed, well nourished, alert, no acute distress at present  NECK: supple, No JVD  CHEST/LUNG: equal air entry, CAT bilateral  HEART: normal S1/S2; RRR; no M/R/G  ABDOMEN: Soft, Nontender, +BS, No flank tenderness  EXTREMITIES: tr pitting edema, no calf tenderness bilateral  Neurology: AAOx3, no focal neurological deficit  SKIN: No rashes  ACCESS: LUE AVF, good thrill and bruit appreciated          LABS:                                       9.5    7.23  )-----------( 157      ( 14 Aug 2019 06:07 )             28.6       08-14    127<L>  |  86<L>  |  65<H>  ----------------------------<  103<H>  4.6   |  24  |  7.17<H>    Ca    8.5      14 Aug 2019 06:07  Phos  5.5     08-14  Mg     2.4     08-14    TPro  7.1  /  Alb  3.9  /  TBili  0.7  /  DBili  x   /  AST  89<H>  /  ALT  41  /  AlkPhos  121<H>  08-13      PT/INR - ( 13 Aug 2019 09:08 )   PT: 13.9 sec;   INR: 1.22          PTT - ( 13 Aug 2019 09:08 )  PTT:35.9 sec        CARDIAC MARKERS ( 13 Aug 2019 09:08 )  x     / 0.02 ng/mL / 191 U/L / x     / 4.0 ng/mL            RADIOLOGY & ADDITIONAL STUDIES:        < from: Xray Chest 1 View-PORTABLE IMMEDIATE (08.13.19 @ 09:11) >    EXAM:  XR CHEST PORTABLE IMMED 1V                          PROCEDURE DATE:  08/13/2019          INTERPRETATION:  Clinical history/reason for exam: Shortness of breath.    Single frontal view.    Comparison: January 10, 2019.    Findings/  impression: Stable cardiomegaly, thoracic aortic calcification. Lungs and   mediastinum, unremarkable. Stable bony structures.    < end of copied text >

## 2019-08-14 NOTE — PROGRESS NOTE ADULT - ASSESSMENT
Pt is an 85 yo F with pmh ESRD (dialysis MWF), hld, htn, COPD, paroxysmal afib (on eliquis, reports compliance) who presented to the ED with dizziness x1d. Pt skipped last HD session as she was not feeling well. In ED pt bradycardic with HR 41. Labs pertinent for K 6.1, Na 119. Received emergent dialysis in ED admitted to ICU for further monitoring and now stepped down to 7L as labs have begun to normalize.    RENAL and ELECTROLYTES  #ESRD on HD: patient had missed dialysis session yesterday and is now s/p emergent dialysis in the setting of hypertensive urgency and hyperkalemia  -Nephrology consulted  - S/p 2.5L removed via HD 8/13   -Avoid nephrotoxic medications  - Renally dose medications     #Hyperkalemia: 2/2 missed hemodialysis session  -Resolved  -s/p emergent dialysis, calcium gluconate x2, insulin and dextrose, albuterol nebs  -7p K 4.2  - F/u BMPqd closely     #Hyponatremia: 2/2 hypervolemic hyponatremia in setting of missed dialysis. Mentating at baseline throughout hospital course.   -s/p emergent dialysis  -minimize acute change in sodium to <8 in 24h  -repeat BMP with inc Na to 128  - Started on D5 40 cc/h will repeat BMP 12am    NEURO  #Currently alert, oriented, and following commands  #No active problems    CARDIOVASCULAR  #Hypertensive Emergency: 2/2 missed hemodialysis session  -s/p emergent dialysis  -BP normotensive at present, hold home meds for now  - f/u TTE     #Junctional Bradycardia:   -Cardiology consulted: recommend TTE  -Resolved, current HR 70's   -hold coreg  - EP recommending Loop recorder before discharge     #Paroxysmal afib - compliant with eliquis  - AC: restarted eliquis 2.5 bid  - Rate: rate controlled with episodes of bradycardia, holding home coreg for now     PULMONARY  #No active issues    GASTROINTESTINAL  #No active issues    INFECTIOUS DISEASE  #No active issues or suspicion of infectious process    ENDOCRINE  #No active issues    FLUIDS/ELECTROLYTES/NUTRITION  -D5 40 cc/h  -Monitor, cautious repletion as ESRD  -Renal diet  PROPHYLAXIS  -Eliquis  -Protonix    DISPO: 7L  FULL CODE Pt is an 85 yo F with pmh ESRD (dialysis MWF), hld, htn, COPD, paroxysmal afib (on eliquis, reports compliance) who presented to the ED with dizziness x1d. Pt skipped last HD session as she was not feeling well. In ED pt bradycardic with HR 41. Labs pertinent for K 6.1, Na 119. Received emergent dialysis in ED admitted to ICU for further monitoring and now stepped down to 7L as labs have begun to normalize.    RENAL and ELECTROLYTES  #ESRD on HD: patient had missed dialysis session yesterday and is now s/p emergent dialysis in the setting of hypertensive urgency and hyperkalemia  - Nephrology consulted  - S/p 2.5L removed via HD 8/13   - Avoid nephrotoxic medications  - Renally dose medications  - Will receive HD today, 8/14    #Hyperkalemia: 2/2 missed hemodialysis session  -Resolved  -s/p emergent dialysis, calcium gluconate x2, insulin and dextrose, albuterol nebs  -6am K 4.6  - F/u BMP qd closely     #Hyponatremia: 2/2 hypervolemic hyponatremia in setting of missed dialysis. Mentating at baseline throughout hospital course.   -s/p emergent dialysis  -minimize acute change in sodium to <8 in 24h  -repeat BMP with inc Na to 128  - Started on D5 40 cc/h, repeat BMP at 12am Na of 126  - 6am Na of 127    NEURO  #Currently alert, oriented, and following commands  #No active problems    CARDIOVASCULAR  #Hypertensive Emergency: 2/2 missed hemodialysis session  -s/p emergent dialysis  -BP normotensive at present, hold home meds for now  - f/u TTE  - repeat EKG at 11:43am on 8/14 shows A. fib with rate of 60    #Junctional Bradycardia:   -Cardiology consulted: recommend TTE  -Resolved, current HR 70's   -coreg was held 8/13, restarted 8/14 for hypertension with SBP to 200  - EP recommending Loop recorder before discharge     #Paroxysmal afib - compliant with eliquis  - AC: restarted eliquis 2.5 bid  - Rate: rate controlled with episodes of bradycardia, coreg held for 8/13  - Cored restarted for SBP to 200     PULMONARY  #No active issues    GASTROINTESTINAL  #No active issues    INFECTIOUS DISEASE  #No active issues or suspicion of infectious process    ENDOCRINE  #No active issues    FLUIDS/ELECTROLYTES/NUTRITION  -D5 40 cc/h  -Monitor, cautious repletion as ESRD  -Renal diet  PROPHYLAXIS  -Eliquis  -Protonix    DISPO: 7L  FULL CODE Pt is an 83 yo F with pmh ESRD (dialysis MWF), hld, htn, COPD, paroxysmal afib (on eliquis, reports compliance) who presented to the ED with dizziness x1d. Pt skipped last HD session as she was not feeling well. In ED pt bradycardic with HR 41. Labs pertinent for K 6.1, Na 119. Received emergent dialysis in ED admitted to ICU for further monitoring and now stepped down to 7L as labs have begun to normalize.    RENAL and ELECTROLYTES  #ESRD on HD: patient had missed dialysis session yesterday and is now s/p emergent dialysis in the setting of hypertensive urgency and hyperkalemia  - Nephrology consulted  - S/p 2.5L removed via HD 8/13   - Avoid nephrotoxic medications  - Renally dose medications  - Will receive HD today, 8/14    #Hyperkalemia: 2/2 missed hemodialysis session  -Resolved  -s/p emergent dialysis, calcium gluconate x2, insulin and dextrose, albuterol nebs  -6am K 4.6  - F/u BMP qd closely     #Hyponatremia: 2/2 hypervolemic hyponatremia in setting of missed dialysis. Mentating at baseline throughout hospital course.   -s/p emergent dialysis  -minimize acute change in sodium to <8 in 24h  -repeat BMP with inc Na to 128  - Started on D5 40 cc/h, repeat BMP at 12am Na of 126  - 6am Na of 127  - continue to correct Na by no more than 6 in 24 hours  - trend sodium    NEURO  #Currently alert, oriented, and following commands  #No active problems    CARDIOVASCULAR  #Hypertensive Emergency: 2/2 missed hemodialysis session  -s/p emergent dialysis  -BP normotensive at present, hold home meds for now  - f/u TTE  - repeat EKG at 11:43am on 8/14 shows A. fib with rate of 60    #Junctional Bradycardia:   -Cardiology consulted: recommend TTE  -Resolved, current HR 70's   -coreg was held 8/13, restarted 8/14 for hypertension with SBP to 200  - EP recommending Loop recorder before discharge     #Paroxysmal afib - compliant with eliquis  - AC: restarted eliquis 2.5 bid  - Rate: rate controlled with episodes of bradycardia, coreg held for 8/13  - Cored restarted for SBP to 200     PULMONARY  #No active issues    GASTROINTESTINAL  #No active issues    INFECTIOUS DISEASE  #No active issues or suspicion of infectious process    ENDOCRINE  #No active issues    FLUIDS/ELECTROLYTES/NUTRITION  -D5 40 cc/h  -Monitor, cautious repletion as ESRD  -Renal diet  PROPHYLAXIS  -Eliquis  -Protonix    DISPO: 7L  FULL CODE

## 2019-08-14 NOTE — PROGRESS NOTE ADULT - SUBJECTIVE AND OBJECTIVE BOX
Hospital Course:  Pt is an 85 yo F with pmh ESRD (dialysis MWF), hld, htn, COPD, paroxysmal afib (on eliquis, reports compliance) who presented to the ED with dizziness x1d. Pt reports dizzy spells 2x/d, sometimes provoked by going from seated to standing position. One day ago she also had SOB. Pt has been on dialysis for 5 months (followed by Kir[facility] Ceravntes), last HD 8/9. Pt skipped HD as she was not feeling well. In ED pt bradycardic with HR 41. Labs pertinent for K 6.1, Na 119. Pt given Ca gluconate x2, albuterol nebulizers, and insulin 10 with D50. Pt received emergent HD in the ED. Patient has had prior admission for same scenario in Jan 2019 but was admitted to CCU due to course c/b junctional rhythm and bigeminy, which resolved after HD. Pt admitted to ICU for further monitoring and now stepped down to 7L as labs have begun to normalize.    Subjective:  Pt seen and examined at bedside in no acute distress. No complaints at present. 7pm K 4.2. Na on arrival 119 with improvement to 121 at noon, up to 128 at 6p concern for rate of increase so started on D5 40 cc/h and will continue to trend Na.    REVIEW OF SYSTEMS:    CONSTITUTIONAL: No weakness, fevers or chills.   EYES/ENT: No visual changes;  No vertigo or throat pain   NECK: No pain or stiffness  RESPIRATORY: No cough, wheezing, hemoptysis; No shortness of breath  CARDIOVASCULAR: No chest pain or palpitations  GASTROINTESTINAL: No abdominal or epigastric pain. No nausea, vomiting, or hematemesis; No diarrhea or constipation. No melena or hematochezia.  GENITOURINARY: No dysuria, frequency or hematuria  NEUROLOGICAL: No numbness or weakness  SKIN: No itching, burning, rashes, or lesions   All other review of systems is negative unless indicated above.    VITAL SIGNS:  T(C): 37.2 (08-13-19 @ 18:00), Max: 37.2 (08-13-19 @ 18:00)  T(F): 99 (08-13-19 @ 18:00), Max: 99 (08-13-19 @ 18:00)  HR: 72 (08-13-19 @ 19:30) (35 - 86)  BP: 148/67 (08-13-19 @ 19:30) (123/79 - 240/81)  BP(mean): 96 (08-13-19 @ 19:30) (74 - 123)  RR: 18 (08-13-19 @ 19:30) (16 - 31)  SpO2: 97% (08-13-19 @ 19:30) (94% - 100%)  Wt(kg): --    PHYSICAL EXAM:  Constitutional: WDWN resting comfortably in bed; NAD  Head: NC/AT  Eyes: PERRL, EOMI, anicteric sclera  ENT: no nasal discharge; MMM  Neck: supple; no JVD   Respiratory: CTA B/L; no W/R/R, no retractions  Cardiac: +S1/S2; RRR; no M/R/G  Gastrointestinal: soft, NT/ND; no rebound or guarding; +BSx4  Extremities: WWP, no clubbing or cyanosis; 1+ LE edema B/L  Vascular: 2+ radial, DP/PT pulses B/L  Dermatologic: skin warm, dry and intact; no rashes, wounds, or scars  Neurologic: AAOx3; no focal deficits  Psychiatric: affect and characteristics of appearance, verbalizations, behaviors are appropriate    MEDICATIONS  (STANDING):  apixaban 2.5 milliGRAM(s) Oral two times a day  dextrose 5%. 1000 milliLiter(s) (40 mL/Hr) IV Continuous <Continuous>    MEDICATIONS  (PRN):    Allergies    No Known Allergies    Intolerances    Norvasc (Other)      LABS:                        10.9   8.42  )-----------( 209      ( 13 Aug 2019 09:08 )             32.7     08-13    128<L>  |  x   |  x   ----------------------------<  x   x    |  x   |  x     Ca    8.7      13 Aug 2019 17:47  Phos  5.1     08-13  Mg     2.3     08-13    TPro  7.1  /  Alb  3.9  /  TBili  0.7  /  DBili  x   /  AST  89<H>  /  ALT  41  /  AlkPhos  121<H>  08-13    PT/INR - ( 13 Aug 2019 09:08 )   PT: 13.9 sec;   INR: 1.22          PTT - ( 13 Aug 2019 09:08 )  PTT:35.9 sec    CAPILLARY BLOOD GLUCOSE      POCT Blood Glucose.: 271 mg/dL (13 Aug 2019 09:58)      RADIOLOGY & ADDITIONAL TESTS: Reviewed. Hospital Course:  Pt is an 83 yo F with pmh ESRD (dialysis MWF), hld, htn, COPD, paroxysmal afib (on eliquis, reports compliance) who presented to the ED with dizziness x1d. Pt reports dizzy spells 2x/d, sometimes provoked by going from seated to standing position. One day ago she also had SOB. Pt has been on dialysis for 5 months (followed by Kir[facility] Cervantes), last HD 8/9. Pt skipped HD as she was not feeling well. In ED pt bradycardic with HR 41. Labs pertinent for K 6.1, Na 119. Pt given Ca gluconate x2, albuterol nebulizers, and insulin 10 with D50. Pt received emergent HD in the ED. Patient has had prior admission for same scenario in Jan 2019 but was admitted to CCU due to course c/b junctional rhythm and bigeminy, which resolved after HD. Pt admitted to ICU for further monitoring and now stepped down to 7L as labs have begun to normalize.    Subjective:  Pt seen and examined at bedside in no acute distress. No complaints at present. 7pm K 4.2. Na on arrival 119 with improvement to 121 at noon, up to 128 at 6p concern for rate of increase so started on D5 40 cc/h and will continue to trend Na.    REVIEW OF SYSTEMS:    CONSTITUTIONAL: No weakness, fevers or chills.   EYES/ENT: No visual changes;  No vertigo or throat pain   NECK: No pain or stiffness  RESPIRATORY: No cough, wheezing, hemoptysis; No shortness of breath  CARDIOVASCULAR: No chest pain or palpitations  GASTROINTESTINAL: No abdominal or epigastric pain. No nausea, vomiting, or hematemesis; No diarrhea or constipation. No melena or hematochezia.  GENITOURINARY: No dysuria, frequency or hematuria  NEUROLOGICAL: No numbness or weakness  SKIN: No itching, burning, rashes, or lesions   All other review of systems is negative unless indicated above.    Vital Signs Last 24 Hrs  T(C): 36.8 (14 Aug 2019 06:00), Max: 37.2 (13 Aug 2019 18:00)  T(F): 98.3 (14 Aug 2019 06:00), Max: 99 (13 Aug 2019 18:00)  HR: 70 (14 Aug 2019 08:27) (35 - 86)  BP: 200/75 (14 Aug 2019 08:27) (116/54 - 240/81)  BP(mean): 108 (14 Aug 2019 08:27) (74 - 123)  RR: 17 (14 Aug 2019 08:27) (16 - 31)  SpO2: 98% (14 Aug 2019 08:27) (96% - 100%)    PHYSICAL EXAM:  Constitutional: WDWN resting comfortably in bed; NAD  Head: NC/AT  Eyes: PERRL, EOMI, anicteric sclera  ENT: no nasal discharge; MMM  Neck: supple; no JVD   Respiratory: CTA B/L; no W/R/R, no retractions  Cardiac: +S1/S2; RRR; no M/R/G  Gastrointestinal: soft, NT/ND; no rebound or guarding; +BSx4  Extremities: WWP, no clubbing or cyanosis; 1+ LE edema B/L  Vascular: 2+ radial, DP/PT pulses B/L  Dermatologic: skin warm, dry and intact; no rashes, wounds, or scars  Neurologic: AAOx3; no focal deficits      MEDICATIONS  (STANDING):  apixaban 2.5 milliGRAM(s) Oral two times a day  carvedilol 6.25 milliGRAM(s) Oral every 12 hours      Allergies    No Known Allergies    Intolerances    Norvasc (Other)      LABS:                         9.5    7.23  )-----------( 157      ( 14 Aug 2019 06:07 )             28.6     08-14    127<L>  |  86<L>  |  65<H>  ----------------------------<  103<H>  4.6   |  24  |  7.17<H>    Ca    8.5      14 Aug 2019 06:07  Phos  5.5     08-14  Mg     2.4     08-14    TPro  7.1  /  Alb  3.9  /  TBili  0.7  /  DBili  x   /  AST  89<H>  /  ALT  41  /  AlkPhos  121<H>  08-13    PT/INR - ( 13 Aug 2019 09:08 )   PT: 13.9 sec;   INR: 1.22          PTT - ( 13 Aug 2019 09:08 )  PTT:35.9 sec    CARDIAC MARKERS ( 13 Aug 2019 09:08 )  x     / 0.02 ng/mL / 191 U/L / x     / 4.0 ng/mL            RADIOLOGY, EKG & ADDITIONAL TESTS: Reviewed.   < from: Xray Chest 1 View-PORTABLE IMMEDIATE (08.13.19 @ 09:11) >  impression: Stable cardiomegaly, thoracic aortic calcification. Lungs and   mediastinum, unremarkable. Stable bony structures.    < end of copied text > Transfer from  to   Hospital Course:  Pt is an 85 yo F with pmh ESRD (dialysis MWF), hld, htn, COPD, paroxysmal afib (on eliquis, reports compliance) who presented to the ED with dizziness x1d. Pt reports dizzy spells 2x/d, sometimes provoked by going from seated to standing position. One day ago she also had SOB. Pt has been on dialysis for 5 months (followed by Kir[facility] Cervantes), last HD 8/9. Pt skipped HD as she was not feeling well. In ED pt bradycardic with HR 41. Labs pertinent for K 6.1, Na 119. Pt given Ca gluconate x2, albuterol nebulizers, and insulin 10 with D50. Pt received emergent HD in the ED. Patient has had prior admission for same scenario in Jan 2019 but was admitted to CCU due to course c/b junctional rhythm and bigeminy, which resolved after HD. Pt admitted to ICU for further monitoring and now stepped down to 7L as labs have begun to normalize. Patient sodium went up to 128 at 6pm on 8/13 and was started on D5 40 cc/h for the overcorrection. AM sodium 8/14 was at 127 goal for today and will continue to trend sodium for goal of 133 tomorrow.    Subjective:  Patient seen and examined at bedside in no acute distress. No complaints at present.      REVIEW OF SYSTEMS:    CONSTITUTIONAL: No weakness, fevers or chills.   EYES/ENT: No visual changes;  No vertigo or throat pain   NECK: No pain or stiffness  RESPIRATORY: No cough, wheezing, hemoptysis; No shortness of breath  CARDIOVASCULAR: No chest pain or palpitations  GASTROINTESTINAL: No abdominal or epigastric pain. No nausea, vomiting, or hematemesis; No diarrhea or constipation. No melena or hematochezia.  GENITOURINARY: No dysuria, frequency or hematuria  NEUROLOGICAL: No numbness or weakness  SKIN: No itching, burning, rashes, or lesions   All other review of systems is negative unless indicated above.    Vital Signs Last 24 Hrs  T(C): 36.8 (14 Aug 2019 06:00), Max: 37.2 (13 Aug 2019 18:00)  T(F): 98.3 (14 Aug 2019 06:00), Max: 99 (13 Aug 2019 18:00)  HR: 70 (14 Aug 2019 08:27) (35 - 86)  BP: 200/75 (14 Aug 2019 08:27) (116/54 - 240/81)  BP(mean): 108 (14 Aug 2019 08:27) (74 - 123)  RR: 17 (14 Aug 2019 08:27) (16 - 31)  SpO2: 98% (14 Aug 2019 08:27) (96% - 100%)    PHYSICAL EXAM:  Constitutional: WDWN resting comfortably in bed; NAD  Head: NC/AT  Eyes: PERRL, EOMI, anicteric sclera  ENT: no nasal discharge; MMM  Neck: supple; no JVD   Respiratory: CTA B/L; no W/R/R, no retractions  Cardiac: +S1/S2; RRR; no M/R/G  Gastrointestinal: soft, NT/ND; no rebound or guarding; +BSx4  Extremities: WWP, no clubbing or cyanosis; 1+ LE edema B/L  Vascular: 2+ radial, DP/PT pulses B/L  Dermatologic: skin warm, dry and intact; no rashes, wounds, or scars  Neurologic: AAOx3; no focal deficits      MEDICATIONS  (STANDING):  apixaban 2.5 milliGRAM(s) Oral two times a day  carvedilol 6.25 milliGRAM(s) Oral every 12 hours      Allergies    No Known Allergies    Intolerances    Norvasc (Other)      LABS:                         9.5    7.23  )-----------( 157      ( 14 Aug 2019 06:07 )             28.6     08-14    127<L>  |  86<L>  |  65<H>  ----------------------------<  103<H>  4.6   |  24  |  7.17<H>    Ca    8.5      14 Aug 2019 06:07  Phos  5.5     08-14  Mg     2.4     08-14    TPro  7.1  /  Alb  3.9  /  TBili  0.7  /  DBili  x   /  AST  89<H>  /  ALT  41  /  AlkPhos  121<H>  08-13    PT/INR - ( 13 Aug 2019 09:08 )   PT: 13.9 sec;   INR: 1.22          PTT - ( 13 Aug 2019 09:08 )  PTT:35.9 sec    CARDIAC MARKERS ( 13 Aug 2019 09:08 )  x     / 0.02 ng/mL / 191 U/L / x     / 4.0 ng/mL            RADIOLOGY, EKG & ADDITIONAL TESTS: Reviewed.   < from: Xray Chest 1 View-PORTABLE IMMEDIATE (08.13.19 @ 09:11) >  impression: Stable cardiomegaly, thoracic aortic calcification. Lungs and   mediastinum, unremarkable. Stable bony structures.    < end of copied text >

## 2019-08-14 NOTE — PROGRESS NOTE ADULT - PROBLEM SELECTOR PLAN 5
#Paroxysmal afib - compliant with eliquis  - AC: restarted eliquis 2.5 bid  - Rate: rate controlled with episodes of bradycardia, holding home coreg for now

## 2019-08-14 NOTE — PROGRESS NOTE ADULT - ASSESSMENT
Pt is an 83 yo F with pmh ESRD (dialysis MWF), hld, htn, COPD, paroxysmal afib (on eliquis, reports compliance) who presented to the ED with dizziness x1d. Pt skipped last HD session as she was not feeling well. In ED pt bradycardic with HR 41. Labs pertinent for K 6.1, Na 119. Received emergent dialysis in ED admitted to ICU for further monitoring and now stepped down to 7L as labs have begun to normalize.    RENAL and ELECTROLYTES  #ESRD on HD: patient had missed dialysis session yesterday and is now s/p emergent dialysis in the setting of hypertensive urgency and hyperkalemia  -Nephrology consulted  - S/p 2.5L removed via HD 8/13   -Avoid nephrotoxic medications  - Renally dose medications     #Hyperkalemia: 2/2 missed hemodialysis session  -Resolved  -s/p emergent dialysis, calcium gluconate x2, insulin and dextrose, albuterol nebs  -7p K 4.2  - F/u BMPqd closely     #Hyponatremia: 2/2 hypervolemic hyponatremia in setting of missed dialysis. Mentating at baseline throughout hospital course.   -s/p emergent dialysis  -minimize acute change in sodium to <8 in 24h  -repeat BMP with inc Na to 128  - Started on D5 40 cc/h will repeat BMP 12am    NEURO  #Currently alert, oriented, and following commands  #No active problems    CARDIOVASCULAR  #Hypertensive Emergency: 2/2 missed hemodialysis session  -s/p emergent dialysis  -BP normotensive at present, hold home meds for now  - f/u TTE     #Junctional Bradycardia:   -Cardiology consulted: recommend TTE  -Resolved, current HR 70's   -hold coreg  - EP recommending Loop recorder before discharge     #Paroxysmal afib - compliant with eliquis  - AC: restarted eliquis 2.5 bid  - Rate: rate controlled with episodes of bradycardia, holding home coreg for now     PULMONARY  #No active issues    GASTROINTESTINAL  #No active issues    INFECTIOUS DISEASE  #No active issues or suspicion of infectious process    ENDOCRINE  #No active issues    FLUIDS/ELECTROLYTES/NUTRITION  -D5 40 cc/h  -Monitor, cautious repletion as ESRD  -Renal diet  PROPHYLAXIS  -Eliquis  -Protonix    DISPO: 7L  FULL CODE Pt is an 83 yo F with pmh ESRD (dialysis MWF), hld, htn, COPD, paroxysmal afib (on eliquis, reports compliance) who presented to the ED with dizziness x1d. Pt skipped last HD session as she was not feeling well. In ED pt bradycardic with HR 41. Labs pertinent for K 6.1, Na 119. Received emergent dialysis in ED admitted to ICU for further monitoring and now stepped down to 7L as labs have begun to normalize.    RENAL and ELECTROLYTES    #      NEURO  #Currently alert, oriented, and following commands  #No active problems    CARDIOVASCULAR  #Hypertensive Emergency: 2/2 missed hemodialysis session  -s/p emergent dialysis  -BP normotensive at present, hold home meds for now  - f/u TTE     #Junctional Bradycardia:   -Cardiology consulted: recommend TTE  -Resolved, current HR 70's   -hold coreg  - EP recommending Loop recorder before discharge     #Paroxysmal afib - compliant with eliquis  - AC: restarted eliquis 2.5 bid  - Rate: rate controlled with episodes of bradycardia, holding home coreg for now     PULMONARY  #No active issues    GASTROINTESTINAL  #No active issues    INFECTIOUS DISEASE  #No active issues or suspicion of infectious process    ENDOCRINE  #No active issues    FLUIDS/ELECTROLYTES/NUTRITION  -D5 40 cc/h  -Monitor, cautious repletion as ESRD  -Renal diet  PROPHYLAXIS  -Eliquis  -Protonix    DISPO: 7L  FULL CODE 83 yo F with pmh ESRD (dialysis MWF), hld, htn, COPD, paroxysmal afib (on eliquis, reports compliance) who presented to the ED with dizziness x1d. Pt skipped last HD session as she was not feeling well. In ED pt bradycardic with HR 41. Labs pertinent for K 6.1, Na 119. Received emergent dialysis in ED admitted to ICU for further monitoring and now stepped down to 7L as labs have begun to normalize.    RENAL and ELECTROLYTES    #      NEURO  #Currently alert, oriented, and following commands  #No active problems    CARDIOVASCULAR              PULMONARY  #No active issues    GASTROINTESTINAL  #No active issues    INFECTIOUS DISEASE  #No active issues or suspicion of infectious process    ENDOCRINE  #No active issues    FLUIDS/ELECTROLYTES/NUTRITION  -D5 40 cc/h  -Monitor, cautious repletion as ESRD  -Renal diet  PROPHYLAXIS  -Eliquis  -Protonix    DISPO: 7L  FULL CODE 83 yo F with pmh ESRD (dialysis MWF), hld, htn, COPD, paroxysmal afib (on eliquis, reports compliance) who presented to the ED with dizziness x1d. Pt skipped last HD session as she was not feeling well. In ED pt bradycardic with HR 41. Labs pertinent for K 6.1, Na 119. Received emergent dialysis in ED admitted to ICU for further monitoring and now stepped down to 7L as labs have begun to normalize.

## 2019-08-14 NOTE — PROGRESS NOTE ADULT - PROBLEM SELECTOR PLAN 9
1) PCP   Contacted on Admission: Dr. Cervantes  2) Date of Contact with PCP:  3) PCP Contacted at Discharge: (Y/N)  4) Summary of Handoff Given to PCP:   5) Post-Discharge Appointment Date and Location:

## 2019-08-14 NOTE — PROGRESS NOTE ADULT - SUBJECTIVE AND OBJECTIVE BOX
INTERVAL EVENTS: Patient seen and examined at bedside; found resting comfortably in bed. No acute overnight events, and no complaints. patient states that she feels "better," and denies CP/SOB/dizziness/palpitations.    PAST MEDICAL & SURGICAL HISTORY:  ESRD on dialysis  Gout  HLD (hyperlipidemia)  HTN (hypertension)  CKD (chronic kidney disease)  AV fistula: LUE    Home Medications:  calcitriol 0.25 mcg oral capsule: 1 cap(s) orally Monday, Wednesday, and Friday (08 Jan 2019 16:10)  D3 1000 oral tablet: 2 tab(s) orally once a day (08 Jan 2019 16:10)  Emla 2.5%-2.5% topical cream: Apply topically to affected area Monday, Wednesday, and Friday (08 Jan 2019 16:10)  folic acid 1 mg oral tablet: 1 tab(s) orally once a day (08 Jan 2019 16:10)  Mircera 50 mcg/0.3 mL injectable solution: 1 each injectable once a month (08 Jan 2019 16:10)  multivitamin: 1 tab(s) orally once a day (08 Jan 2019 16:10)  Renvela 800 mg oral tablet: 1 tab(s) orally 3 times a day (with meals) (08 Jan 2019 16:10)  simvastatin 20 mg oral tablet: 1 tab(s) orally once a day (at bedtime) (08 Jan 2019 16:10)  Venofer 20 mg/mL intravenous solution: 1 each intravenous prn (08 Jan 2019 16:10)    MEDICATIONS  (STANDING):  apixaban 2.5 milliGRAM(s) Oral two times a day  carvedilol 6.25 milliGRAM(s) Oral every 12 hours    MEDICATIONS  (PRN):    Vital Signs Last 24 Hrs  T(C): 36.6 (14 Aug 2019 09:17), Max: 37.2 (13 Aug 2019 18:00)  T(F): 97.8 (14 Aug 2019 09:17), Max: 99 (13 Aug 2019 18:00)  HR: 58 (14 Aug 2019 11:01) (56 - 86)  BP: 132/59 (14 Aug 2019 11:01) (116/54 - 200/75)  BP(mean): 85 (14 Aug 2019 11:01) (74 - 108)  RR: 17 (14 Aug 2019 11:01) (16 - 28)  SpO2: 97% (14 Aug 2019 11:01) (96% - 100%)    PHYSICAL EXAM:  GEN: Awake, alert. NAD.   HEENT: No JVD.  RESP: CTA b/l  CV: RRR. Normal S1/S2. No m/r/g. No S3.  ABD: Soft. NT/ND. BS+  EXT: Warm. No edema, clubbing, or cyanosis.   NEURO: AAOx3. No focal deficits.     LABS:                        9.5    7.23  )-----------( 157      ( 14 Aug 2019 06:07 )             28.6     08-14    127<L>  |  86<L>  |  65<H>  ----------------------------<  103<H>  4.6   |  24  |  7.17<H>    Ca    8.5      14 Aug 2019 06:07  Phos  5.5     08-14  Mg     2.4     08-14    TPro  7.1  /  Alb  3.9  /  TBili  0.7  /  DBili  x   /  AST  89<H>  /  ALT  41  /  AlkPhos  121<H>  08-13    CARDIAC MARKERS ( 13 Aug 2019 09:08 )  x     / 0.02 ng/mL / 191 U/L / x     / 4.0 ng/mL      PT/INR - ( 13 Aug 2019 09:08 )   PT: 13.9 sec;   INR: 1.22          PTT - ( 13 Aug 2019 09:08 )  PTT:35.9 sec    I&O's Summary    13 Aug 2019 07:01  -  14 Aug 2019 07:00  --------------------------------------------------------  IN: 160 mL / OUT: 2500 mL / NET: -2340 mL      TELEMETRY: Irregularly irregular rhythm at ~60bpm.   EKG: no new EKG for review.

## 2019-08-14 NOTE — PROGRESS NOTE ADULT - PROBLEM SELECTOR PLAN 3
#Hypertensive Emergency: 2/2 missed hemodialysis session  - s/p emergent dialysis  - BP normotensive at present, hold home meds for now  - f/u TTE

## 2019-08-14 NOTE — PROGRESS NOTE ADULT - PROBLEM SELECTOR PLAN 4
#Junctional Bradycardia:   - Cardiology consulted: recommend TTE  - Resolved, current HR 70's   - hold coreg  - EP recommending Loop recorder before discharge

## 2019-08-14 NOTE — PROGRESS NOTE ADULT - PROBLEM SELECTOR PLAN 2
MWF dialysis. Pt missed last two sessions. Presented with dizziness and hyperkalemia, hyponatremia, hyperphosphatemia, volume overload.  S/p urgent HD on 8/13.  Resolved hyperkalemia: 2/2 missed hemodialysis session  -Resolved  -s/p emergent dialysis, calcium gluconate x2, insulin and dextrose, albuterol nebs  - F/u BMPqd closely # 2/2 hypervolemic hyponatremia in setting of missed dialysis. Mentating at baseline throughout hospital course. Na on arrival 119 with improvement to 121 at noon, up to 128 at 6p concern for rate of increase so started on D5 40 cc/h. 127 on 8/14 morning. s/p emergent dialysis, initial Na @119  - 200cc of D5W given overnight for Na overcorrection (128 from 119 in <24 hr)  - plan for HD today with 130 Na bath  - monitor BMP Q 4/hr, goal of Na today is 130   - minimize acute change in sodium to <8 in 24h  - daily BMPs  - f/u nephro recs # 2/2 hypervolemic hyponatremia in setting of missed dialysis. Mentating at baseline throughout hospital course. Na on arrival 119 with improvement to 121 at noon, up to 128 at 6p concern for rate of increase so started on D5 40 cc/h. 127 on 8/14 morning. s/p emergent dialysis, initial Na @119  - 200cc of D5W given overnight for Na overcorrection (128 from 119 in <24 hr)  - plan for HD today with 130 Na bath  - monitor BMP Q 4/hr, goal of Na today is 130   - minimize acute change in sodium to <8 in 24h  - daily BMPs  - f/u nephro recs  - recheck Na this pm -- goal in pm around 130

## 2019-08-14 NOTE — PROGRESS NOTE ADULT - SUBJECTIVE AND OBJECTIVE BOX
Patient was seen and evaluated on dialysis.   HR: 66 (08-14-19 @ 12:35)  BP: 164/70 (08-14-19 @ 12:35)  Wt(kg): -- 74.6      08-14    127<L>  |  86<L>  |  65<H>  ----------------------------<  103<H>  4.6   |  24  |  7.17<H>    Ca    8.5      14 Aug 2019 06:07  Phos  5.5     08-14  Mg     2.4     08-14    TPro  7.1  /  Alb  3.9  /  TBili  0.7  /  DBili  x   /  AST  89<H>  /  ALT  41  /  AlkPhos  121<H>  08-13    Continue dialysis:   Dialyzer:   160    QB: 400  K bath: 2  Na bath: 130  Goal UF:   3.0 - 3.5    L   over       240        min  Patient is tolerating the procedure well.   Continue full treatment as prescribed.

## 2019-08-14 NOTE — PROGRESS NOTE ADULT - PROBLEM SELECTOR PROBLEM 6
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

## 2019-08-14 NOTE — PROGRESS NOTE ADULT - SUBJECTIVE AND OBJECTIVE BOX
EPS Progress Note    S: laying in bed comfortably.  No lightheadedness.    O: T(C): 36.6 (08-14-19 @ 09:17), Max: 37.2 (08-13-19 @ 18:00)  HR: 58 (08-14-19 @ 11:01) (58 - 86)  BP: 132/59 (08-14-19 @ 11:01) (116/54 - 200/75)  RR: 17 (08-14-19 @ 11:01) (16 - 28)  SpO2: 97% (08-14-19 @ 11:01) (96% - 100%)      TELE:  60s    PHYSICAL  General:  NAD        Chest:  CTA B/L, no w/r/r  Cardiac:  RRR, + s1/s2  Abdomen:   soft   Extremities: No edema  Skin: no rash noted, normal color and pigmentation  Psych: A&Ox3, lacking insight into medical condition   Neuro: no deficit noted     LABS:                        9.5    7.23  )-----------( 157      ( 14 Aug 2019 06:07 )             28.6   127<L>  |  86<L>  |  65<H>  ----------------------------<  103<H>  4.6   |  24  |  7.17<H>    Ca    8.5        Phos  5.5  Mg     2.4     TPro  7.1  /  Alb  3.9  /  TBili  0.7  /  DBili  x   /  AST  89<H>  /  ALT  41  /  AlkPhos  121<H>  PT: 13.9 sec;   INR: 1.22       PTT:35.9 sec      MEDICATIONS:  apixaban 2.5 milliGRAM(s) Oral two times a day  carvedilol 6.25 milliGRAM(s) Oral every 12 hours      ASSESSMENT/PLAN  84 year old female with ESRD, HTN, gout, COPD, who came through the ER this am with SOB and lightheadedness in the setting of missed dialysis found to have junctional bradycardia on EKG.   Conduction abnormality likely cause of conduction disorder and now with NSR and atrial fibrillation. She did have a 4 second conversion pause which she was asymptomatic from. She is agreeable to a loop recorder implant.  Please call [phone] the day prior to discharge and we can put the loop in yusuf day or the morning of discharge.  Will continue to follow tele

## 2019-08-15 ENCOUNTER — TRANSCRIPTION ENCOUNTER (OUTPATIENT)
Age: 84
End: 2019-08-15

## 2019-08-15 VITALS
SYSTOLIC BLOOD PRESSURE: 196 MMHG | OXYGEN SATURATION: 99 % | DIASTOLIC BLOOD PRESSURE: 68 MMHG | HEART RATE: 63 BPM | TEMPERATURE: 98 F | RESPIRATION RATE: 18 BRPM

## 2019-08-15 PROBLEM — N18.6 END STAGE RENAL DISEASE: Chronic | Status: ACTIVE | Noted: 2019-08-13

## 2019-08-15 LAB
ALBUMIN SERPL ELPH-MCNC: 3.5 G/DL — SIGNIFICANT CHANGE UP (ref 3.3–5)
ALP SERPL-CCNC: 110 U/L — SIGNIFICANT CHANGE UP (ref 40–120)
ALT FLD-CCNC: 39 U/L — SIGNIFICANT CHANGE UP (ref 10–45)
ANION GAP SERPL CALC-SCNC: 15 MMOL/L — SIGNIFICANT CHANGE UP (ref 5–17)
AST SERPL-CCNC: 49 U/L — HIGH (ref 10–40)
BILIRUB SERPL-MCNC: 0.7 MG/DL — SIGNIFICANT CHANGE UP (ref 0.2–1.2)
BUN SERPL-MCNC: 32 MG/DL — HIGH (ref 7–23)
CALCIUM SERPL-MCNC: 8.6 MG/DL — SIGNIFICANT CHANGE UP (ref 8.4–10.5)
CHLORIDE SERPL-SCNC: 89 MMOL/L — LOW (ref 96–108)
CO2 SERPL-SCNC: 26 MMOL/L — SIGNIFICANT CHANGE UP (ref 22–31)
CREAT SERPL-MCNC: 4.43 MG/DL — HIGH (ref 0.5–1.3)
GLUCOSE SERPL-MCNC: 105 MG/DL — HIGH (ref 70–99)
HCT VFR BLD CALC: 31.4 % — LOW (ref 34.5–45)
HGB BLD-MCNC: 10.2 G/DL — LOW (ref 11.5–15.5)
MAGNESIUM SERPL-MCNC: 2.1 MG/DL — SIGNIFICANT CHANGE UP (ref 1.6–2.6)
MCHC RBC-ENTMCNC: 32.5 GM/DL — SIGNIFICANT CHANGE UP (ref 32–36)
MCHC RBC-ENTMCNC: 32.5 PG — SIGNIFICANT CHANGE UP (ref 27–34)
MCV RBC AUTO: 100 FL — SIGNIFICANT CHANGE UP (ref 80–100)
NRBC # BLD: 0 /100 WBCS — SIGNIFICANT CHANGE UP (ref 0–0)
PHOSPHATE SERPL-MCNC: 4.8 MG/DL — HIGH (ref 2.5–4.5)
PLATELET # BLD AUTO: 158 K/UL — SIGNIFICANT CHANGE UP (ref 150–400)
POTASSIUM SERPL-MCNC: 3.6 MMOL/L — SIGNIFICANT CHANGE UP (ref 3.5–5.3)
POTASSIUM SERPL-SCNC: 3.6 MMOL/L — SIGNIFICANT CHANGE UP (ref 3.5–5.3)
PROT SERPL-MCNC: 6.7 G/DL — SIGNIFICANT CHANGE UP (ref 6–8.3)
RBC # BLD: 3.14 M/UL — LOW (ref 3.8–5.2)
RBC # FLD: 13.4 % — SIGNIFICANT CHANGE UP (ref 10.3–14.5)
SODIUM SERPL-SCNC: 130 MMOL/L — LOW (ref 135–145)
WBC # BLD: 7.15 K/UL — SIGNIFICANT CHANGE UP (ref 3.8–10.5)
WBC # FLD AUTO: 7.15 K/UL — SIGNIFICANT CHANGE UP (ref 3.8–10.5)

## 2019-08-15 PROCEDURE — 99232 SBSQ HOSP IP/OBS MODERATE 35: CPT | Mod: GC

## 2019-08-15 PROCEDURE — 33285 INSJ SUBQ CAR RHYTHM MNTR: CPT

## 2019-08-15 PROCEDURE — 99239 HOSP IP/OBS DSCHRG MGMT >30: CPT

## 2019-08-15 RX ORDER — SEVELAMER CARBONATE 2400 MG/1
800 POWDER, FOR SUSPENSION ORAL
Refills: 0 | Status: DISCONTINUED | OUTPATIENT
Start: 2019-08-15 | End: 2019-08-15

## 2019-08-15 RX ORDER — SIMVASTATIN 20 MG/1
20 TABLET, FILM COATED ORAL AT BEDTIME
Refills: 0 | Status: DISCONTINUED | OUTPATIENT
Start: 2019-08-15 | End: 2019-08-15

## 2019-08-15 RX ORDER — CALCITRIOL 0.5 UG/1
0.25 CAPSULE ORAL DAILY
Refills: 0 | Status: DISCONTINUED | OUTPATIENT
Start: 2019-08-15 | End: 2019-08-15

## 2019-08-15 RX ORDER — MECLIZINE HCL 12.5 MG
1 TABLET ORAL
Qty: 20 | Refills: 0
Start: 2019-08-15

## 2019-08-15 RX ORDER — CHOLECALCIFEROL (VITAMIN D3) 125 MCG
1000 CAPSULE ORAL DAILY
Refills: 0 | Status: DISCONTINUED | OUTPATIENT
Start: 2019-08-15 | End: 2019-08-15

## 2019-08-15 RX ORDER — FOLIC ACID 0.8 MG
1 TABLET ORAL DAILY
Refills: 0 | Status: DISCONTINUED | OUTPATIENT
Start: 2019-08-15 | End: 2019-08-15

## 2019-08-15 RX ADMIN — APIXABAN 2.5 MILLIGRAM(S): 2.5 TABLET, FILM COATED ORAL at 07:13

## 2019-08-15 RX ADMIN — APIXABAN 2.5 MILLIGRAM(S): 2.5 TABLET, FILM COATED ORAL at 18:26

## 2019-08-15 RX ADMIN — Medication 1 MILLIGRAM(S): at 12:35

## 2019-08-15 RX ADMIN — CALCITRIOL 0.25 MICROGRAM(S): 0.5 CAPSULE ORAL at 12:30

## 2019-08-15 RX ADMIN — Medication 1000 UNIT(S): at 12:30

## 2019-08-15 RX ADMIN — CARVEDILOL PHOSPHATE 6.25 MILLIGRAM(S): 80 CAPSULE, EXTENDED RELEASE ORAL at 07:13

## 2019-08-15 RX ADMIN — SEVELAMER CARBONATE 800 MILLIGRAM(S): 2400 POWDER, FOR SUSPENSION ORAL at 12:31

## 2019-08-15 RX ADMIN — CARVEDILOL PHOSPHATE 6.25 MILLIGRAM(S): 80 CAPSULE, EXTENDED RELEASE ORAL at 18:26

## 2019-08-15 RX ADMIN — SEVELAMER CARBONATE 800 MILLIGRAM(S): 2400 POWDER, FOR SUSPENSION ORAL at 09:45

## 2019-08-15 NOTE — PHYSICAL THERAPY INITIAL EVALUATION ADULT - ADDITIONAL COMMENTS
Patient lives with her two sisters in an elevator apartment. Prior to admission, patient was independent for all functional mobility and ADLs without assistive device. Denies history of falls. Denies home health assistance

## 2019-08-15 NOTE — DISCHARGE NOTE PROVIDER - PROVIDER TOKENS
FREE:[LAST:[Helen],FIRST:[Aquiles],PHONE:[phone]],FAX:[(   )    -],ADDRESS:[Aquiles Cervantes (MD)  Internal Medicine; Nephrology  [address] [place] [address]  Phone: [phone]  Fax: [phone]  Follow Up Time:   Hemodialysis: August 16th  Appointment: August 29th at 11 AM]],FREE:[LAST:[Madai],FIRST:[Yasmeen],PHONE:[(   )    -],FAX:[(   )    -],ADDRESS:[Yasmeen Aj)  Cardiac Electrophysiology; Cardiovascular Disease; Internal Medicine  [address] [place] [address]  Phone: [phone]  Fax: [phone]  Follow Up Time:   September 4th at 11 AM]]

## 2019-08-15 NOTE — PHYSICAL THERAPY INITIAL EVALUATION ADULT - GENERAL OBSERVATIONS, REHAB EVAL
Received semi-Estrada's position in bed with +bed alarm, +hep lock, on room air, in no apparent distress. Patient appears to be resting comfortably in bed

## 2019-08-15 NOTE — PROGRESS NOTE ADULT - SUBJECTIVE AND OBJECTIVE BOX
INTERVAL EVENTS: Patient seen and examined at bedside this morning; found resting comfortably in bed with friend at bedside. No acute overnight events or acute complaints. She denies CP/palpitations/SOB/dizziness and is anxious to "go home soon."    PAST MEDICAL & SURGICAL HISTORY:  ESRD on dialysis  Gout  HLD (hyperlipidemia)  HTN (hypertension)  CKD (chronic kidney disease)  AV fistula: LUE    Home Medications:  calcitriol 0.25 mcg oral capsule: 1 cap(s) orally Monday, Wednesday, and Friday (08 Jan 2019 16:10)  D3 1000 oral tablet: 2 tab(s) orally once a day (08 Jan 2019 16:10)  Emla 2.5%-2.5% topical cream: Apply topically to affected area Monday, Wednesday, and Friday (08 Jan 2019 16:10)  folic acid 1 mg oral tablet: 1 tab(s) orally once a day (08 Jan 2019 16:10)  Mircera 50 mcg/0.3 mL injectable solution: 1 each injectable once a month (08 Jan 2019 16:10)  multivitamin: 1 tab(s) orally once a day (08 Jan 2019 16:10)  Renvela 800 mg oral tablet: 1 tab(s) orally 3 times a day (with meals) (08 Jan 2019 16:10)  simvastatin 20 mg oral tablet: 1 tab(s) orally once a day (at bedtime) (08 Jan 2019 16:10)  Venofer 20 mg/mL intravenous solution: 1 each intravenous prn (08 Jan 2019 16:10)    MEDICATIONS  (STANDING):  apixaban 2.5 milliGRAM(s) Oral two times a day  calcitriol   Capsule 0.25 MICROGram(s) Oral daily  carvedilol 6.25 milliGRAM(s) Oral every 12 hours  cholecalciferol 1000 Unit(s) Oral daily  folic acid 1 milliGRAM(s) Oral daily  sevelamer carbonate 800 milliGRAM(s) Oral three times a day with meals  simvastatin 20 milliGRAM(s) Oral at bedtime    MEDICATIONS  (PRN):    Vital Signs Last 24 Hrs  T(C): 36.9 (15 Aug 2019 08:55), Max: 36.9 (15 Aug 2019 08:55)  T(F): 98.4 (15 Aug 2019 08:55), Max: 98.4 (15 Aug 2019 08:55)  HR: 65 (15 Aug 2019 08:55) (63 - 90)  BP: 167/76 (15 Aug 2019 08:55) (113/60 - 167/76)  BP(mean): 87 (14 Aug 2019 16:35) (79 - 100)  RR: 19 (15 Aug 2019 08:55) (17 - 20)  SpO2: 98% (15 Aug 2019 08:55) (96% - 99%)    PHYSICAL EXAM:  GEN: Awake, alert. NAD.   HEENT: No JVD.  RESP: CTA b/l, no rales  CV: RRR. Normal S1/S2. No m/r/g. No S3.  ABD: Soft. NT/ND. BS+  EXT: Warm. No edema, clubbing, or cyanosis.   NEURO: AAOx3. No focal deficits.     LABS:                      10.2   7.15  )-----------( 158      ( 15 Aug 2019 05:45 )             31.4     08-15    130<L>  |  89<L>  |  32<H>  ----------------------------<  105<H>  3.6   |  26  |  4.43<H>    Ca    8.6      15 Aug 2019 05:45  Phos  4.8     08-15  Mg     2.1     08-15    TPro  6.7  /  Alb  3.5  /  TBili  0.7  /  DBili  x   /  AST  49<H>  /  ALT  39  /  AlkPhos  110  08-15    I&O's Summary    14 Aug 2019 07:01  -  15 Aug 2019 07:00  --------------------------------------------------------  IN: 0 mL / OUT: 3500 mL / NET: -3500 mL    TELEMETRY: patient not on telemetry monitoring  EKG: no new EKG for review

## 2019-08-15 NOTE — PROGRESS NOTE ADULT - ATTENDING COMMENTS
seen on HD  agree with above  tolerating rx - feels well  cont HD as above-- low Na bath. UF as tolerated  recheck Na this pm -- goal in pm around 130
seen on HD with Dr Morgan, agree with above   tolerating rx -- feeling better   cont rx as above  recheck chem after HD (post 2-3 hours)
agree with above  will do loop recorder prior to discharge  obtain carotid US  continue current meds  telemetry monitoring for 48 hours then can be taken off monitor
Lytes sig improved   arrhythmia and sx resolved  next HD tomorrow   [place] need to comply with HD treatments   also discussesd free water restricition
arrhythmia appears resolved  sx resolved   Na appropriately improved  HD again today for UF and clearance   low Na dialysate again - goal Na today (in pm)  130
This patient was already seen and management decisions were made in the MICU and the patient is being transferred to the step down unit for continued medical management.
Patient was seen and examined with the resident team today.  I agree with Dr. Stanley's assessment and plan with the following exceptions/additions:     Briefly, this is an 85yo woman with a PMH of HTN c/b nephrosclerosis, now ESRD on HD ([facility] AVF, M/W/F), c/b a hx of missed HD, HLD, Afib (on Eliquis), COPD and chronic but intermittent vertigo who initially presented w/dizziness x 1d in the setting of 2 missed HD sessions (2/2 dizziness) and subsequently found to have symptomatic hyperkalemia with bradycardia.  Of note, during my conversation there were inconsistencies in her reporting c/f some underlying cognitive impairment v poor health literacy.  Initial presentation also c/f cardiac pauses and various metabolic derangements (other than hyperK), principally hyponatremia, consistent with ESRD and missed HD.     -- HD today (Wednesday)  -- monitor post-HD BMP for hyponatremia as overcorrected in 7LA; goal Na 130 today  -- clarify with Renal is should restart Losartan given her HD non-adherence and risk for precipitating hyperkalemia  -- repeat EKG  -- will need loop recorder upon discharge  -- will need to coordinate outpatient supervision given aforementioned suspicions; may need additional services at home   -- would also prefer conversations be performed with family at bedside and with   -- can discuss utility of meclizine on days she is hesitant to attend HD 2/2 vertigo  -- c/w Eliquis for Afib  -- DVT PPx - Eliquis  -- Dispo - pending above    Melisa Marks  [phone]

## 2019-08-15 NOTE — CONSULT NOTE ADULT - SUBJECTIVE AND OBJECTIVE BOX
Patient is a 84y old  Female who presents with a chief complaint of Dizziness (15 Aug 2019 09:12)       HPI:  Ms. Plaza is a 85yo F with pmh ESRD (dialysis MWF), hld, htn, COPD, paroxysmal afib (on eliquis, reports compliance) who presented to the ED with dizziness for one day. Pt reports that she feels dizzy about twice a day, sometimes when standing abruptly or when walking. One day ago she also had SOB. Pt has been on dialysis for 5 months (followed by [facility]), last dialyzed on Friday. Patient skipped her Monday session because of feeling dizzy  was not feeling well. In ED was bradycardic with HR of 41. K 6.1, Na 119. Received emergent dialysis in ED. Patient has had prior admission for same scenario in Jan.    Denies chest pain, fevers, abdominal pain, dyuria/hematuria, bowel constipation/incontinence. (13 Aug 2019 11:30)      PAST MEDICAL & SURGICAL HISTORY:  ESRD on dialysis  Gout  HLD (hyperlipidemia)  HTN (hypertension)  CKD (chronic kidney disease)  AV fistula: LUE      MEDICATIONS  (STANDING):  apixaban 2.5 milliGRAM(s) Oral two times a day  calcitriol   Capsule 0.25 MICROGram(s) Oral daily  carvedilol 6.25 milliGRAM(s) Oral every 12 hours  cholecalciferol 1000 Unit(s) Oral daily  folic acid 1 milliGRAM(s) Oral daily  sevelamer carbonate 800 milliGRAM(s) Oral three times a day with meals  simvastatin 20 milliGRAM(s) Oral at bedtime    MEDICATIONS  (PRN):      Social History per patient:             -  (- )  tobacco,  (- )   IVDA,  (- )  iliicit drug use,  (- )  alcohol           - Occupation:  x           - Home Living Status: lives with her sister in an elevator accessible apartment building           - Home Care Services:  none    Functional Level Prior to Admission per patient:                     - ADL's:  independent           - ambulates with a rollator    FAMILY HISTORY:  No pertinent family history in first degree relatives      CBC Full  -  ( 15 Aug 2019 05:45 )  WBC Count : 7.15 K/uL  RBC Count : 3.14 M/uL  Hemoglobin : 10.2 g/dL  Hematocrit : 31.4 %  Platelet Count - Automated : 158 K/uL  Mean Cell Volume : 100.0 fl  Mean Cell Hemoglobin : 32.5 pg  Mean Cell Hemoglobin Concentration : 32.5 gm/dL  Auto Neutrophil # : x  Auto Lymphocyte # : x  Auto Monocyte # : x  Auto Eosinophil # : x  Auto Basophil # : x  Auto Neutrophil % : x  Auto Lymphocyte % : x  Auto Monocyte % : x  Auto Eosinophil % : x  Auto Basophil % : x      08-15    130<L>  |  89<L>  |  32<H>  ----------------------------<  105<H>  3.6   |  26  |  4.43<H>    Ca    8.6      15 Aug 2019 05:45  Phos  4.8     08-15  Mg     2.1     08-15    TPro  6.7  /  Alb  3.5  /  TBili  0.7  /  DBili  x   /  AST  49<H>  /  ALT  39  /  AlkPhos  110  08-15            Radiology:    < from: Xray Chest 1 View-PORTABLE IMMEDIATE (08.13.19 @ 09:11) >  EXAM:  XR CHEST PORTABLE IMMED 1V                          PROCEDURE DATE:  08/13/2019          INTERPRETATION:  Clinical history/reason for exam: Shortness of breath.    Single frontal view.    Comparison: January 10, 2019.    Findings/  impression: Stable cardiomegaly, thoracic aortic calcification. Lungs and   mediastinum, unremarkable. Stable bony structures.            Vital Signs Last 24 Hrs  T(C): 36.9 (15 Aug 2019 08:55), Max: 36.9 (15 Aug 2019 08:55)  T(F): 98.4 (15 Aug 2019 08:55), Max: 98.4 (15 Aug 2019 08:55)  HR: 65 (15 Aug 2019 08:55) (58 - 90)  BP: 167/76 (15 Aug 2019 08:55) (113/60 - 167/76)  BP(mean): 87 (14 Aug 2019 16:35) (79 - 100)  RR: 19 (15 Aug 2019 08:55) (17 - 20)  SpO2: 98% (15 Aug 2019 08:55) (96% - 99%)    REVIEW OF SYSTEMS:    CONSTITUTIONAL:  fatigue  EYES: No eye pain, visual disturbances, or discharge  ENMT:  No difficulty hearing, tinnitus, vertigo; No sinus or throat pain  NECK: No pain or stiffness  BREASTS: No pain, masses, or nipple discharge  RESPIRATORY: No cough, wheezing, chills or hemoptysis; No shortness of breath  CARDIOVASCULAR: No chest pain, palpitations, dizziness, or leg swelling  GASTROINTESTINAL: No abdominal or epigastric pain. No nausea, vomiting, or hematemesis; No diarrhea or constipation. No melena or hematochezia.  GENITOURINARY: No dysuria, frequency, hematuria, or incontinence  NEUROLOGICAL: No headaches, memory loss, loss of strength, numbness, or tremors  SKIN: No itching, burning, rashes, or lesions   LYMPH NODES: No enlarged glands  ENDOCRINE: No heat or cold intolerance; No hair loss  MUSCULOSKELETAL: No joint pain or swelling; No muscle, back, or extremity pain  PSYCHIATRIC: No depression, anxiety, mood swings, or difficulty sleeping  HEME/LYMPH: No easy bruising, or bleeding gums  ALLERGY AND IMMUNOLOGIC: No hives or eczema  VASCULAR: no swelling, erythema      Physical Exam: 83 yo  woman lying in semi Estrada's position, c/o feeling tired    Head: normocephalic, atraumatic    Eyes: PERRLA, EOMI, no nystagmus, sclera anicteric    ENT: nasal discharge, uvula midline, no oropharyngeal erythema/exudate    Neck: supple, negative JVD, negative carotid bruits, no thyromegaly    Chest: CTA bilaterally, neg wheeze/ rhonchi/ rales/ crackles/ egophany    Cardiovascular: regular rate and rhythm, neg murmurs/rubs/gallops    Abdomen: soft, non distended, non tender, negative rebound/guarding, normal bowel sounds, neg hepatosplenomegaly    Extremities: L UE AVF with palpable thrill, 1 + LE edema, negative calf tenderness to palpation, negative Alberto's sign    :     Neurologic Exam:    Alert and oriented to person, place, date/year, speech fluent w/o dysarthria, recent and remote memory intact, repetition intact, comprehension intact    Cranial Nerves:     II:                       pupils equal, round and reactive to light, visual fields intact   III/ IV/VI:             extraocular movements intact, neg nystagmus, neg ptosis  V:                       facial sensation intact, V1-3 normal  VII:                     face symmetric, no droop, normal eye closure and smile  VIII:                    hearing intact to finger rub bilaterally  IX/ X:                 soft palate rise symmetrical  XI:                      head turning, shoulder shrug normal  XII:                     tongue midline    Motor Exam:    Upper Extremities:     RIght:     no focal weakness               negative drift    Left :     no focal weakness               negative drift    Lower Extremities:                 Right:      no focal weakness                 Left:       no focal weakness               Sensory:    intact to LT/PP in all UE/LE dermatomes    DTR:            = biceps/     triceps/     brachioradialis                      = patella/   medial hamstring/ankle                      neg clonus                      neg Babinski                          Gait:  not tested        PM&R Impression:    1) deconditioned  2) no focal weakness  3) ESRD        Recommendations:    1) Physical therapy focusing on therapeutic exercises, bed mobility/transfer out of bed evaluation, progressive ambulation with assistive devices prn.    2) Anticipated Disposition Plan/Recs: d/c home, +/- home PT

## 2019-08-15 NOTE — PROGRESS NOTE ADULT - SUBJECTIVE AND OBJECTIVE BOX
Patient seen and evaluated at bedside.   No acute events overnight.  Patient feels well.  Last HD on 8/14 with UF 3.5 L as prescribed.  Plan for discharge.          Meds:    apixaban 2.5 milliGRAM(s) Oral two times a day  calcitriol   Capsule 0.25 MICROGram(s) Oral daily  carvedilol 6.25 milliGRAM(s) Oral every 12 hours  cholecalciferol 1000 Unit(s) Oral daily  folic acid 1 milliGRAM(s) Oral daily  sevelamer carbonate 800 milliGRAM(s) Oral three times a day with meals  simvastatin 20 milliGRAM(s) Oral at bedtime      T(C): 36.9 (08-15-19 @ 08:55), Max: 36.9 (08-15-19 @ 08:55)  HR: 65 (08-15-19 @ 08:55) (58 - 90)  BP: 167/76 (08-15-19 @ 08:55) (113/60 - 167/76)  RR: 19 (08-15-19 @ 08:55) (17 - 20)  SpO2: 98% (08-15-19 @ 08:55) (96% - 99%)    Input/Output      08-14-19 @ 07:01  -  08-15-19 @ 07:00  --------------------------------------------------------  IN: 0 mL / OUT: 3500 mL / NET: -3500 mL            ROS:   Gen: no fever  Cardiovascular: no chest pain  Respiratory: no cough, no sputum  Gastrointestinal: no nausea, no vomiting, no change in bowel habits  Neurologic: no headache  : no urinary symptoms        PHYSICAL EXAM:  GENERAL: well-developed, well nourished, alert, no acute distress at present  NECK: supple, No JVD  CHEST/LUNG: equal air entry, CAT bilateral  HEART: normal S1/S2; RRR; no M/R/G  ABDOMEN: Soft, Nontender, +BS, No flank tenderness  EXTREMITIES: no LE edema bilateral, no calf tenderness bilateral  Neurology: AAOx3, no focal neurological deficit  SKIN: No rashes  ACCESS: LUE AVF, good thrill and bruit appreciated          LABS:                                     10.2   7.15  )-----------( 158      ( 15 Aug 2019 05:45 )             31.4       08-15    130<L>  |  89<L>  |  32<H>  ----------------------------<  105<H>  3.6   |  26  |  4.43<H>    Ca    8.6      15 Aug 2019 05:45  Phos  4.8     08-15  Mg     2.1     08-15    TPro  6.7  /  Alb  3.5  /  TBili  0.7  /  DBili  x   /  AST  49<H>  /  ALT  39  /  AlkPhos  110  08-15                              RADIOLOGY & ADDITIONAL STUDIES: Patient seen and evaluated at bedside.   No acute events overnight.  Patient feels well.  Last HD on 8/14 with UF 3.5 L as prescribed.  Plan for discharge.          Meds:    apixaban 2.5 milliGRAM(s) Oral two times a day  calcitriol   Capsule 0.25 MICROGram(s) Oral daily  carvedilol 6.25 milliGRAM(s) Oral every 12 hours  cholecalciferol 1000 Unit(s) Oral daily  folic acid 1 milliGRAM(s) Oral daily  sevelamer carbonate 800 milliGRAM(s) Oral three times a day with meals  simvastatin 20 milliGRAM(s) Oral at bedtime      T(C): 36.9 (08-15-19 @ 08:55), Max: 36.9 (08-15-19 @ 08:55)  HR: 65 (08-15-19 @ 08:55) (58 - 90)  BP: 167/76 (08-15-19 @ 08:55) (113/60 - 167/76)  RR: 19 (08-15-19 @ 08:55) (17 - 20)  SpO2: 98% (08-15-19 @ 08:55) (96% - 99%)    Input/Output      08-14-19 @ 07:01  -  08-15-19 @ 07:00  --------------------------------------------------------  IN: 0 mL / OUT: 3500 mL / NET: -3500 mL            ROS:   Gen: no fever  Cardiovascular: no chest pain  Respiratory: no cough, no sputum  Gastrointestinal: no nausea, no vomiting, no change in bowel habits  Neurologic: no headache,no dizziness  : no urinary symptoms        PHYSICAL EXAM:  GENERAL: well-developed, well nourished, alert, no acute distress at present  NECK: supple, No JVD  CHEST/LUNG: equal air entry, CAT bilateral  HEART: normal S1/S2; RRR; no M/R/G  ABDOMEN: Soft, Nontender, +BS, No flank tenderness  EXTREMITIES: no LE edema bilateral, no calf tenderness bilateral  Neurology: AAOx3, no focal neurological deficit  SKIN: No rashes  ACCESS: LUE AVF, good thrill and bruit appreciated          LABS:                                     10.2   7.15  )-----------( 158      ( 15 Aug 2019 05:45 )             31.4       08-15    130<L>  |  89<L>  |  32<H>  ----------------------------<  105<H>  3.6   |  26  |  4.43<H>    Ca    8.6      15 Aug 2019 05:45  Phos  4.8     08-15  Mg     2.1     08-15    TPro  6.7  /  Alb  3.5  /  TBili  0.7  /  DBili  x   /  AST  49<H>  /  ALT  39  /  AlkPhos  110  08-15                              RADIOLOGY & ADDITIONAL STUDIES:

## 2019-08-15 NOTE — CONSULT NOTE ADULT - ASSESSMENT
85 yo F with pmh ESRD (dialysis MWF), hld, htn, COPD, paroxysmal afib (on eliquis, reports compliance) who presented to the ED with dizziness x1d. Pt skipped last HD session as she was not feeling well. In ED pt bradycardic with HR 41. Labs pertinent for K 6.1, Na 119. Received emergent dialysis in ED admitted to ICU for further monitoring and now stepped down to 7L as labs have begun to normalize.      Problem/Plan - 1:  ·  Problem: Chronic kidney disease with end stage renal failure on dialysis.  Plan: MWF dialysis. Pt missed last two sessions. Presented with dizziness and electrolyte abnormalities, hyperkalemia, hyponatremia, hyperphosphatemia, volume overload. S/p urgent HD on 8/13. Resolved  - s/p emergent dialysis, calcium gluconate x2, insulin and dextrose, albuterol nebs  - F/u BMP qd closely.     Problem/Plan - 2:  ·  Problem: Hyponatremia.  Plan: # 2/2 hypervolemic hyponatremia in setting of missed dialysis. Mentating at baseline throughout hospital course. Na on arrival 119 with improvement to 121 at noon, up to 128 at 6p concern for rate of increase so started on D5 40 cc/h. 127 on 8/14 morning. s/p emergent dialysis, initial Na @119  - 200cc of D5W given overnight for Na overcorrection (128 from 119 in <24 hr)  - plan for HD today with 130 Na bath  - monitor BMP Q 4/hr, goal of Na today is 130   - minimize acute change in sodium to <8 in 24h  - daily BMPs  - f/u nephro recs  - recheck Na this pm -- goal in pm around 130.     Problem/Plan - 3:  ·  Problem: Hypertension, unspecified type.  Plan: #Hypertensive Emergency: 2/2 missed hemodialysis session  - s/p emergent dialysis  - BP normotensive at present, hold home meds for now  - f/u TTE.     Problem/Plan - 4:  ·  Problem: Junctional (hayes) bradycardia.  Plan: #Junctional Bradycardia:   - Cardiology consulted: recommend TTE  - Resolved, current HR 70's   - hold coreg  - EP recommending Loop recorder before discharge.     Problem/Plan - 5:  ·  Problem: Chronic atrial fibrillation.  Plan: #Paroxysmal afib - compliant with eliquis  - AC: restarted eliquis 2.5 bid  - Rate: rate controlled with episodes of bradycardia, holding home coreg for now.     Problem/Plan - 6:  Problem: Hyperlipidemia, unspecified hyperlipidemia type. Plan: #Hx of HLD  - Continue home Simvastatin 20mg.    Problem/Plan - 7:  ·  Problem: Nutrition, metabolism, and development symptoms.  Plan: Diet: Renal  Activity: As tolerated  F: None  E: Replete per nephro  N: Renal diet.     Problem/Plan - 8:  ·  Problem: Prophylactic measure.  Plan: DVT PPx: Eliquis 5mg BID  GI PPx: None  DISPO: RMF.

## 2019-08-15 NOTE — DISCHARGE NOTE PROVIDER - NSDCCPCAREPLAN_GEN_ALL_CORE_FT
PRINCIPAL DISCHARGE DIAGNOSIS  Diagnosis: Hyperkalemia  Assessment and Plan of Treatment: You were diagnosed with symptomatic hyperkalemia (high potassium) with Junctional (hayes) bradycardia and bigeminy after missing your hemodialysis session. You came in with similar symptoms and diagosis in January of this year. You received two emergent sessions of dialysis in the hospital with resolution of the symptoms. High potassium is a very serious condition that can lead to cardiac arrhythmia which can be life threatening. If you feel dizzy, you can take a single dose of the meclizine which we have sent to your pharmacy. It is very important not to miss your hemodialysis sessions. The cardiac electrophysilogist is recommending the placement of a loop recorder to monitor for the abnormal rhythm that brought you to the hospital. Please follow up with within two weeks after discharge. Please follow up with your nephrologist and/or Dr. Cervantes within two weeks of discharge. If you have any symptoms of lightheadness, severe headache, or severe palpitations, please seek immediate medical attention.      SECONDARY DISCHARGE DIAGNOSES  Diagnosis: ESRD on dialysis  Assessment and Plan of Treatment: You have chronic kidney disease with end stage renal failure that requires  dialysis MWF. You missed your last two sessions. Presented with dizziness and electrolyte abnormalities, hyperkalemia, hyponatremia, hyperphosphatemia, volume overload. You received two emergent sessions of dialysis in the hospital. Please follow up with your nephrologist and/or Dr. Cervantes within two weeks of discharge. If you have any symptoms of lightheadness, severe headache, or severe palpitations, please seek immediate medical attention.    Diagnosis: Hyponatremia  Assessment and Plan of Treatment: You developed hyponatremia (low sodium) in setting of missed dialysis sesions. You were asymptomatic, the neprhology team worked with the medicine team to help correct the low sodium slowly without complication. Please follow up with your nephrologist and/or Dr. Cervantes within two weeks of discharge. If you have any symptoms of lightheadness, severe headache, or severe palpitations, please seek immediate medical attention. PRINCIPAL DISCHARGE DIAGNOSIS  Diagnosis: Hyperkalemia  Assessment and Plan of Treatment: You were diagnosed with symptomatic hyperkalemia (high potassium) with Junctional (hayes) bradycardia and bigeminy after missing your hemodialysis session. You came in with similar symptoms and diagosis in January of this year. You received two emergent sessions of dialysis in the hospital with resolution of the symptoms. High potassium is a very serious condition that can lead to cardiac arrhythmia which can be life threatening. If you feel dizzy, you can take a single dose of the meclizine which we have sent to your pharmacy. It is very important not to miss your hemodialysis sessions. The cardiac electrophysilogist is recommending the placement of a loop recorder to monitor for the abnormal rhythm that brought you to the hospital. Please follow up with within two weeks after discharge. Please follow up with your nephrologist and/or Dr. Cervantes within two weeks of discharge. If you have any symptoms of lightheadness, severe headache, or severe palpitations, please seek immediate medical attention.      SECONDARY DISCHARGE DIAGNOSES  Diagnosis: HTN (hypertension)  Assessment and Plan of Treatment: You have high blood pressure that was elevated because of the missed dialysis. You received emergent dialysis and blood pressure medications. You should follow up with your nephrologist and PMD within two weeks of discharge.    Diagnosis: ESRD on dialysis  Assessment and Plan of Treatment: You have chronic kidney disease with end stage renal failure that requires  dialysis MWF. You missed your last two sessions. Presented with dizziness and electrolyte abnormalities, hyperkalemia, hyponatremia, hyperphosphatemia, volume overload. You received two emergent sessions of dialysis in the hospital. Please follow up with your nephrologist and/or Dr. Cervantes within two weeks of discharge. If you have any symptoms of lightheadness, severe headache, or severe palpitations, please seek immediate medical attention.    Diagnosis: Hyponatremia  Assessment and Plan of Treatment: You developed hyponatremia (low sodium) in setting of missed dialysis sesions. You were asymptomatic, the neprhology team worked with the medicine team to help correct the low sodium slowly without complication. Please follow up with your nephrologist and/or Dr. Cervantes within two weeks of discharge. If you have any symptoms of lightheadness, severe headache, or severe palpitations, please seek immediate medical attention. PRINCIPAL DISCHARGE DIAGNOSIS  Diagnosis: Hyperkalemia  Assessment and Plan of Treatment: You were diagnosed with symptomatic hyperkalemia (high potassium) with Junctional (hayes) bradycardia and bigeminy after missing your hemodialysis session. You came in with similar symptoms and diagosis in January of this year. You received two emergent sessions of dialysis in the hospital with resolution of the symptoms. High potassium is a very serious condition that can lead to cardiac arrhythmia which can be life threatening. If you feel dizzy, you can take a single dose of the Meclizine 25mg daily as needed for dizziness, which we have sent to your pharmacy. It is very important not to miss your hemodialysis sessions. The cardiac electrophysilogist is recommending the placement of a loop recorder to monitor for the abnormal rhythm that brought you to the hospital. Please follow up with within two weeks after discharge. Please follow up with your Dr. Cervantes at dialysis on 8/16 and at an appt in his offic on 8/29. If you have any symptoms of lightheadness, severe headache, or severe palpitations, please seek immediate medical attention.      SECONDARY DISCHARGE DIAGNOSES  Diagnosis: HTN (hypertension)  Assessment and Plan of Treatment: You have high blood pressure that was elevated because of the missed dialysis. You received emergent dialysis and blood pressure medications. You should follow up with your nephrologist and PMD within two weeks of discharge.    Diagnosis: ESRD on dialysis  Assessment and Plan of Treatment: You have chronic kidney disease with end stage renal failure that requires  dialysis MWF. You missed your last two sessions. Presented with dizziness and electrolyte abnormalities, hyperkalemia, hyponatremia, hyperphosphatemia, volume overload. You received two emergent sessions of dialysis in the hospital. Please follow up with your normal HD MWF and appointments with Dr. Cervantes within two weeks of discharge. If you have any symptoms of lightheadness, severe headache, or severe palpitations, please seek immediate medical attention.    Diagnosis: Hyponatremia  Assessment and Plan of Treatment: You developed hyponatremia (low sodium) in setting of missed dialysis sesions. You were asymptomatic, the neprhology team worked with the medicine team to help correct the low sodium slowly without complication. Please follow up with your normal HD MWF and appointments with Dr. Cervantes within two weeks of discharge. If you have any symptoms of lightheadness, severe headache, or severe palpitations, please seek immediate medical attention. PRINCIPAL DISCHARGE DIAGNOSIS  Diagnosis: Hyperkalemia  Assessment and Plan of Treatment: You were diagnosed with symptomatic hyperkalemia (high potassium) with Junctional (hayes) bradycardia and bigeminy after missing your hemodialysis session. You came in with similar symptoms and diagosis in January of this year. You received two emergent sessions of dialysis in the hospital with resolution of the symptoms. High potassium is a very serious condition that can lead to cardiac arrhythmia which can be life threatening. If you feel dizzy, you can take a single dose of the Meclizine 25mg daily as needed for dizziness, which we have sent to your pharmacy. It is very important not to miss your hemodialysis sessions. The cardiac electrophysilogist is recommending the placement of a loop recorder to monitor for the abnormal rhythm that brought you to the hospital. Please follow up with within two weeks after discharge. Please follow up with your Dr. Cervantes at dialysis on 8/16 and at an appt in his offic on 8/29. If you have any symptoms of lightheadness, severe headache, or severe palpitations, please seek immediate medical attention.      SECONDARY DISCHARGE DIAGNOSES  Diagnosis: HTN (hypertension)  Assessment and Plan of Treatment: You have high blood pressure that was elevated because of the missed dialysis. You received emergent dialysis and blood pressure medications. Please continue your home blood pressure medications as prescribed. You should follow up with your nephrologist and PMD within two weeks of discharge.    Diagnosis: ESRD on dialysis  Assessment and Plan of Treatment: You have chronic kidney disease with end stage renal failure that requires  dialysis MWF. You missed your last two sessions. Presented with dizziness and electrolyte abnormalities, hyperkalemia, hyponatremia, hyperphosphatemia, volume overload. You received two emergent sessions of dialysis in the hospital. Please follow up with your normal HD MWF and appointments with Dr. Cervantes within two weeks of discharge. If you have any symptoms of lightheadness, severe headache, or severe palpitations, please seek immediate medical attention.    Diagnosis: Hyponatremia  Assessment and Plan of Treatment: You developed hyponatremia (low sodium) in setting of missed dialysis sesions. You were asymptomatic, the neprhology team worked with the medicine team to help correct the low sodium slowly without complication. Please follow up with your normal HD MWF and appointments with Dr. Cervantes within two weeks of discharge. If you have any symptoms of lightheadness, severe headache, or severe palpitations, please seek immediate medical attention. PRINCIPAL DISCHARGE DIAGNOSIS  Diagnosis: Hyperkalemia  Assessment and Plan of Treatment: You were diagnosed with symptomatic hyperkalemia (high potassium) with Junctional (hayes) bradycardia and bigeminy after missing your hemodialysis session. You came in with similar symptoms and diagosis in January of this year. You received two emergent sessions of dialysis in the hospital with resolution of the symptoms. High potassium is a very serious condition that can lead to cardiac arrhythmia which can be life threatening. If you feel dizzy, you can take a single dose of the Meclizine 25mg daily as needed for dizziness, which we have sent to your pharmacy. IT IS VERY IMPORTANT NOT TO MISS YOUR DIALYSIS AND YOU MUST CONTACT DR. WELLS.   The cardiac electrophysilogist is recommending the placement of a loop recorder to monitor for the abnormal rhythm that brought you to the hospital. Please follow up with within two weeks after discharge. Please follow up with your Dr. Wells at dialysis on 8/16 and at an appt in his offic on 8/29. If you have any symptoms of lightheadness, severe headache, or severe palpitations, please seek immediate medical attention.      SECONDARY DISCHARGE DIAGNOSES  Diagnosis: HTN (hypertension)  Assessment and Plan of Treatment: You have high blood pressure that was elevated because of the missed dialysis. You received emergent dialysis and blood pressure medications. Please continue your home blood pressure medications as prescribed. You should follow up with your nephrologist and PMD within two weeks of discharge.    Diagnosis: ESRD on dialysis  Assessment and Plan of Treatment: You have chronic kidney disease with end stage renal failure that requires  dialysis MWF. You missed your last two sessions. Presented with dizziness and electrolyte abnormalities, hyperkalemia, hyponatremia, hyperphosphatemia, volume overload. You received two emergent sessions of dialysis in the hospital. IT IS VERY IMPORTANT NOT TO MISS YOUR DIALYSIS SESSIONS. Please follow up with your normal HD MWF and appointments with Dr. Wells within two weeks of discharge. If you have any symptoms of lightheadness, severe headache, or severe palpitations, please seek immediate medical attention.    Diagnosis: Hyponatremia  Assessment and Plan of Treatment: You developed hyponatremia (low sodium) in setting of missed dialysis sesions. You were asymptomatic, the neprhology team worked with the medicine team to help correct the low sodium slowly without complication. Please follow up with your normal HD MWF and appointments with Dr. Wells within two weeks of discharge. If you have any symptoms of lightheadness, severe headache, or severe palpitations, please seek immediate medical attention. PRINCIPAL DISCHARGE DIAGNOSIS  Diagnosis: Hyperkalemia  Assessment and Plan of Treatment: You were diagnosed with symptomatic hyperkalemia (high potassium) with Junctional (hayes) bradycardia and bigeminy after missing your hemodialysis session. You came in with similar symptoms and diagosis in January of this year. You received two emergent sessions of dialysis in the hospital with resolution of the symptoms. High potassium is a very serious condition that can lead to cardiac arrhythmia which can be life threatening. If you feel dizzy, you can take a single dose of the Meclizine 25mg daily as needed for dizziness, which we have sent to your pharmacy. You were evaluated by physical therapy who recommended home PT, but you refused and you were amenable to pursuing outpatient physical therapy which we have written a script for.  IT IS VERY IMPORTANT NOT TO MISS YOUR DIALYSIS AND YOU MUST CONTACT DR. WELLS.  The cardiac electrophysilogist is recommending the placement of a loop recorder to monitor for the abnormal rhythm that brought you to the hospital. Please follow up with within two weeks after discharge. Please follow up with your Dr. Wells at dialysis on 8/16 and at an appt in his offic on 8/29. If you have any symptoms of lightheadness, severe headache, or severe palpitations, please seek immediate medical attention.      SECONDARY DISCHARGE DIAGNOSES  Diagnosis: HTN (hypertension)  Assessment and Plan of Treatment: You have high blood pressure that was elevated because of the missed dialysis. You received emergent dialysis and blood pressure medications. Please continue your home blood pressure medications as prescribed. You should follow up with your nephrologist and PMD within two weeks of discharge.    Diagnosis: ESRD on dialysis  Assessment and Plan of Treatment: You have chronic kidney disease with end stage renal failure that requires  dialysis MWF. You missed your last two sessions. Presented with dizziness and electrolyte abnormalities, hyperkalemia, hyponatremia, hyperphosphatemia, volume overload. You received two emergent sessions of dialysis in the hospital. IT IS VERY IMPORTANT NOT TO MISS YOUR DIALYSIS SESSIONS. Please follow up with your normal HD MWF and appointments with Dr. Wells within two weeks of discharge. If you have any symptoms of lightheadness, severe headache, or severe palpitations, please seek immediate medical attention.    Diagnosis: Hyponatremia  Assessment and Plan of Treatment: You developed hyponatremia (low sodium) in setting of missed dialysis sesions. You were asymptomatic, the neprhology team worked with the medicine team to help correct the low sodium slowly without complication. Please follow up with your normal HD MWF and appointments with Dr. Wells within two weeks of discharge. If you have any symptoms of lightheadness, severe headache, or severe palpitations, please seek immediate medical attention.

## 2019-08-15 NOTE — DISCHARGE NOTE PROVIDER - CARE PROVIDER_API CALL
Aquiles Cervantes Allen M (MD)  Internal Medicine; Nephrology  [address] [place] [id number]  Phone: [phone]  Fax: [phone]  Follow Up Time:   Hemodialysis: August 16th  Appointment: August 29th at 11 AM  Phone: [phone]  Fax: (   )    -  Follow Up Time:     Yasmeen Aj Kabir (MD)  Cardiac Electrophysiology; Cardiovascular Disease; Internal Medicine  [address] [place] [address]  Phone: [phone]  Fax: [phone]  Follow Up Time:   September 4th at 11 AM  Phone: (   )    -  Fax: (   )    -  Follow Up Time:

## 2019-08-15 NOTE — DISCHARGE NOTE PROVIDER - HOSPITAL COURSE
Patient is __ yo M/F with past medical history of _____    Presented with _____, found to have _____    Problem List/Main Diagnoses (system-based):     Inpatient treatment course:     New medications:     Labs to be followed outpatient:     Exam to be followed outpatient:         85 yo F with pmh ESRD (dialysis MWF), hld, htn, COPD, paroxysmal afib (on eliquis, reports compliance) who presented to the ED with dizziness x1d. Pt skipped last HD session as she was not feeling well. In ED pt bradycardic with HR 41. Labs pertinent for K 6.1, Na 119. Received emergent dialysis in ED admitted to ICU for further monitoring and now stepped down to 7L as labs have begun to normalize. 85 yo F with pmh ESRD (dialysis MWF), hld, htn, COPD, paroxysmal afib (on eliquis, reports compliance) who presented to the ED with dizziness x1d. Patient skipped last HD session as she was not feeling well. In ED, she was bradycardic with HR 41. Labs pertinent for K 6.1, Na 119. Received emergent dialysis in ED admitted to ICU for further monitoring and now stepped down to tele to floors as electrolytes began to normalize. She was admitted back in January for the exact same diagnosis of symptomatic hyperkalemia causing a junctional rhythm and bigeminy after missing  hemodialysis.        Problem List/Inpatient treatment course:     # Symptomatic hyperkalemia with Junctional (hayes) bradycardia and bigeminy.    - Hyperkalemia 2/2 missed HD, see below    - Cardiology consulted: recommend TTE    - Resolved, current HR 70's     - hold coreg    - EP recommending Loop recorder follow up with EP outpatient        # Chronic kidney disease with end stage renal failure on dialysis.      MWF dialysis. Pt missed last two sessions. Presented with dizziness and electrolyte abnormalities, hyperkalemia, hyponatremia, hyperphosphatemia, volume overload. S/p urgent HD on 8/13. Resolved.    - In ED, s/p emergent dialysis, calcium gluconate x2, insulin and dextrose, albuterol nebs    - Received dialysis once more on the floor    - Continued renal medications        # Hyponatremia.      2/2 hypervolemic hyponatremia in setting of missed dialysis. Mentating at baseline throughout hospital course. Na on arrival 119 with improvement to 121 at noon, up to 128 at 6p concern for rate of increase so started on D5 40 cc/h. 127 on 8/14 morning. s/p emergent dialysis, initial Na @119    - HD with 130 Na bath    - monitor BMP Q 4/hr, goal of Na today is 130     - Na improved to 130        # Hypertension    - s/p emergent dialysis    - BP normotensive at present, hold home meds for now    - Discontinued losartan, continued amlodpine to 5mg, will need to follow up with PMD or nephrologist outpatient within two weeks    - f/u TTE.         # Chronic atrial fibrillation.      Paroxysmal afib - compliant with eliquis    - AC: restarted eliquis 2.5 bid    - Rate: rate controlled with episodes of bradycardia, holding home coreg for now.         # Hyperlipidemia, unspecified hyperlipidemia type. Plan: #Hx of HLD    - Continue home Simvastatin 20mg.        New medications: Amlodipine 5mg daily    Labs to be followed outpatient: None    Exam to be followed outpatient: Dialysis MWF. Follow up with nephrology, electrophysiology, and PMD 83 yo F with pmh ESRD (dialysis MWF), hld, htn, COPD, paroxysmal afib (on eliquis, reports compliance) who presented to the ED with dizziness x1d. Patient skipped last HD session as she was not feeling well. In ED, she was bradycardic with HR 41. Labs pertinent for K 6.1, Na 119. Received emergent dialysis in ED admitted to ICU for further monitoring and now stepped down to tele to floors as electrolytes began to normalize. She was admitted back in January for the exact same diagnosis of symptomatic hyperkalemia causing a junctional rhythm and bigeminy after missing  hemodialysis.        Problem List/Inpatient treatment course:     # Symptomatic hyperkalemia with Junctional (hayes) bradycardia and bigeminy.    - Hyperkalemia 2/2 missed HD, see below    - Cardiology consulted: recommend TTE    - Resolved, current HR 70's     - hold coreg    - EP recommending Loop recorder follow up with EP outpatient        # Chronic kidney disease with end stage renal failure on dialysis.      MWF dialysis. Pt missed last two sessions. Presented with dizziness and electrolyte abnormalities, hyperkalemia, hyponatremia, hyperphosphatemia, volume overload. S/p urgent HD on 8/13. Resolved.    - In ED, s/p emergent dialysis, calcium gluconate x2, insulin and dextrose, albuterol nebs    - Received dialysis once more on the floor    - Continued renal medications        # Hyponatremia.      2/2 hypervolemic hyponatremia in setting of missed dialysis. Mentating at baseline throughout hospital course. Na on arrival 119 with improvement to 121 at noon, up to 128 at 6p concern for rate of increase so started on D5 40 cc/h. 127 on 8/14 morning. s/p emergent dialysis, initial Na @119    - HD with 130 Na bath    - monitor BMP Q 4/hr, goal of Na today is 130     - Na improved to 130        # Hypertension    - s/p emergent dialysis    - BP normotensive at present, hold home meds for now    - Discontinued losartan, continued amlodpine to 5mg, will need to follow up with PMD or nephrologist outpatient within two weeks    - f/u TTE.     - Spoke with primary nephrologist, he endorsed that the patient is not compliant with hemodialysis or fluid management and that the problem is a volume status that can be fixed with extra dialysis sessions which the patient is refusing. He suggests to continue losartan and coreg again outpatient.        # Chronic atrial fibrillation.      Paroxysmal afib - compliant with eliquis    - AC: restarted eliquis 2.5 bid    - Rate: rate controlled with episodes of bradycardia, holding home coreg for now.         # Hyperlipidemia, unspecified hyperlipidemia type. Plan: #Hx of HLD    - Continue home Simvastatin 20mg.        New medications: Amlodipine 5mg daily    Labs to be followed outpatient: None    Exam to be followed outpatient: Dialysis MWF. Follow up with nephrology, electrophysiology, and PMD 85 yo F with pmh ESRD (dialysis MWF), hld, htn, COPD, paroxysmal afib (on eliquis, reports compliance) who presented to the ED with dizziness x1d. Patient skipped last HD session as she was not feeling well. In ED, she was bradycardic with HR 41. Labs pertinent for K 6.1, Na 119. Received emergent dialysis in ED admitted to ICU for further monitoring and now stepped down to tele to floors as electrolytes began to normalize. She was admitted back in January for the exact same diagnosis of symptomatic hyperkalemia causing a junctional rhythm and bigeminy after missing  hemodialysis.        Problem List/Inpatient treatment course:     1. Symptomatic hyperkalemia with Junctional (hayes) bradycardia and bigeminy. EP recommending Loop recorder follow up with EP outpatient. s/p HD x3. In ED, s/p emergent dialysis, calcium gluconate x2, insulin and dextrose, albuterol nebs        # Chronic kidney disease with end stage renal failure on dialysis. S/p urgent x3 HD on 8/13. Will follow up with dialysis outpaitent tomorrow 8/16.        # Hyponatremia: Na on arrival 119 with improvement to 130 on discharge        # Hypertension, s/p dialysis x3. Continue home losartan and coreg,  will need to follow up with PMD or nephrologist outpatient within two weeks. Spoke with primary nephrologist, he endorsed that the patient is not compliant with hemodialysis or fluid management and that the problem is a volume status that can be fixed with extra dialysis sessions which the patient is refusing. He is aware and suggests to continue losartan and coreg again outpatient with dialysis tomorrow.        # Chronic atrial fibrillation. Paroxysmal afib - compliant with eliquis AC: restarted eliquis 2.5 bid        # Hyperlipidemia, Continue home Simvastatin 20mg.        New medications: Meclizine 25mg daily as needed for dizziness    Labs to be followed outpatient: None    Exam to be followed outpatient: Dialysis MWF. Follow up with nephrology, electrophysiology, and PMD

## 2019-08-15 NOTE — DISCHARGE NOTE PROVIDER - NSDCFUSCHEDAPPT_GEN_ALL_CORE_FT
SUMIT SUTTON ; 09/04/2019 ; [facility] [address] SUMIT SUTTON ; 09/04/2019 ; [facility] [address]  SUMIT SUTTON ; 09/16/2019 ; [facility] [address] SUMTI SUTTON ; 09/04/2019 ; [facility] [address]  SUMIT SUTTON ; 09/16/2019 ; [facility] [address]

## 2019-08-15 NOTE — PROGRESS NOTE ADULT - ASSESSMENT
84 yo F w PMHx of ESRD on HD (MWF), nephrosclerosis, HTN, gout, COPD, OA, who presents with 3 days of dizziness and 1 day of SOB after missing dialysis on 8/9 and 8/12; found to have severe electrolyte abnormalities and severe bradycardia.  Nephrology consult placed in regards to ESRD on HD, for hyperkalemia requiring urgent hemodialysis; hyponatremia.    # ESRD on HD MWF via LUE AVF  - not compliant with outpatient HD, fluid restriction, diet  - s/p urgent HD on 8/13 due to severe symptomatic hyperkalemia, hyponatremia  - last HD on 8/14 with UF 3.5 L as prescribed  - volume status and electrolytes noted, acceptable  - next HD on 8/16  - daily weights  - renal diet  - strict I/O    # Hyperkalemia, resolved  - Rx received in ED: calcium gluconate, sodium bicarb, insulin with dextrose, furosemide, albuterol  - EKG: severe junctional bradycardia, resolved  - s/p urgent HD on 8/13  - EP recommended ILR for episode of Afib with a 3 second conversion pause during HD  - K 3.6 today    # Severe hyponatremia, likely chronic due to hypervolemia  - initial Na @119  - 200cc of D5W given overnight for Na overcorrection (128 from 119 in <24 hr)  - Na 130 today    # HTN  - continue Coreg 6.25 mg po Q12hr  - UF with HD  - add Amlodipine 5 mg po qd    # Renal bone disease  - continue renvela, calcitriol  - phos/pth/vit D as outpatient    # Anemia  - Hb 10.2 g/dl, stable

## 2019-08-15 NOTE — PROGRESS NOTE ADULT - ASSESSMENT
84 year old woman, w/ a PMHx of ESRD (on HD M/W/F), HLD, and HTN presenting to ED with complaints of SOB x1 day s/p missed HD; Cardiology service consulted for bradycardia, and patient w/ HR of 41 on telemetry monitor at the time of examination.    #AFib  Patient w/ prior hx of AFlutter now in Atrial Fibrillation s/o HD. Rate controlled today w/ Carvedilol. Patient has likely underlying conduction issues.  - dicharge patient on the following:     -> apixaban 2.5 milliGRAM(s) Oral two times a day     -> carvedilol 6.25 milliGRAM(s) Oral every 12 hours  - patient will f/u w/ EP (Dr. Aj) regarding loop recorder implantation to further elucidate patient's c/o pre-HD dizziness (possibly arrhythmia-related)    #Bradycardia  Likely 2/2 electrolyte abnormalities in the setting of missed HD. Now resolved s/p HD.  - monitor VS  - f/u Nephrology    Case to be discussed with attending; recommendations are not final until attested by attending. 84 year old woman, w/ a PMHx of ESRD (on HD M/W/F), HLD, and HTN presenting to ED with complaints of SOB x1 day s/p missed HD; Cardiology service consulted for bradycardia, and patient w/ HR of 41 on telemetry monitor at the time of examination.    #AFib  Patient w/ prior hx of AFlutter now in Atrial Fibrillation s/o HD. Rate controlled today w/ Carvedilol. Patient has likely underlying conduction issues.  - discharge patient on the following:     -> apixaban 2.5 milliGRAM(s) Oral two times a day     -> carvedilol 6.25 milliGRAM(s) Oral every 12 hours  - patient will f/u w/ EP (Dr. Aj) regarding loop recorder implantation to further elucidate patient's c/o pre-HD dizziness (possibly arrhythmia-related)    #Bradycardia  Likely 2/2 electrolyte abnormalities in the setting of missed HD. Now resolved s/p HD.  - monitor VS  - f/u Nephrology    Case to be discussed with attending; recommendations are not final until attested by attending.

## 2019-08-15 NOTE — PHYSICAL THERAPY INITIAL EVALUATION ADULT - GAIT DEVIATIONS NOTED, PT EVAL
increased trunk flexion/ increased thoracic kyphosis/decreased fifi/decreased step length/increased time in double stance

## 2019-08-15 NOTE — PHYSICAL THERAPY INITIAL EVALUATION ADULT - PERTINENT HX OF CURRENT PROBLEM, REHAB EVAL
Patient is an 84 year old F with PMH of ESRD (dialysis MWF), hld, htn, COPD, paroxysmal A-fib (on Eliquis, reports compliance) who presented to the ED with dizziness for one day.

## 2019-08-15 NOTE — DISCHARGE NOTE NURSING/CASE MANAGEMENT/SOCIAL WORK - NSDCDPATPORTLINK_GEN_ALL_CORE
You can access the [facility] Portal, offered by [facility], by registering with the following website: [email or web address]

## 2019-08-15 NOTE — PHYSICAL THERAPY INITIAL EVALUATION ADULT - CRITERIA FOR SKILLED THERAPEUTIC INTERVENTIONS
anticipated discharge recommendation/risk reduction/prevention/impairments found/rehab potential/functional limitations in following categories/therapy frequency

## 2019-08-30 DIAGNOSIS — M10.9 GOUT, UNSPECIFIED: ICD-10-CM

## 2019-08-30 DIAGNOSIS — I12.0 HYPERTENSIVE CHRONIC KIDNEY DISEASE WITH STAGE 5 CHRONIC KIDNEY DISEASE OR END STAGE RENAL DISEASE: ICD-10-CM

## 2019-08-30 DIAGNOSIS — Z79.01 LONG TERM (CURRENT) USE OF ANTICOAGULANTS: ICD-10-CM

## 2019-08-30 DIAGNOSIS — R00.1 BRADYCARDIA, UNSPECIFIED: ICD-10-CM

## 2019-08-30 DIAGNOSIS — E87.1 HYPO-OSMOLALITY AND HYPONATREMIA: ICD-10-CM

## 2019-08-30 DIAGNOSIS — E87.5 HYPERKALEMIA: ICD-10-CM

## 2019-08-30 DIAGNOSIS — Z99.2 DEPENDENCE ON RENAL DIALYSIS: ICD-10-CM

## 2019-08-30 DIAGNOSIS — D64.9 ANEMIA, UNSPECIFIED: ICD-10-CM

## 2019-08-30 DIAGNOSIS — M19.91 PRIMARY OSTEOARTHRITIS, UNSPECIFIED SITE: ICD-10-CM

## 2019-08-30 DIAGNOSIS — J44.9 CHRONIC OBSTRUCTIVE PULMONARY DISEASE, UNSPECIFIED: ICD-10-CM

## 2019-08-30 DIAGNOSIS — E83.39 OTHER DISORDERS OF PHOSPHORUS METABOLISM: ICD-10-CM

## 2019-08-30 DIAGNOSIS — Z91.15 PATIENT'S NONCOMPLIANCE WITH RENAL DIALYSIS: ICD-10-CM

## 2019-08-30 DIAGNOSIS — I16.1 HYPERTENSIVE EMERGENCY: ICD-10-CM

## 2019-08-30 DIAGNOSIS — I48.0 PAROXYSMAL ATRIAL FIBRILLATION: ICD-10-CM

## 2019-08-30 DIAGNOSIS — E78.5 HYPERLIPIDEMIA, UNSPECIFIED: ICD-10-CM

## 2019-08-30 DIAGNOSIS — N25.0 RENAL OSTEODYSTROPHY: ICD-10-CM

## 2019-08-30 DIAGNOSIS — N18.6 END STAGE RENAL DISEASE: ICD-10-CM

## 2019-09-04 ENCOUNTER — APPOINTMENT (OUTPATIENT)
Dept: HEART AND VASCULAR | Facility: CLINIC | Age: 84
End: 2019-09-04
Payer: MEDICARE

## 2019-09-04 VITALS
HEART RATE: 73 BPM | SYSTOLIC BLOOD PRESSURE: 145 MMHG | BODY MASS INDEX: 31.49 KG/M2 | WEIGHT: 150 LBS | DIASTOLIC BLOOD PRESSURE: 66 MMHG | HEIGHT: 58 IN

## 2019-09-04 PROCEDURE — 93290 INTERROG DEV EVAL ICPMS IP: CPT

## 2019-09-05 NOTE — DISCUSSION/SUMMARY
[FreeTextEntry1] : Ms. Plaza is an 84 year-old female with sinus arrest in the setting of hyperkalemia (missed HD session) and paroxysmal atrial fibrillation.  She has had no further episodes of sinus arrest or symptomatic bradycardia.  She has had a high burden of rate controlled AF that appears to by asymptomatic.   I have asked her to remain on eliquis for stroke prophylaxis and coreg for rate control.  She will return in 4 months for routine care.

## 2019-09-05 NOTE — HISTORY OF PRESENT ILLNESS
[de-identified] : Ms. Plaza is an 84 year-old female with ESRD/HD, HLD, HTN, COPD, and paroxysmal afib (on \par eliquis) who presented to the [facility] ED with dizziness was found to be junctional rhythm due to sinus arrest in setting of hyperkalemia due to missed hemodialysis session.  She received emergent dialysis with a return in normal sinus rhythm.  She underwent loop recorder implantation to evaluate for recurrent sinus node dysfunction as well as to evaluate her AF burden.  She reports no discomfort over the device site.  In addition, she denies any dizziness or other symptoms since discharge.  \par

## 2019-09-05 NOTE — PROCEDURE
[de-identified] : [employer] Linq\par 86% AF burden with adequate rate contrl\par No bradycardic events

## 2019-09-16 ENCOUNTER — APPOINTMENT (OUTPATIENT)
Dept: HEART AND VASCULAR | Facility: CLINIC | Age: 84
End: 2019-09-16

## 2019-10-01 PROCEDURE — 82550 ASSAY OF CK (CPK): CPT

## 2019-10-01 PROCEDURE — 85027 COMPLETE CBC AUTOMATED: CPT

## 2019-10-01 PROCEDURE — 96374 THER/PROPH/DIAG INJ IV PUSH: CPT

## 2019-10-01 PROCEDURE — 90935 HEMODIALYSIS ONE EVALUATION: CPT

## 2019-10-01 PROCEDURE — C1764: CPT

## 2019-10-01 PROCEDURE — 94640 AIRWAY INHALATION TREATMENT: CPT

## 2019-10-01 PROCEDURE — 84100 ASSAY OF PHOSPHORUS: CPT

## 2019-10-01 PROCEDURE — 85025 COMPLETE CBC W/AUTO DIFF WBC: CPT

## 2019-10-01 PROCEDURE — 96376 TX/PRO/DX INJ SAME DRUG ADON: CPT

## 2019-10-01 PROCEDURE — 83735 ASSAY OF MAGNESIUM: CPT

## 2019-10-01 PROCEDURE — 82962 GLUCOSE BLOOD TEST: CPT

## 2019-10-01 PROCEDURE — 82553 CREATINE MB FRACTION: CPT

## 2019-10-01 PROCEDURE — 85610 PROTHROMBIN TIME: CPT

## 2019-10-01 PROCEDURE — 80053 COMPREHEN METABOLIC PANEL: CPT

## 2019-10-01 PROCEDURE — 99285 EMERGENCY DEPT VISIT HI MDM: CPT | Mod: 25

## 2019-10-01 PROCEDURE — 84132 ASSAY OF SERUM POTASSIUM: CPT

## 2019-10-01 PROCEDURE — 84295 ASSAY OF SERUM SODIUM: CPT

## 2019-10-01 PROCEDURE — 36415 COLL VENOUS BLD VENIPUNCTURE: CPT

## 2019-10-01 PROCEDURE — 84484 ASSAY OF TROPONIN QUANT: CPT

## 2019-10-01 PROCEDURE — 93005 ELECTROCARDIOGRAM TRACING: CPT

## 2019-10-01 PROCEDURE — 85730 THROMBOPLASTIN TIME PARTIAL: CPT

## 2019-10-01 PROCEDURE — 96375 TX/PRO/DX INJ NEW DRUG ADDON: CPT

## 2019-10-01 PROCEDURE — 80048 BASIC METABOLIC PNL TOTAL CA: CPT

## 2019-10-01 PROCEDURE — 71045 X-RAY EXAM CHEST 1 VIEW: CPT

## 2019-10-01 PROCEDURE — 83880 ASSAY OF NATRIURETIC PEPTIDE: CPT

## 2020-02-01 ENCOUNTER — OUTPATIENT (OUTPATIENT)
Dept: OUTPATIENT SERVICES | Facility: HOSPITAL | Age: 85
LOS: 1 days | End: 2020-02-01
Payer: MEDICARE

## 2020-02-01 DIAGNOSIS — I77.0 ARTERIOVENOUS FISTULA, ACQUIRED: Chronic | ICD-10-CM

## 2020-02-01 PROCEDURE — G9001: CPT

## 2020-02-14 ENCOUNTER — INPATIENT (INPATIENT)
Facility: HOSPITAL | Age: 85
LOS: 0 days | Discharge: HOME | DRG: 252 | End: 2020-02-15
Attending: SURGERY | Admitting: SURGERY
Payer: COMMERCIAL

## 2020-02-14 VITALS
SYSTOLIC BLOOD PRESSURE: 178 MMHG | OXYGEN SATURATION: 99 % | TEMPERATURE: 98 F | DIASTOLIC BLOOD PRESSURE: 65 MMHG | WEIGHT: 154.98 LBS | RESPIRATION RATE: 19 BRPM | HEART RATE: 62 BPM | HEIGHT: 62 IN

## 2020-02-14 DIAGNOSIS — I77.0 ARTERIOVENOUS FISTULA, ACQUIRED: Chronic | ICD-10-CM

## 2020-02-14 LAB
ANION GAP SERPL CALC-SCNC: 17 MMOL/L — SIGNIFICANT CHANGE UP (ref 5–17)
APTT BLD: 37.1 SEC — HIGH (ref 27.5–36.3)
BASOPHILS # BLD AUTO: 0.06 K/UL — SIGNIFICANT CHANGE UP (ref 0–0.2)
BASOPHILS NFR BLD AUTO: 1.1 % — SIGNIFICANT CHANGE UP (ref 0–2)
BLD GP AB SCN SERPL QL: NEGATIVE — SIGNIFICANT CHANGE UP
BUN SERPL-MCNC: 59 MG/DL — HIGH (ref 7–23)
CALCIUM SERPL-MCNC: 8.9 MG/DL — SIGNIFICANT CHANGE UP (ref 8.4–10.5)
CHLORIDE SERPL-SCNC: 88 MMOL/L — LOW (ref 96–108)
CO2 SERPL-SCNC: 21 MMOL/L — LOW (ref 22–31)
CREAT SERPL-MCNC: 6.55 MG/DL — HIGH (ref 0.5–1.3)
EOSINOPHIL # BLD AUTO: 0.18 K/UL — SIGNIFICANT CHANGE UP (ref 0–0.5)
EOSINOPHIL NFR BLD AUTO: 3.2 % — SIGNIFICANT CHANGE UP (ref 0–6)
GLUCOSE SERPL-MCNC: 150 MG/DL — HIGH (ref 70–99)
HCT VFR BLD CALC: 35.5 % — SIGNIFICANT CHANGE UP (ref 34.5–45)
HGB BLD-MCNC: 11.2 G/DL — LOW (ref 11.5–15.5)
IMM GRANULOCYTES NFR BLD AUTO: 0.2 % — SIGNIFICANT CHANGE UP (ref 0–1.5)
INR BLD: 1.37 — HIGH (ref 0.88–1.16)
LYMPHOCYTES # BLD AUTO: 1.23 K/UL — SIGNIFICANT CHANGE UP (ref 1–3.3)
LYMPHOCYTES # BLD AUTO: 21.7 % — SIGNIFICANT CHANGE UP (ref 13–44)
MCHC RBC-ENTMCNC: 31.5 GM/DL — LOW (ref 32–36)
MCHC RBC-ENTMCNC: 32.8 PG — SIGNIFICANT CHANGE UP (ref 27–34)
MCV RBC AUTO: 104.1 FL — HIGH (ref 80–100)
MONOCYTES # BLD AUTO: 0.54 K/UL — SIGNIFICANT CHANGE UP (ref 0–0.9)
MONOCYTES NFR BLD AUTO: 9.5 % — SIGNIFICANT CHANGE UP (ref 2–14)
NEUTROPHILS # BLD AUTO: 3.64 K/UL — SIGNIFICANT CHANGE UP (ref 1.8–7.4)
NEUTROPHILS NFR BLD AUTO: 64.3 % — SIGNIFICANT CHANGE UP (ref 43–77)
NRBC # BLD: 0 /100 WBCS — SIGNIFICANT CHANGE UP (ref 0–0)
PLATELET # BLD AUTO: 174 K/UL — SIGNIFICANT CHANGE UP (ref 150–400)
POTASSIUM SERPL-MCNC: 4.5 MMOL/L — SIGNIFICANT CHANGE UP (ref 3.5–5.3)
POTASSIUM SERPL-SCNC: 4.5 MMOL/L — SIGNIFICANT CHANGE UP (ref 3.5–5.3)
PROTHROM AB SERPL-ACNC: 15.8 SEC — HIGH (ref 10–12.9)
RBC # BLD: 3.41 M/UL — LOW (ref 3.8–5.2)
RBC # FLD: 12.9 % — SIGNIFICANT CHANGE UP (ref 10.3–14.5)
RH IG SCN BLD-IMP: POSITIVE — SIGNIFICANT CHANGE UP
SODIUM SERPL-SCNC: 126 MMOL/L — LOW (ref 135–145)
WBC # BLD: 5.66 K/UL — SIGNIFICANT CHANGE UP (ref 3.8–10.5)
WBC # FLD AUTO: 5.66 K/UL — SIGNIFICANT CHANGE UP (ref 3.8–10.5)

## 2020-02-14 PROCEDURE — 36902 INTRO CATH DIALYSIS CIRCUIT: CPT | Mod: GC

## 2020-02-14 PROCEDURE — 99285 EMERGENCY DEPT VISIT HI MDM: CPT

## 2020-02-14 PROCEDURE — 93010 ELECTROCARDIOGRAM REPORT: CPT

## 2020-02-14 PROCEDURE — 99222 1ST HOSP IP/OBS MODERATE 55: CPT

## 2020-02-14 PROCEDURE — 71045 X-RAY EXAM CHEST 1 VIEW: CPT | Mod: 26

## 2020-02-14 RX ORDER — CALCITRIOL 0.5 UG/1
1 CAPSULE ORAL
Qty: 0 | Refills: 0 | DISCHARGE

## 2020-02-14 RX ORDER — SIMVASTATIN 20 MG/1
20 TABLET, FILM COATED ORAL AT BEDTIME
Refills: 0 | Status: DISCONTINUED | OUTPATIENT
Start: 2020-02-14 | End: 2020-02-15

## 2020-02-14 RX ORDER — LOSARTAN POTASSIUM 100 MG/1
50 TABLET, FILM COATED ORAL DAILY
Refills: 0 | Status: DISCONTINUED | OUTPATIENT
Start: 2020-02-14 | End: 2020-02-15

## 2020-02-14 RX ORDER — CARVEDILOL PHOSPHATE 80 MG/1
12.5 CAPSULE, EXTENDED RELEASE ORAL EVERY 12 HOURS
Refills: 0 | Status: DISCONTINUED | OUTPATIENT
Start: 2020-02-14 | End: 2020-02-15

## 2020-02-14 RX ORDER — LIDOCAINE AND PRILOCAINE CREAM 25; 25 MG/G; MG/G
1 CREAM TOPICAL
Qty: 0 | Refills: 0 | DISCHARGE

## 2020-02-14 RX ORDER — APIXABAN 2.5 MG/1
2.5 TABLET, FILM COATED ORAL
Refills: 0 | Status: DISCONTINUED | OUTPATIENT
Start: 2020-02-14 | End: 2020-02-15

## 2020-02-14 RX ORDER — ONDANSETRON 8 MG/1
4 TABLET, FILM COATED ORAL EVERY 6 HOURS
Refills: 0 | Status: DISCONTINUED | OUTPATIENT
Start: 2020-02-14 | End: 2020-02-15

## 2020-02-14 RX ORDER — HYDROMORPHONE HYDROCHLORIDE 2 MG/ML
0.5 INJECTION INTRAMUSCULAR; INTRAVENOUS; SUBCUTANEOUS EVERY 6 HOURS
Refills: 0 | Status: DISCONTINUED | OUTPATIENT
Start: 2020-02-14 | End: 2020-02-14

## 2020-02-14 RX ORDER — ACETAMINOPHEN 500 MG
650 TABLET ORAL EVERY 6 HOURS
Refills: 0 | Status: DISCONTINUED | OUTPATIENT
Start: 2020-02-14 | End: 2020-02-15

## 2020-02-14 RX ORDER — FOLIC ACID 0.8 MG
1 TABLET ORAL DAILY
Refills: 0 | Status: DISCONTINUED | OUTPATIENT
Start: 2020-02-14 | End: 2020-02-15

## 2020-02-14 RX ORDER — IRON SUCROSE 20 MG/ML
1 INJECTION, SOLUTION INTRAVENOUS
Qty: 0 | Refills: 0 | DISCHARGE

## 2020-02-14 RX ORDER — SEVELAMER CARBONATE 2400 MG/1
1600 POWDER, FOR SUSPENSION ORAL
Refills: 0 | Status: DISCONTINUED | OUTPATIENT
Start: 2020-02-14 | End: 2020-02-15

## 2020-02-14 RX ORDER — CHOLECALCIFEROL (VITAMIN D3) 125 MCG
2 CAPSULE ORAL
Qty: 0 | Refills: 0 | DISCHARGE

## 2020-02-14 RX ORDER — SEVELAMER CARBONATE 2400 MG/1
2 POWDER, FOR SUSPENSION ORAL
Qty: 0 | Refills: 0 | DISCHARGE

## 2020-02-14 RX ORDER — CARVEDILOL PHOSPHATE 80 MG/1
1 CAPSULE, EXTENDED RELEASE ORAL
Qty: 0 | Refills: 0 | DISCHARGE

## 2020-02-14 RX ORDER — METHOXY POLYETHYLENE GLYCOL-EPOETIN BETA 30 UG/.3ML
1 INJECTION, SOLUTION INTRAVENOUS
Qty: 0 | Refills: 0 | DISCHARGE

## 2020-02-14 RX ORDER — FOLIC ACID 0.8 MG
1 TABLET ORAL
Qty: 0 | Refills: 0 | DISCHARGE

## 2020-02-14 RX ADMIN — CARVEDILOL PHOSPHATE 12.5 MILLIGRAM(S): 80 CAPSULE, EXTENDED RELEASE ORAL at 19:33

## 2020-02-14 RX ADMIN — APIXABAN 2.5 MILLIGRAM(S): 2.5 TABLET, FILM COATED ORAL at 23:53

## 2020-02-14 RX ADMIN — Medication 650 MILLIGRAM(S): at 21:00

## 2020-02-14 RX ADMIN — SIMVASTATIN 20 MILLIGRAM(S): 20 TABLET, FILM COATED ORAL at 23:53

## 2020-02-14 RX ADMIN — Medication 650 MILLIGRAM(S): at 20:37

## 2020-02-14 RX ADMIN — Medication 1 MILLIGRAM(S): at 19:33

## 2020-02-14 RX ADMIN — SEVELAMER CARBONATE 1600 MILLIGRAM(S): 2400 POWDER, FOR SUSPENSION ORAL at 19:33

## 2020-02-14 NOTE — ED PROVIDER NOTE - PROGRESS NOTE DETAILS
Pt discussed w Dr Cervantes; he requests Dr Goss.  Vas consulted and will eval. Ken by [facility] who want to admit pt for a fistulogram later today. Dr Cervantes updated and requests we contact [facility] for HD needs since he is leaving [facility].  Renal consulted, await their return call.

## 2020-02-14 NOTE — CONSULT NOTE ADULT - ASSESSMENT
83 yo F with ESRD on HD MWF admitted with LUE AVF bleeding.  Nephrology consulted for ESRD on HD for inpatient dialysis arrangement.      # ESRD on HD MWF via LUE AVF  - last HD on 2/12  - volume status and electrolytes noted, acceptable  - no indication for urgent dialysis at present  - plan for OR today for LUE AV fistulogram  - EDW TBD  - obtain flow sheets from outpatient unit ([address])  - daily weights  - renal diet  - strict I/O    # HTN  - resume home meds  - UF with HD    # Renal bone disease  - calcium noted  - check phos   - sevelamer 1600 mg po tid w each meal    # Anemia  - Hb 11.2 g/dl  - monitor H/H

## 2020-02-14 NOTE — BRIEF OPERATIVE NOTE - OPERATION/FINDINGS
Access via direct 6Fr sheath in Access via 6Fr sheath directly into the venous side of the AVF. Fistulogram showed moderate in-stent stenosis, no stenosis of the rest of the outflow, inflow brisk. Plasty of in-stent stenosis carried out with some improvement in flow. Access site closed with 5-0 prolene.

## 2020-02-14 NOTE — ED PROVIDER NOTE - TEMPLATE, MLM
2019       Maria Del Carmen Romero MD  [address]  [facility] [address]  VIA Facsimile: [phone]      Patient: Rasheeda Rosales   YOB: 1952   Date of Visit: 2019       Dear Dr. Romero:    Thank you for referring Rasheeda Rosales to me for evaluation. Below are my notes for this visit with her.    If you have questions, please do not hesitate to call me. I look forward to following your patient along with you.      Sincerely,        Junaid Helton MD        CC: No Recipients  Junaid Helton MD  2019  5:00 PM  Sign when Signing Visit        Patient Name: Rasheeda Rosales  MRN:[id number]  :1952    Referring Physician:   Maria Del Carmen Romero MD  [phone]        Patient ID: Rasheeda is a 67 year old female       History of Present Illness:    The patient is here for a follow-up visit today.  She continues to experience palpitations.  She describes them as \"skipped beat\" every 6 or 7 normal beats.  They are quite scary and concerning to the patient.  They will occur more at rest and resolve with activity.  She is most aware of them when she lies down to bed at night.  The patient denies any sustained palpitations, dizziness, near syncope, diaphoresis or chest pain.  She denies any excessive caffeine or alcohol intake.  She does not smoke.    Past Medical History:   Diagnosis Date   • Constipation    • Elevated CPK    • Hyperlipidemia    • Meningioma (CMS/HCC)    • PAC (premature atrial contraction)    • Palpitations    • Sleep apnea    • TIA (transient ischemic attack)     amourosis fugax r eye     Family History   Problem Relation Age of Onset   • Thyroid Mother    • Hyperlipidemia Sister    • Hypertension Sister    • Thyroid Daughter    • Hyperlipidemia Son    • Neuropathy Neg Hx    • Muscular dystrophy Neg Hx      Social History     Tobacco Use   • Smoking status: Never Smoker   • Smokeless tobacco: Never Used   Substance Use Topics   • Alcohol use: No     Frequency: Never   •  Drug use: Never     ALLERGIES:   Allergen Reactions   • Simvastatin MYALGIA     Current Outpatient Medications   Medication Sig Dispense Refill   • cholecalciferol (VITAMIN D) 25 mcg (1,000 units) tablet Take 1,000 mcg by mouth daily.     • COENZYME Q-10 PO Take 100 mg by mouth daily.     • atorvastatin (LIPITOR) 20 MG tablet 20 mg daily.      • aspirin 81 MG tablet Take 81 mg by mouth as needed.      • nadolol (CORGARD) 20 MG tablet Take 0.5 tablets by mouth daily. 15 tablet 2     No current facility-administered medications for this visit.        Review of Systems:  Review of Systems   Constitution: Negative.   HENT: Negative.    Eyes: Negative.    Cardiovascular: Positive for palpitations. Negative for chest pain, dyspnea on exertion, leg swelling, orthopnea and paroxysmal nocturnal dyspnea.   Endocrine: Negative.    Hematologic/Lymphatic: Negative.    Skin: Negative.    Musculoskeletal: Negative.    Gastrointestinal: Negative.    Genitourinary: Negative.    Neurological: Negative.    Psychiatric/Behavioral: Negative.        Physical Examination:  Visit Vitals  /68 (BP Location: LFA - Left forearm, Patient Position: Sitting)   Pulse 76   Ht 5' 10\" (1.778 m)   Wt 71.7 kg (158 lb)   BMI 22.67 kg/m²     Physical Exam   Constitutional: She appears healthy. No distress.   HENT:   Mouth/Throat: Oropharynx is clear.   Eyes: Pupils are equal, round, and reactive to light. Conjunctivae are normal.   Neck: Normal range of motion. No JVD present.   Cardiovascular: Normal rate, regular rhythm, S1 normal, S2 normal and intact distal pulses. PMI is not displaced. Exam reveals no gallop.   No murmur heard.  Pulmonary/Chest: Breath sounds normal. She has no wheezes. She has no rales.   Abdominal: Soft. Bowel sounds are normal. She exhibits no distension. There is no splenomegaly or hepatomegaly. There is no tenderness. Musculoskeletal: Normal range of motion.         General: No tenderness or edema.     Neurological: She  is alert and oriented to person, place, and time. She has intact cranial nerves.   Skin: Skin is warm and dry. No cyanosis. No pallor.       Ekg/Imaging:   Results for orders placed or performed in visit on 11/27/19   ELECTROCARDIOGRAM 12-LEAD    Impression    Normal sinus rhythm at 67/min, normal EKG.       Labs:  Recent Labs   Lab 10/04/19   CHOLESTEROL 219   HDL 91   TRIGLYCERIDE 64   CALCULATED        Recent Labs   Lab 08/19/19   Sodium 136  136   Chloride 100  100   BUN 13.8  13.8   Potassium 4.7  4.7   Glucose 94  94   Creatinine 0.7  0.7   CALCIUM 8.9  8.9       Recent Labs   Lab 08/19/19   WBC 4.5  4.5   RBC 4.38  4.38   HGB 13.1  13.1   HCT 42.7  42.7     311       No results for input(s): DBIL, TP in the last 8765 hours.    Invalid input(s): ALB, TBIL, ALKP, GOT     No results for input(s): NTPROB in the last 8765 hours.    The ASCVD Risk score (Mark MICHAEL Jr., et al., 2013) failed to calculate for the following reasons:    The patient has a prior MI or stroke diagnosis      Imaging:  No results found.  No results found for this or any previous visit.  No results found for this or any previous visit.  No results found for this or any previous visit.  No results found for this or any previous visit.      Assessment/Plan:  Chest pain  1.  I reassured the patient that her atypical chest pain is definitely not cardiac.  2.  I reviewed with her the findings of her recent treadmill stress (11/12/2019): She exercised for 9 minutes, had no symptoms at; mild ST depressions at peak exercise were due to tachycardia and resolved within less than 1 minute in recovery.  They do not indicate myocardial ischemia.    Palpitations  1.  I reassured the patient that her stress test of 11/12/2019 did not show myocardial ischemia and did not elicit any tachyarrhythmias, despite reaching 99% of predicted maximum HR.  2.  I also reassured her that her echo of 6/6/2018 showed normal LVEF, normal LA and LV  chamber size, normal valves.  3.  Prior thyroid functions were normal.  4.  The cause of her \"skipped beats\" is PACs.  The patient has no sustained tachyarrhythmias.  5.  I will start the patient on nadolol 10 mg nightly for the next 30 days to see if we can suppress most of these PACs.  The patient will monitor her BP.  6.  Follow-up visit in 3 months.    Heterozygous familial hypercholesterolemia  1.  Patient claims she did not respond to Repatha.  2.  Her elevated CPK did not change after stopping atorvastatin.  3.  The patient is currently on atorvastatin 20 mg daily; most recent LDL was 115.        Return in 3 months (on 2/27/2020).      Junaid Helton MD  11/27/2019    5:00 PM                  General

## 2020-02-14 NOTE — PROGRESS NOTE ADULT - SUBJECTIVE AND OBJECTIVE BOX
Vascular Surgery Post-Op Note    Procedure: Fistulogram     Diagnosis/Indication: bleeding from LUE AVF    Surgeon: Ana Paula    S: Pt has no complaints. Denies CP, SOB, RAMIREZ, calf tenderness. Pain controlled with medication.    O:  T(C): 36.3 (02-15-20 @ 06:06), Max: 36.3 (02-15-20 @ 06:06)  T(F): 97.4 (02-15-20 @ 06:06), Max: 97.4 (02-15-20 @ 06:06)  HR: 60 (02-15-20 @ 06:12) (60 - 60)  BP: 148/65 (02-15-20 @ 06:12) (148/65 - 148/65)  RR: 18 (02-15-20 @ 06:12) (18 - 18)  SpO2: 99% (02-15-20 @ 06:12) (99% - 99%)  Wt(kg): --                        11.2   5.66  )-----------( 174      ( 14 Feb 2020 09:02 )             35.5     02-14    126<L>  |  88<L>  |  59<H>  ----------------------------<  150<H>  4.5   |  21<L>  |  6.55<H>    Ca    8.9      14 Feb 2020 09:02        Gen: NAD, resting comfortably in bed  C/V: NSR  Pulm: Nonlabored breathing, no respiratory distress  Abd: soft, NT/ND  Extrem: dressing C/D/I good thrill      A/P: 84yFemale s/p above procedure  Diet: Regular  Pain/nausea control  DVT ppx: Eliquis  Dispo plan: home p HD in am

## 2020-02-14 NOTE — ED ADULT NURSE NOTE - CHPI ED NUR SYMPTOMS NEG
no fever/no tingling/no nausea/no chills/no weakness/no dizziness/no decreased eating/drinking/no pain/no vomiting

## 2020-02-14 NOTE — ED ADULT NURSE NOTE - NSIMPLEMENTINTERV_GEN_ALL_ED
Implemented All Fall with Harm Risk Interventions:  [place] to call system. Call bell, personal items and telephone within reach. Instruct patient to call for assistance. Room bathroom lighting operational. Non-slip footwear when patient is off stretcher. Physically safe environment: no spills, clutter or unnecessary equipment. Stretcher in lowest position, wheels locked, appropriate side rails in place. Provide visual cue, wrist band, yellow gown, etc. Monitor gait and stability. Monitor for mental status changes and reorient to person, place, and time. Review medications for side effects contributing to fall risk. Reinforce activity limits and safety measures with patient and family. Provide visual clues: red socks.

## 2020-02-14 NOTE — H&P ADULT - HISTORY OF PRESENT ILLNESS
[FreeTextEntry1] : This note was written by Susannah Ortiz on 10/28/2019 acting solely as a scribe for Dr. Emerson Sauer.\par \par All medical record entries made by the Scribe were at my, Dr. Emerson Sauer, direction and personally dictated by me on 10/28/2019. I have personally reviewed the chart and agree that the record accurately reflects my personal performance of the history, physical exam, assessment and plan.\par  85yo F with PMH HTN, HLD, afib (on Eliquis), ESRD on HD(MWF) presents with continuous oozing from LUE AVF x 2 days. Patient was last dialyzed on 2/12/20 at her dialysis center without any issues, however afterwards she continued to ooze from the needle hole in her left AV fistula. She was then referred to [facility] ED by her Nephrologist Dr. Cervantes. Patient currently on Eliquis 2.5mg BID for afib and last took her medication this morning. 85yo F with PMH HTN, HLD, afib (on Apixaban), ESRD on HD(MWF) presents with continuous oozing from LUE AVF x 2 days. Patient was last dialyzed on 2/12/20 at her dialysis center without any issues, however afterwards she continued to ooze from the needle hole in her left AV fistula. She was then referred to [facility] ED by her Nephrologist Dr. Cervantes. Patient currently on Eliquis 2.5mg BID for afib and last took her medication this morning. She denies any pain, weakness, numbness, decreased sensation or decreased motor function in her left hand. She denies Headache, blurred vision, N,V cP, SOB, diarrhea.

## 2020-02-14 NOTE — H&P ADULT - ASSESSMENT
85yo F with ESRD on HD with LUE AVF bleeding    Plan:  -Admit to Vascular Surgery  -Preop labs/EKG/CXR  -Home meds as appropriate, holding Losartan and Eliquis  -NPO  -Renal consult for HD  -Plan for OR today for LUE AV fistulogram, then HD      Plan d/w Dr. Goss and ED attending 83yo F with ESRD on HD with LUE AVF bleeding    Plan:  -Admit to Vascular Surgery  -Preop labs/EKG/CXR  -Home meds as appropriate, holding Losartan and Eliquis  -NPO  -Renal consult for HD  -Plan for OR today for LUE AV fistulogram, then HD  -Pre-oped and consented for Fistulogram       Plan d/w Dr. Goss and ED attending

## 2020-02-14 NOTE — ED PROVIDER NOTE - OBJECTIVE STATEMENT
84 year old female h/o ESRD (on HD M/W/F), HLD, HTN, afib on apixiban, gout c/o bleeding at her HD catheter.  Last HD 2 d ago. 84 year old female h/o ESRD (on HD M/W/F), HLD, HTN, afib on apixiban, gout c/o bleeding at her HD catheter.  Last HD 2 d ago.  Pt c/o bleeding from puncture site since dialysis 2 d ago.  Pt denies pain, numbness, other bleeding/bruising, cp, palpitations, sob.

## 2020-02-14 NOTE — H&P ADULT - NSICDXPASTMEDICALHX_GEN_ALL_CORE_FT
PAST MEDICAL HISTORY:  Afib     CKD (chronic kidney disease)     ESRD on dialysis     Gout     HLD (hyperlipidemia)     HTN (hypertension)

## 2020-02-14 NOTE — ED ADULT NURSE NOTE - OBJECTIVE STATEMENT
Pt CO bleeding at site of L AVF since yesterday. Dialysis pt of MWF, due to be dialyzed today. VSS. Denies dizziness, n/v, fevers/chills, pain, palpitations, SOB. Pressure dressing applied at home, redressed by Dr. Parisi.

## 2020-02-14 NOTE — CONSULT NOTE ADULT - SUBJECTIVE AND OBJECTIVE BOX
Patient is a 84y old  Female who presents with a chief complaint of Post dialysis bleeding from AVF (14 Feb 2020 11:37)    Nephrology consulted for ESRD on HD for inpatient dialysis arrangement.  HD on MWF via LUE AVF at [address]   last HD on 2/12 with oozing from AVF since then   denies any active complains at present, such as dizziness, sob, orthopnea, chest pain, n/v/d, edema  breathing comfortably on RA, sat %, not in any distress  Hb 11.2 g/dl, K 4.5, CO2 21, BUN/Cr 59/6.5    HPI:  83yo F with PMH HTN, HLD, afib (on Apixaban), ESRD on HD(MWF) presents with continuous oozing from LUE AVF x 2 days. Patient was last dialyzed on 2/12/20 at her dialysis center without any issues, however afterwards she continued to ooze from the needle hole in her left AV fistula. She was then referred to [facility] ED by her Nephrologist Dr. Cervantes. Patient currently on Eliquis 2.5mg BID for afib and last took her medication this morning. She denies any pain, weakness, numbness, decreased sensation or decreased motor function in her left hand. She denies Headache, blurred vision, N,V cP, SOB, diarrhea. (14 Feb 2020 11:01)      PAST MEDICAL & SURGICAL HISTORY:  Afib  ESRD on dialysis  Gout  HLD (hyperlipidemia)  HTN (hypertension)  CKD (chronic kidney disease)  AV fistula: LUE      Allergies    No Known Allergies    Intolerances    Norvasc (Other)      FAMILY HISTORY:  not contributory    SOCIAL HISTORY:  denies tobacco    MEDICATIONS  (STANDING):  carvedilol 12.5 milliGRAM(s) Oral every 12 hours  folic acid 1 milliGRAM(s) Oral daily  sevelamer carbonate 1600 milliGRAM(s) Oral three times a day with meals  simvastatin 20 milliGRAM(s) Oral at bedtime    MEDICATIONS  (PRN):      Vital Signs Last 24 Hrs  T(C): 36.8 (14 Feb 2020 10:34), Max: 36.8 (14 Feb 2020 10:34)  T(F): 98.2 (14 Feb 2020 10:34), Max: 98.2 (14 Feb 2020 10:34)  HR: 64 (14 Feb 2020 13:15) (56 - 69)  BP: 167/70 (14 Feb 2020 13:15) (147/68 - 186/78)  BP(mean): 101 (14 Feb 2020 13:15) (101 - 112)  RR: 18 (14 Feb 2020 13:15) (18 - 19)  SpO2: 99% (14 Feb 2020 13:15) (98% - 99%)    REVIEW OF SYSTEMS:  Gen: No fever  CVS: No chest pain  Resp: No shortness of breath  Abd: No nausea/ No vomiting/No abdominal pain  CNS: No headache      PHYSICAL EXAM:  GENERAL: well-developed, well nourished, alert, no acute distress at present  NECK: supple, No JVD  CHEST/LUNG: Clear to auscultation bilaterally  HEART: normal S1S2, RRR  ABDOMEN: Soft, Nontender, +BS, No flank tenderness  EXTREMITIES: No clubbing, cyanosis, or edema, no calf tenderness bilateral  Neurology: AAOx3, no focal neurological deficit  SKIN: No rashes  ACCESS: LUE AVF, thrill and bruit appreciated, covered with dry and clean dressing      CAPILLARY BLOOD GLUCOSE          I&O's Summary        LABS:                            11.2   5.66  )-----------( 174      ( 14 Feb 2020 09:02 )             35.5       PT/INR - ( 14 Feb 2020 09:02 )   PT: 15.8 sec;   INR: 1.37          PTT - ( 14 Feb 2020 09:02 )  PTT:37.1 sec          RADIOLOGY & ADDITIONAL TESTS:    reviewed

## 2020-02-14 NOTE — ED PROVIDER NOTE - MUSCULOSKELETAL, MLM
Spine appears normal, range of motion is not limited, no muscle or joint tenderness L forearm w 2+ radial pulse, access w small puncture site that is oozing blood, + bruit/thrill, no ttp, hand warm, cap refill < 3 s

## 2020-02-14 NOTE — BRIEF OPERATIVE NOTE - NSICDXBRIEFPOSTOP_GEN_ALL_CORE_FT
POST-OP DIAGNOSIS:  AVF (arteriovenous fistula) 14-Feb-2020 16:49:12 in-stent stenosis of avf   Ga Hopson

## 2020-02-14 NOTE — ED PROVIDER NOTE - PMH
Afib    CKD (chronic kidney disease)    ESRD on dialysis    Gout    HLD (hyperlipidemia)    HTN (hypertension)

## 2020-02-14 NOTE — H&P ADULT - NSHPPHYSICALEXAM_GEN_ALL_CORE
Gen: alert and awake, NAD  Pulm: CTA  CV: Irregular S1 S2  LUE: slow oozing from AVF puncture hole, no hematoma, no pseudoaneurysm, no surrounding erythema/edema, +thrill Gen: alert and awake, NAD  Pulm: CTA  CV: Irregular S1 S2  LUE: slow oozing from AVF puncture hole, no hematoma, no pseudoaneurysm, no surrounding erythema/edema, +thrill   left hand warm to touch. sensation intact and motor 5/5

## 2020-02-14 NOTE — PROGRESS NOTE ADULT - SUBJECTIVE AND OBJECTIVE BOX
Pre-op Diagnosis: bleeding L AVF  Procedure: L AVF fistulogram  Surgeon: Dr. Goss    Consent: obtained                          11.2   5.66  )-----------( 174      ( 14 Feb 2020 09:02 )             35.5     02-14    126<L>  |  88<L>  |  59<H>  ----------------------------<  150<H>  4.5   |  21<L>  |  6.55<H>    Ca    8.9      14 Feb 2020 09:02      PT/INR - ( 14 Feb 2020 09:02 )   PT: 15.8 sec;   INR: 1.37          PTT - ( 14 Feb 2020 09:02 )  PTT:37.1 sec      Type & Screen: A+ Ab neg x2 samples  CXR: no infiltrate  EKG: afib @58bpm, no STEMI        A/P: 84yFemale pre-op for above procedure  1. NPO past midnight, except medications  2. No IVF, HD patient  3. [x] Blood on hold, Units: 1pRBC

## 2020-02-14 NOTE — CONSULT NOTE ADULT - ATTENDING COMMENTS
f/u post fistulogram to see if we can use AVF or needs temporary cathter, no urgent indication for dialysis today

## 2020-02-14 NOTE — PRE-OP CHECKLIST - LAST TOOK
flakito treatment  Ear points  Janki points  bl 60, bl 40  No complications  All needles removed
solids

## 2020-02-14 NOTE — ED PROVIDER NOTE - CLINICAL SUMMARY MEDICAL DECISION MAKING FREE TEXT BOX
Pt c/o bleeding at HD catheter. Pt c/o bleeding at HD catheter.  No sx of anemia.  Pulses intact.  (+) oozing at puncture site.  Plan labs, vasc consult.  Pt discussed w Dr Cervantes - will reconnect after chem 7 and vasc recommendations.  Pressure dressing applied.

## 2020-02-15 ENCOUNTER — TRANSCRIPTION ENCOUNTER (OUTPATIENT)
Age: 85
End: 2020-02-15

## 2020-02-15 VITALS
DIASTOLIC BLOOD PRESSURE: 64 MMHG | OXYGEN SATURATION: 100 % | WEIGHT: 158.73 LBS | TEMPERATURE: 98 F | SYSTOLIC BLOOD PRESSURE: 155 MMHG | HEART RATE: 74 BPM | RESPIRATION RATE: 17 BRPM

## 2020-02-15 LAB
ANION GAP SERPL CALC-SCNC: 15 MMOL/L — SIGNIFICANT CHANGE UP (ref 5–17)
ANION GAP SERPL CALC-SCNC: 17 MMOL/L — SIGNIFICANT CHANGE UP (ref 5–17)
BUN SERPL-MCNC: 17 MG/DL — SIGNIFICANT CHANGE UP (ref 7–23)
BUN SERPL-MCNC: 77 MG/DL — HIGH (ref 7–23)
CALCIUM SERPL-MCNC: 8.3 MG/DL — LOW (ref 8.4–10.5)
CALCIUM SERPL-MCNC: 8.9 MG/DL — SIGNIFICANT CHANGE UP (ref 8.4–10.5)
CHLORIDE SERPL-SCNC: 87 MMOL/L — LOW (ref 96–108)
CHLORIDE SERPL-SCNC: 93 MMOL/L — LOW (ref 96–108)
CO2 SERPL-SCNC: 17 MMOL/L — LOW (ref 22–31)
CO2 SERPL-SCNC: 25 MMOL/L — SIGNIFICANT CHANGE UP (ref 22–31)
CREAT SERPL-MCNC: 2.33 MG/DL — HIGH (ref 0.5–1.3)
CREAT SERPL-MCNC: 8.51 MG/DL — HIGH (ref 0.5–1.3)
GLUCOSE SERPL-MCNC: 143 MG/DL — HIGH (ref 70–99)
GLUCOSE SERPL-MCNC: 96 MG/DL — SIGNIFICANT CHANGE UP (ref 70–99)
POTASSIUM SERPL-MCNC: 3.4 MMOL/L — LOW (ref 3.5–5.3)
POTASSIUM SERPL-MCNC: 5.1 MMOL/L — SIGNIFICANT CHANGE UP (ref 3.5–5.3)
POTASSIUM SERPL-SCNC: 3.4 MMOL/L — LOW (ref 3.5–5.3)
POTASSIUM SERPL-SCNC: 5.1 MMOL/L — SIGNIFICANT CHANGE UP (ref 3.5–5.3)
SODIUM SERPL-SCNC: 121 MMOL/L — LOW (ref 135–145)
SODIUM SERPL-SCNC: 133 MMOL/L — LOW (ref 135–145)

## 2020-02-15 PROCEDURE — 99238 HOSP IP/OBS DSCHRG MGMT 30/<: CPT

## 2020-02-15 RX ADMIN — SEVELAMER CARBONATE 1600 MILLIGRAM(S): 2400 POWDER, FOR SUSPENSION ORAL at 15:34

## 2020-02-15 RX ADMIN — SEVELAMER CARBONATE 1600 MILLIGRAM(S): 2400 POWDER, FOR SUSPENSION ORAL at 09:24

## 2020-02-15 RX ADMIN — CARVEDILOL PHOSPHATE 12.5 MILLIGRAM(S): 80 CAPSULE, EXTENDED RELEASE ORAL at 06:05

## 2020-02-15 RX ADMIN — APIXABAN 2.5 MILLIGRAM(S): 2.5 TABLET, FILM COATED ORAL at 09:25

## 2020-02-15 RX ADMIN — LOSARTAN POTASSIUM 50 MILLIGRAM(S): 100 TABLET, FILM COATED ORAL at 06:05

## 2020-02-15 RX ADMIN — Medication 1 MILLIGRAM(S): at 15:33

## 2020-02-15 NOTE — DISCHARGE NOTE PROVIDER - CARE PROVIDER_API CALL
Conrad Goss)  Surgery; Vascular Surgery  [address], [address] [place] [address]  Phone: [phone]  Fax: [phone]  Follow Up Time:     Aquiles Cervantes)  Internal Medicine; Nephrology  [address] [place] [address]  Phone: [phone]  Fax: [phone]  Follow Up Time:

## 2020-02-15 NOTE — DISCHARGE NOTE PROVIDER - HOSPITAL COURSE
83yo F with PMH HTN, HLD, afib (on Apixaban), ESRD on HD(MWF) presents with continuous oozing from LUE AVF x 2 days. Patient was last dialyzed on 2/12/20 at her dialysis center without any issues, however afterwards she continued to ooze from the needle hole in her left AV fistula. She was then referred to [facility] ED by her Nephrologist Dr. Cervantes. Patient currently on Eliquis 2.5mg BID for afib and last took her medication on day of admission. She underwent a LUE fistulogram with angioplasty (2/14). On POD1 she under went HD ___________. She will be discharged with plans for outpatient follow-up. 83yo F with PMH HTN, HLD, afib (on Apixaban), ESRD on HD(MWF) presents with continuous oozing from LUE AVF x 2 days. Patient was last dialyzed on 2/12/20 at her dialysis center without any issues, however afterwards she continued to ooze from the needle hole in her left AV fistula. She was then referred to [facility] ED by her Nephrologist Dr. Cervantes. Patient currently on Eliquis 2.5mg BID for afib and last took her medication on day of admission. She underwent a LUE fistulogram with angioplasty (2/14). On POD1 she under went HD without issue, and per the dialysis center, there were no issues with bleeding.. She will be discharged with plans for outpatient follow-up.

## 2020-02-15 NOTE — PROGRESS NOTE ADULT - SUBJECTIVE AND OBJECTIVE BOX
Patient is a 84y Female seen and evaluated at bedside.   s/p LUE Fistulogram/ plasty last pm.  Tolerating HD today.      Meds:    acetaminophen   Tablet .. 650 every 6 hours PRN  apixaban 2.5 two times a day  carvedilol 12.5 every 12 hours  folic acid 1 daily  losartan 50 daily  ondansetron Injectable 4 every 6 hours PRN  sevelamer carbonate 1600 three times a day with meals  simvastatin 20 at bedtime      Allergies    No Known Allergies    Intolerances    Norvasc (Other)      T(C): , Max: 37.1 (02-14-20 @ 14:07)  T(F): , Max: 98.8 (02-14-20 @ 14:07)  HR: 56 (02-15-20 @ 10:40)  BP: 168/110 (02-15-20 @ 10:40)  BP(mean): 85 (02-14-20 @ 18:47)  RR: 18 (02-15-20 @ 10:40)  SpO2: 100% (02-15-20 @ 10:40)  Wt(kg): --    02-15 @ 07:01  -  02-15 @ 12:08  --------------------------------------------------------  IN: 180 mL / OUT: 0 mL / NET: 180 mL      Height (cm): 157.5 (02-14 @ 14:22)  Weight (kg): 70.3 (02-14 @ 14:22)  BMI (kg/m2): 28.3 (02-14 @ 14:22)  BSA (m2): 1.72 (02-14 @ 14:22)    Review of Systems:  CONSTITUTIONAL: No fever or chills, No fatigue or tiredness.  EYES: No blurred or double vision.  RESPIRATORY: No shortness of breath, cough, hemoptysis  CARDIOVASCULAR: No Chest pain or shortness of breath  GASTROINTESTINAL: NO abdominal or flank pain, No nausea or vomiting, No diarrhea  GENITOURINARY: No dysuria or urinary burning, No difficulty passing urine, No hematuria  NEUROLOGICAL: No headaches or blurred vision  SKIN: No skin rashes   MUSCULOSKELETAL: No arthralgia, Joint pain, leg edema, No muscle pains      PHYSICAL EXAM:  GENERAL: NAD, well-developed, well nourished, alert, awake, no acute distress at present  NECK: Neck supple, No JVD  CHEST/LUNG: Clear to auscultation bilaterally; No wheeze, no rales, no crepitations  HEART: Regular rate and rhythm. GABRIELLA II/VI at LPSB, No gallop, no rub   ABDOMEN: Soft, Nontender, BS+nt, No flank tenderness.   EXTREMITIES: No clubbing, cyanosis, or edema  Neurology: AAOx3, no focal neurological deficit            ACCESS: [facility]    LABS:                        11.2   5.66  )-----------( 174      ( 14 Feb 2020 09:02 )             35.5     02-15    121<L>  |  87<L>  |  77<H>  ----------------------------<  143<H>  5.1   |  17<L>  |  8.51<H>    Ca    8.3<L>      15 Feb 2020 06:54        PT/INR - ( 14 Feb 2020 09:02 )   PT: 15.8 sec;   INR: 1.37          PTT - ( 14 Feb 2020 09:02 )  PTT:37.1 sec          RADIOLOGY & ADDITIONAL STUDIES:

## 2020-02-15 NOTE — PROGRESS NOTE ADULT - REASON FOR ADMISSION
Post dialysis bleeding from AVF

## 2020-02-15 NOTE — DISCHARGE NOTE PROVIDER - NSDCMRMEDTOKEN_GEN_ALL_CORE_FT
apixaban 2.5 mg oral tablet: 1 tab(s) orally every 12 hours   carvedilol 12.5 mg oral tablet: 1 tab(s) orally 2 times a day  folic acid 1 mg oral tablet: 1 tab(s) orally once a day  losartan 50 mg oral tablet: 1 tab(s) orally once a day  Renvela 800 mg oral tablet: 2 tab(s) orally 3 times a day (with meals)  simvastatin 20 mg oral tablet: 1 tab(s) orally once a day (at bedtime)

## 2020-02-15 NOTE — DISCHARGE NOTE NURSING/CASE MANAGEMENT/SOCIAL WORK - PATIENT PORTAL LINK FT
You can access the FollowMyHealth Patient Portal offered by [facility] by registering at the following website: [email or web address]. By joining [employer]’s FollowMyHealth portal, you will also be able to view your health information using other applications (apps) compatible with our system.

## 2020-02-15 NOTE — PROGRESS NOTE ADULT - SUBJECTIVE AND OBJECTIVE BOX
O/N: CLARISSE,  VSS poc wnl                                A/P: 84yFemale s/p LUE Fistulogram/ plasty    Diet: Renal Regular  Pain/nausea control  DVT ppx: Eliquis  Dispo plan: home p HD 24 hr events  O/N: CLARISSE,  VSS poc wnl    Subjective:  No acute complaints. Denies pain in L hand. Denies CP/SOB, N/V.    Vital Signs Last 24 Hrs  T(C): 36.3 (15 Feb 2020 06:06), Max: 37.1 (14 Feb 2020 14:07)  T(F): 97.4 (15 Feb 2020 06:06), Max: 98.8 (14 Feb 2020 14:07)  HR: 54 (15 Feb 2020 08:45) (54 - 78)  BP: 133/62 (15 Feb 2020 08:45) (101/49 - 186/78)  BP(mean): 85 (14 Feb 2020 18:47) (78 - 112)  RR: 18 (15 Feb 2020 08:45) (6 - 45)  SpO2: 99% (15 Feb 2020 08:45) (98% - 100%)    Physical Exam:  General: NAD, resting comfortably  Pulmonary: normal resp effort  Extremities: WWP; LUE with palpable thrill over fistula; motor/sensation grossly intact  Neuro: A/O x 3, no focal deficits, sensation normal    LABS:                      11.2   5.66  )-----------( 174      ( 14 Feb 2020 09:02 )             35.5     02-15  121<L>  |  87<L>  |  77<H>  ----------------------------<  143<H>  5.1   |  17<L>  |  8.51<H>    Ca    8.3<L>      15 Feb 2020 06:54    PT/INR - ( 14 Feb 2020 09:02 )   PT: 15.8 sec;   INR: 1.37     PTT - ( 14 Feb 2020 09:02 )  PTT:37.1 sec

## 2020-02-15 NOTE — DISCHARGE NOTE PROVIDER - NSDCCPCAREPLAN_GEN_ALL_CORE_FT
PRINCIPAL DISCHARGE DIAGNOSIS  Diagnosis: Complication of device, initial encounter  Assessment and Plan of Treatment:

## 2020-02-15 NOTE — PROGRESS NOTE ADULT - ASSESSMENT
83 yo F with ESRD on HD MWF admitted with LUE AVF bleeding.  Nephrology consulted for ESRD on HD for inpatient dialysis arrangement.      # ESRD on HD MWF via LUE AVF  - last HD on 2/12  - volume status and electrolytes noted, acceptable  - S/P LUE AV fistulogram and plasty  - getting HD TODAY  - obtain flow sheets from outpatient unit ([address])  - daily weights  - renal diet  - strict I/O    # HTN  - resume home meds  - UF with HD    # Renal bone disease  - C/W sevelamer 1600 mg po tid w each meal    # Anemia  - Hb 11.2 g/dl  - monitor H/H
A/P: 84yFemale s/p LUE Fistulogram/ plasty    Diet: Renal Regular  Pain/nausea control  DVT ppx: Eliquis  Dispo plan: home after HD

## 2020-02-20 DIAGNOSIS — J44.9 CHRONIC OBSTRUCTIVE PULMONARY DISEASE, UNSPECIFIED: ICD-10-CM

## 2020-02-20 DIAGNOSIS — Z71.89 OTHER SPECIFIED COUNSELING: ICD-10-CM

## 2020-02-21 PROCEDURE — 86850 RBC ANTIBODY SCREEN: CPT

## 2020-02-21 PROCEDURE — 85610 PROTHROMBIN TIME: CPT

## 2020-02-21 PROCEDURE — 36415 COLL VENOUS BLD VENIPUNCTURE: CPT

## 2020-02-21 PROCEDURE — 93005 ELECTROCARDIOGRAM TRACING: CPT

## 2020-02-21 PROCEDURE — 85025 COMPLETE CBC W/AUTO DIFF WBC: CPT

## 2020-02-21 PROCEDURE — 86901 BLOOD TYPING SEROLOGIC RH(D): CPT

## 2020-02-21 PROCEDURE — 80048 BASIC METABOLIC PNL TOTAL CA: CPT

## 2020-02-21 PROCEDURE — 76000 FLUOROSCOPY <1 HR PHYS/QHP: CPT

## 2020-02-21 PROCEDURE — C1725: CPT

## 2020-02-21 PROCEDURE — 90935 HEMODIALYSIS ONE EVALUATION: CPT

## 2020-02-21 PROCEDURE — 85730 THROMBOPLASTIN TIME PARTIAL: CPT

## 2020-02-21 PROCEDURE — 99285 EMERGENCY DEPT VISIT HI MDM: CPT

## 2020-02-21 PROCEDURE — 71045 X-RAY EXAM CHEST 1 VIEW: CPT

## 2020-02-24 RX ORDER — SEVELAMER CARBONATE 800 MG/1
800 TABLET, FILM COATED ORAL
Qty: 270 | Refills: 0 | Status: ACTIVE | COMMUNITY
Start: 1900-01-01 | End: 1900-01-01

## 2020-02-25 DIAGNOSIS — M89.8X9 OTHER SPECIFIED DISORDERS OF BONE, UNSPECIFIED SITE: ICD-10-CM

## 2020-02-25 DIAGNOSIS — I48.91 UNSPECIFIED ATRIAL FIBRILLATION: ICD-10-CM

## 2020-02-25 DIAGNOSIS — T82.838A HEMORRHAGE DUE TO VASCULAR PROSTHETIC DEVICES, IMPLANTS AND GRAFTS, INITIAL ENCOUNTER: ICD-10-CM
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
## 2020-02-25 DIAGNOSIS — E78.5 HYPERLIPIDEMIA, UNSPECIFIED: ICD-10-CM

## 2020-02-25 DIAGNOSIS — T82.856A STENOSIS OF PERIPHERAL VASCULAR STENT, INITIAL ENCOUNTER: ICD-10-CM
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
## 2020-02-25 DIAGNOSIS — N18.6 END STAGE RENAL DISEASE: ICD-10-CM

## 2020-02-25 DIAGNOSIS — M10.9 GOUT, UNSPECIFIED: ICD-10-CM

## 2020-02-25 DIAGNOSIS — I12.0 HYPERTENSIVE CHRONIC KIDNEY DISEASE WITH STAGE 5 CHRONIC KIDNEY DISEASE OR END STAGE RENAL DISEASE: ICD-10-CM

## 2020-02-25 DIAGNOSIS — D63.1 ANEMIA IN CHRONIC KIDNEY DISEASE: ICD-10-CM

## 2020-08-16 NOTE — ED ADULT TRIAGE NOTE - BP NONINVASIVE DIASTOLIC (MM HG)
· Patient was significantly hypertensive on admission  Blood pressure is currently stable at 120/80  · Patient was on lisinopril which was initially held for acute kidney injury on presentation  Consider resuming lisinopril once kidney function is back to baseline    · Monitor BP  · Hydralazine prn for SBP > 180 mmHg 68

## 2020-10-28 NOTE — ED ADULT NURSE NOTE - PAIN: PRESENCE, MLM
Patient Education        Seizure: Care Instructions  Your Care Instructions     Seizures are caused by abnormal patterns of electrical signals in the brain. They are different for each person. Seizures can affect movement, speech, vision, or awareness. Some people have only slight shaking of a hand and do not pass out. Other people may pass out and have violent shaking of the whole body. Some people appear to stare into space. They are awake, but they can't respond normally. Later, they may not remember what happened. You may need tests to identify the type and cause of the seizures. A seizure may occur only once, or you may have them more than one time. Taking medicines as directed and following up with your doctor may help keep you from having more seizures. The doctor has checked you carefully, but problems can develop later. If you notice any problems or new symptoms, get medical treatment right away. Follow-up care is a key part of your treatment and safety. Be sure to make and go to all appointments, and call your doctor if you are having problems. It's also a good idea to know your test results and keep a list of the medicines you take. How can you care for yourself at home? · Be safe with medicines. Take your medicines exactly as prescribed. Call your doctor if you think you are having a problem with your medicine. · Do not do any activity that could be dangerous to you or others until your doctor says it is safe to do so. For example, do not drive a car, operate machinery, swim, or climb ladders. · Be sure that anyone treating you for any health problem knows that you have had a seizure and what medicines you are taking for it. · Identify and avoid things that may make you more likely to have a seizure. These may include lack of sleep, alcohol or drug use, stress, or not eating. · Make sure you go to your follow-up appointment. When should you call for help?    Call 911 anytime you think you may need emergency care. For example, call if:    · You have another seizure.     · You have new symptoms, such as trouble walking, speaking, or thinking clearly. Call your doctor now or seek immediate medical care if:    · You are not acting normally. Watch closely for changes in your health, and be sure to contact your doctor if you have any problems. Where can you learn more? Go to http://www.gray.com/  Enter M769 in the search box to learn more about \"Seizure: Care Instructions. \"  Current as of: November 20, 2019               Content Version: 12.6  © 7099-6990 [employer]. Care instructions adapted under license by [employer] (which disclaims liability or warranty for this information). If you have questions about a medical condition or this instruction, always ask your healthcare professional. [address] any warranty or liability for your use of this information. denies pain/discomfort

## 2021-01-26 PROBLEM — I48.91 UNSPECIFIED ATRIAL FIBRILLATION: Chronic | Status: ACTIVE | Noted: 2020-02-14

## 2021-02-02 ENCOUNTER — APPOINTMENT (OUTPATIENT)
Dept: HEART AND VASCULAR | Facility: CLINIC | Age: 86
End: 2021-02-02

## 2021-02-09 ENCOUNTER — APPOINTMENT (OUTPATIENT)
Dept: HEART AND VASCULAR | Facility: CLINIC | Age: 86
End: 2021-02-09
Payer: MEDICARE

## 2021-02-09 VITALS
HEART RATE: 81 BPM | SYSTOLIC BLOOD PRESSURE: 180 MMHG | DIASTOLIC BLOOD PRESSURE: 70 MMHG | BODY MASS INDEX: 36.37 KG/M2 | OXYGEN SATURATION: 98 % | WEIGHT: 174 LBS

## 2021-02-09 DIAGNOSIS — N18.6 END STAGE RENAL DISEASE: ICD-10-CM

## 2021-02-09 DIAGNOSIS — Z99.2 END STAGE RENAL DISEASE: ICD-10-CM

## 2021-02-09 PROCEDURE — 93000 ELECTROCARDIOGRAM COMPLETE: CPT

## 2021-02-09 PROCEDURE — 99214 OFFICE O/P EST MOD 30 MIN: CPT

## 2021-02-09 RX ORDER — LOSARTAN POTASSIUM 50 MG/1
50 TABLET, FILM COATED ORAL DAILY
Qty: 90 | Refills: 0 | Status: DISCONTINUED | COMMUNITY
Start: 2019-01-10 | End: 2021-02-09

## 2021-02-09 RX ORDER — SIMVASTATIN 20 MG/1
20 TABLET, FILM COATED ORAL
Qty: 30 | Refills: 0 | Status: DISCONTINUED | COMMUNITY
End: 2021-02-09

## 2021-02-09 RX ORDER — CARVEDILOL 6.25 MG/1
6.25 TABLET, FILM COATED ORAL TWICE DAILY
Qty: 180 | Refills: 3 | Status: DISCONTINUED | COMMUNITY
End: 2021-02-09

## 2021-02-09 RX ORDER — CARVEDILOL 12.5 MG/1
12.5 TABLET, FILM COATED ORAL TWICE DAILY
Qty: 60 | Refills: 0 | Status: DISCONTINUED | COMMUNITY
Start: 2019-01-24 | End: 2021-02-09

## 2021-02-09 NOTE — HISTORY OF PRESENT ILLNESS
[FreeTextEntry1] : 85F ESRD/HD, HLD, HTN, COPD, and paroxysmal afib (on eliquis) \par \par 2/9/21:  feeling good, compliant with eliquis 2.5mg BID.  skips AM dose on dialysis days due to bleeding from left arm avf. no cp or sob.  no palps.  notes bp is labile.  taken off all bp meds (coreg, losartan). uses walker ET ~ 1 block stops due to fatigue and knee pain.  nephrologist Dr. Cervantes.  got first dose of covid vaccine.   \par \par EKG: afib

## 2021-02-09 NOTE — ASSESSMENT
[FreeTextEntry1] : 85 F \par \par Atrial Fibrillation - controlled not on any avn agents\par EKG: afib, rate in 60s\par HTN - 140/70 in office today \par ESRD\par \par - 24 hr BP monitor.  Currently off losartan and coreg due to labile BP with dialysis.  \par - continue lipitor 80mg\par - echo \par - labs

## 2021-02-09 NOTE — PHYSICAL EXAM
[General Appearance - Well Developed] : well developed [Normal Appearance] : normal appearance [Well Groomed] : well groomed [General Appearance - Well Nourished] : well nourished [No Deformities] : no deformities [General Appearance - In No Acute Distress] : no acute distress [Normal Conjunctiva] : the conjunctiva exhibited no abnormalities [Eyelids - No Xanthelasma] : the eyelids demonstrated no xanthelasmas [Normal Oral Mucosa] : normal oral mucosa [No Oral Pallor] : no oral pallor [No Oral Cyanosis] : no oral cyanosis [Normal Jugular Venous A Waves Present] : normal jugular venous A waves present [Normal Jugular Venous V Waves Present] : normal jugular venous V waves present [No Jugular Venous Hazel A Waves] : no jugular venous hazel A waves [Heart Rate And Rhythm] : heart rate and rhythm were normal [Heart Sounds] : normal S1 and S2 [Murmurs] : no murmurs present [Arterial Pulses Normal] : the arterial pulses were normal [Veins - Varicosity Changes] : no varicosital changes were noted in the lower extremities [Respiration, Rhythm And Depth] : normal respiratory rhythm and effort [Exaggerated Use Of Accessory Muscles For Inspiration] : no accessory muscle use [Auscultation Breath Sounds / Voice Sounds] : lungs were clear to auscultation bilaterally [Abdomen Soft] : soft [Abdomen Tenderness] : non-tender [Abdomen Mass (___ Cm)] : no abdominal mass palpated [Abnormal Walk] : normal gait [Gait - Sufficient For Exercise Testing] : the gait was sufficient for exercise testing [Nail Clubbing] : no clubbing of the fingernails [Cyanosis, Localized] : no localized cyanosis [Petechial Hemorrhages (___cm)] : no petechial hemorrhages [] : no ischemic changes [FreeTextEntry1] : trace b/l le edema

## 2021-02-10 LAB
25(OH)D3 SERPL-MCNC: 50.6 NG/ML
ALBUMIN SERPL ELPH-MCNC: 4.2 G/DL
ALP BLD-CCNC: 239 U/L
ALT SERPL-CCNC: 7 U/L
ANION GAP SERPL CALC-SCNC: 13 MMOL/L
AST SERPL-CCNC: 20 U/L
BASOPHILS # BLD AUTO: 0.09 K/UL
BASOPHILS NFR BLD AUTO: 1.5 %
BILIRUB SERPL-MCNC: 0.8 MG/DL
BUN SERPL-MCNC: 28 MG/DL
CALCIUM SERPL-MCNC: 9.8 MG/DL
CHLORIDE SERPL-SCNC: 94 MMOL/L
CHOLEST SERPL-MCNC: 96 MG/DL
CO2 SERPL-SCNC: 26 MMOL/L
CREAT SERPL-MCNC: 4.05 MG/DL
EOSINOPHIL # BLD AUTO: 0.24 K/UL
EOSINOPHIL NFR BLD AUTO: 4 %
ESTIMATED AVERAGE GLUCOSE: 126 MG/DL
GLUCOSE SERPL-MCNC: 101 MG/DL
HBA1C MFR BLD HPLC: 6 %
HCT VFR BLD CALC: 33.2 %
HDLC SERPL-MCNC: 64 MG/DL
HGB BLD-MCNC: 10.5 G/DL
IMM GRANULOCYTES NFR BLD AUTO: 0.3 %
LDLC SERPL CALC-MCNC: 19 MG/DL
LYMPHOCYTES # BLD AUTO: 1.96 K/UL
LYMPHOCYTES NFR BLD AUTO: 32.5 %
MAGNESIUM SERPL-MCNC: 2.3 MG/DL
MAN DIFF?: NORMAL
MCHC RBC-ENTMCNC: 31.6 GM/DL
MCHC RBC-ENTMCNC: 32.4 PG
MCV RBC AUTO: 102.5 FL
MONOCYTES # BLD AUTO: 0.75 K/UL
MONOCYTES NFR BLD AUTO: 12.4 %
NEUTROPHILS # BLD AUTO: 2.98 K/UL
NEUTROPHILS NFR BLD AUTO: 49.3 %
NONHDLC SERPL-MCNC: 32 MG/DL
NT-PROBNP SERPL-MCNC: ABNORMAL PG/ML
PLATELET # BLD AUTO: 181 K/UL
POTASSIUM SERPL-SCNC: 4.1 MMOL/L
PROT SERPL-MCNC: 6.7 G/DL
RBC # BLD: 3.24 M/UL
RBC # FLD: 13.6 %
SODIUM SERPL-SCNC: 134 MMOL/L
TRIGL SERPL-MCNC: 62 MG/DL
TSH SERPL-ACNC: 2.06 UIU/ML
WBC # FLD AUTO: 6.04 K/UL

## 2021-02-16 ENCOUNTER — APPOINTMENT (OUTPATIENT)
Dept: HEART AND VASCULAR | Facility: CLINIC | Age: 86
End: 2021-02-16
Payer: MEDICARE

## 2021-02-16 PROCEDURE — 93306 TTE W/DOPPLER COMPLETE: CPT

## 2021-06-14 NOTE — ED ADULT NURSE NOTE - SUICIDE SCREENING QUESTION 1
No Suturegard Body: The suture ends were repeatedly re-tightened and re-clamped to achieve the desired tissue expansion.

## 2021-08-24 ENCOUNTER — APPOINTMENT (OUTPATIENT)
Dept: HEART AND VASCULAR | Facility: CLINIC | Age: 86
End: 2021-08-24
Payer: MEDICARE

## 2021-08-24 VITALS
WEIGHT: 150 LBS | SYSTOLIC BLOOD PRESSURE: 200 MMHG | OXYGEN SATURATION: 97 % | BODY MASS INDEX: 32.36 KG/M2 | HEART RATE: 67 BPM | HEIGHT: 57 IN | TEMPERATURE: 98.3 F | DIASTOLIC BLOOD PRESSURE: 80 MMHG

## 2021-08-24 DIAGNOSIS — I48.0 PAROXYSMAL ATRIAL FIBRILLATION: ICD-10-CM

## 2021-08-24 DIAGNOSIS — E78.5 HYPERLIPIDEMIA, UNSPECIFIED: ICD-10-CM

## 2021-08-24 DIAGNOSIS — I10 ESSENTIAL (PRIMARY) HYPERTENSION: ICD-10-CM

## 2021-08-24 PROCEDURE — 93000 ELECTROCARDIOGRAM COMPLETE: CPT

## 2021-08-24 PROCEDURE — 99213 OFFICE O/P EST LOW 20 MIN: CPT

## 2021-08-24 NOTE — HISTORY OF PRESENT ILLNESS
[FreeTextEntry1] : 86F ESRD/HD, HLD, HTN, COPD, and paroxysmal afib (on eliquis) \par \par 2/9/21:  feeling good, compliant with eliquis 2.5mg BID.  skips AM dose on dialysis days due to bleeding from left arm avf. no cp or sob.  no palps.  notes bp is labile.  taken off all bp meds (coreg, losartan). uses walker, ET ~ 1 block stops due to fatigue and knee pain.  nephrologist Dr. Cervantes.  got first dose of covid vaccine.   \par \par 8/24/21:  doing well, no cp or sob.  when it is very humid/hot she feels dizzy.  \par \par EKG: afib

## 2021-08-24 NOTE — ASSESSMENT
[FreeTextEntry1] : 86 F \par \par Atrial Fibrillation - controlled not on any avn agents\par EKG: afib, rate in 60s\par HTN - 140/70 in office today \par ESRD\par ECHO Feb 2021: Normal right and left ventricular systolic function. Moderate to severe concentric hypertrophy impaired relaxation with elevated left atrial pressure. Mild aortic insufficiency. \par \par - back on coreg 6.25mg BID, BP in normal range today.\par - lipitor 20mg, had not been taking statin \par - eliquis 2.5mg BID\par - advised to stay inside as best she can when very hot/humid outside \par

## 2022-03-07 NOTE — BRIEF OPERATIVE NOTE - ASSISTANT(S)
Dr. Hopson Hatchet Flap Text: The defect edges were debeveled with a #15 scalpel blade.  Given the location of the defect, shape of the defect and the proximity to free margins a hatchet flap was deemed most appropriate.  Using a sterile surgical marker, an appropriate hatchet flap was drawn incorporating the defect and placing the expected incisions within the relaxed skin tension lines where possible.    The area thus outlined was incised deep to adipose tissue with a #15 scalpel blade.  The skin margins were undermined to an appropriate distance in all directions utilizing iris scissors.

## 2022-04-26 ENCOUNTER — RX RENEWAL (OUTPATIENT)
Age: 87
End: 2022-04-26

## 2022-07-14 ENCOUNTER — RX RENEWAL (OUTPATIENT)
Age: 87
End: 2022-07-14

## 2022-07-14 RX ORDER — ATORVASTATIN CALCIUM 20 MG/1
20 TABLET, FILM COATED ORAL
Qty: 90 | Refills: 2 | Status: ACTIVE | COMMUNITY
Start: 2021-02-09 | End: 1900-01-01

## 2022-07-14 RX ORDER — APIXABAN 2.5 MG/1
2.5 TABLET, FILM COATED ORAL
Qty: 60 | Refills: 1 | Status: ACTIVE | COMMUNITY
Start: 2019-01-09 | End: 1900-01-01

## 2023-08-17 NOTE — ED PROVIDER NOTE - CONTACT TIME
[FreeTextEntry1] : Annual exam 
negative
08-Jan-2019 13:15
Soft, non-tender, no hepatosplenomegaly, normal bowel sounds
